# Patient Record
Sex: FEMALE | Race: WHITE | NOT HISPANIC OR LATINO
[De-identification: names, ages, dates, MRNs, and addresses within clinical notes are randomized per-mention and may not be internally consistent; named-entity substitution may affect disease eponyms.]

---

## 2017-01-26 ENCOUNTER — APPOINTMENT (OUTPATIENT)
Dept: RADIOLOGY | Facility: CLINIC | Age: 72
End: 2017-01-26

## 2017-01-26 ENCOUNTER — OUTPATIENT (OUTPATIENT)
Dept: OUTPATIENT SERVICES | Facility: HOSPITAL | Age: 72
LOS: 1 days | End: 2017-01-26
Payer: MEDICARE

## 2017-01-26 DIAGNOSIS — Z00.8 ENCOUNTER FOR OTHER GENERAL EXAMINATION: ICD-10-CM

## 2017-01-26 PROCEDURE — 77085 DXA BONE DENSITY AXL VRT FX: CPT

## 2017-04-18 ENCOUNTER — APPOINTMENT (OUTPATIENT)
Dept: ELECTROPHYSIOLOGY | Facility: CLINIC | Age: 72
End: 2017-04-18

## 2017-04-20 ENCOUNTER — APPOINTMENT (OUTPATIENT)
Dept: BREAST CENTER | Facility: CLINIC | Age: 72
End: 2017-04-20

## 2017-04-20 VITALS
HEIGHT: 64 IN | HEART RATE: 67 BPM | BODY MASS INDEX: 24.24 KG/M2 | WEIGHT: 142 LBS | DIASTOLIC BLOOD PRESSURE: 78 MMHG | SYSTOLIC BLOOD PRESSURE: 157 MMHG

## 2017-04-20 RX ORDER — APIXABAN 2.5 MG/1
2.5 TABLET, FILM COATED ORAL
Refills: 0 | Status: DISCONTINUED | COMMUNITY
End: 2017-04-20

## 2017-06-15 ENCOUNTER — APPOINTMENT (OUTPATIENT)
Dept: MAMMOGRAPHY | Facility: CLINIC | Age: 72
End: 2017-06-15

## 2017-06-15 ENCOUNTER — OUTPATIENT (OUTPATIENT)
Dept: OUTPATIENT SERVICES | Facility: HOSPITAL | Age: 72
LOS: 1 days | End: 2017-06-15
Payer: MEDICARE

## 2017-06-15 ENCOUNTER — APPOINTMENT (OUTPATIENT)
Dept: ULTRASOUND IMAGING | Facility: CLINIC | Age: 72
End: 2017-06-15

## 2017-06-15 DIAGNOSIS — Z00.8 ENCOUNTER FOR OTHER GENERAL EXAMINATION: ICD-10-CM

## 2017-06-15 PROCEDURE — 77063 BREAST TOMOSYNTHESIS BI: CPT

## 2017-06-15 PROCEDURE — 77067 SCR MAMMO BI INCL CAD: CPT

## 2017-06-15 PROCEDURE — 76641 ULTRASOUND BREAST COMPLETE: CPT

## 2017-07-17 ENCOUNTER — APPOINTMENT (OUTPATIENT)
Dept: ELECTROPHYSIOLOGY | Facility: CLINIC | Age: 72
End: 2017-07-17

## 2017-10-19 ENCOUNTER — APPOINTMENT (OUTPATIENT)
Dept: ELECTROPHYSIOLOGY | Facility: CLINIC | Age: 72
End: 2017-10-19
Payer: MEDICARE

## 2017-10-19 VITALS
WEIGHT: 145 LBS | BODY MASS INDEX: 24.75 KG/M2 | SYSTOLIC BLOOD PRESSURE: 137 MMHG | DIASTOLIC BLOOD PRESSURE: 73 MMHG | HEIGHT: 64 IN | HEART RATE: 60 BPM

## 2017-10-19 PROCEDURE — 93280 PM DEVICE PROGR EVAL DUAL: CPT

## 2018-01-22 ENCOUNTER — APPOINTMENT (OUTPATIENT)
Dept: ELECTROPHYSIOLOGY | Facility: CLINIC | Age: 73
End: 2018-01-22
Payer: MEDICARE

## 2018-01-22 PROCEDURE — 93296 REM INTERROG EVL PM/IDS: CPT

## 2018-01-22 PROCEDURE — 93294 REM INTERROG EVL PM/LDLS PM: CPT

## 2018-04-23 ENCOUNTER — APPOINTMENT (OUTPATIENT)
Dept: ELECTROPHYSIOLOGY | Facility: CLINIC | Age: 73
End: 2018-04-23
Payer: MEDICARE

## 2018-04-23 PROCEDURE — 93294 REM INTERROG EVL PM/LDLS PM: CPT

## 2018-04-23 PROCEDURE — 93296 REM INTERROG EVL PM/IDS: CPT

## 2018-04-24 ENCOUNTER — APPOINTMENT (OUTPATIENT)
Dept: BREAST CENTER | Facility: CLINIC | Age: 73
End: 2018-04-24
Payer: MEDICARE

## 2018-04-24 VITALS
SYSTOLIC BLOOD PRESSURE: 122 MMHG | DIASTOLIC BLOOD PRESSURE: 70 MMHG | WEIGHT: 148 LBS | HEART RATE: 64 BPM | BODY MASS INDEX: 25.27 KG/M2 | HEIGHT: 64 IN

## 2018-04-24 PROCEDURE — 99214 OFFICE O/P EST MOD 30 MIN: CPT

## 2018-05-13 ENCOUNTER — INPATIENT (INPATIENT)
Facility: HOSPITAL | Age: 73
LOS: 3 days | Discharge: HOME | End: 2018-05-17
Attending: SURGERY | Admitting: SURGERY
Payer: MEDICARE

## 2018-05-13 VITALS — HEIGHT: 63 IN | WEIGHT: 147.05 LBS

## 2018-05-13 DIAGNOSIS — Q45.3 OTHER CONGENITAL MALFORMATIONS OF PANCREAS AND PANCREATIC DUCT: ICD-10-CM

## 2018-05-13 DIAGNOSIS — I10 ESSENTIAL (PRIMARY) HYPERTENSION: ICD-10-CM

## 2018-05-13 DIAGNOSIS — Z95.0 PRESENCE OF CARDIAC PACEMAKER: Chronic | ICD-10-CM

## 2018-05-13 DIAGNOSIS — E78.5 HYPERLIPIDEMIA, UNSPECIFIED: ICD-10-CM

## 2018-05-13 DIAGNOSIS — I48.0 PAROXYSMAL ATRIAL FIBRILLATION: ICD-10-CM

## 2018-05-13 DIAGNOSIS — K35.80 UNSPECIFIED ACUTE APPENDICITIS: ICD-10-CM

## 2018-05-13 DIAGNOSIS — Z90.12 ACQUIRED ABSENCE OF LEFT BREAST AND NIPPLE: Chronic | ICD-10-CM

## 2018-05-13 DIAGNOSIS — Z98.890 OTHER SPECIFIED POSTPROCEDURAL STATES: Chronic | ICD-10-CM

## 2018-05-13 DIAGNOSIS — J44.9 CHRONIC OBSTRUCTIVE PULMONARY DISEASE, UNSPECIFIED: ICD-10-CM

## 2018-05-13 DIAGNOSIS — N18.2 CHRONIC KIDNEY DISEASE, STAGE 2 (MILD): ICD-10-CM

## 2018-05-13 DIAGNOSIS — K80.20 CALCULUS OF GALLBLADDER WITHOUT CHOLECYSTITIS WITHOUT OBSTRUCTION: ICD-10-CM

## 2018-05-13 DIAGNOSIS — R16.0 HEPATOMEGALY, NOT ELSEWHERE CLASSIFIED: ICD-10-CM

## 2018-05-13 LAB
ALBUMIN SERPL ELPH-MCNC: 4 G/DL — SIGNIFICANT CHANGE UP (ref 3.3–5)
ALP SERPL-CCNC: 60 U/L — SIGNIFICANT CHANGE UP (ref 40–120)
ALT FLD-CCNC: 24 U/L — SIGNIFICANT CHANGE UP (ref 12–78)
ANION GAP SERPL CALC-SCNC: 8 MMOL/L — SIGNIFICANT CHANGE UP (ref 5–17)
APPEARANCE UR: CLEAR — SIGNIFICANT CHANGE UP
APTT BLD: 36.8 SEC — SIGNIFICANT CHANGE UP (ref 27.5–37.4)
AST SERPL-CCNC: 26 U/L — SIGNIFICANT CHANGE UP (ref 15–37)
BACTERIA # UR AUTO: (no result)
BASOPHILS # BLD AUTO: 0.1 K/UL — SIGNIFICANT CHANGE UP (ref 0–0.2)
BASOPHILS NFR BLD AUTO: 0.9 % — SIGNIFICANT CHANGE UP (ref 0–2)
BILIRUB SERPL-MCNC: 0.8 MG/DL — SIGNIFICANT CHANGE UP (ref 0.2–1.2)
BILIRUB UR-MCNC: NEGATIVE — SIGNIFICANT CHANGE UP
BUN SERPL-MCNC: 15 MG/DL — SIGNIFICANT CHANGE UP (ref 7–23)
CALCIUM SERPL-MCNC: 9.3 MG/DL — SIGNIFICANT CHANGE UP (ref 8.5–10.1)
CHLORIDE SERPL-SCNC: 107 MMOL/L — SIGNIFICANT CHANGE UP (ref 96–108)
CO2 SERPL-SCNC: 23 MMOL/L — SIGNIFICANT CHANGE UP (ref 22–31)
COLOR SPEC: YELLOW — SIGNIFICANT CHANGE UP
CREAT SERPL-MCNC: 1.21 MG/DL — SIGNIFICANT CHANGE UP (ref 0.5–1.3)
DIFF PNL FLD: NEGATIVE — SIGNIFICANT CHANGE UP
EOSINOPHIL # BLD AUTO: 0.07 K/UL — SIGNIFICANT CHANGE UP (ref 0–0.5)
EOSINOPHIL NFR BLD AUTO: 0.6 % — SIGNIFICANT CHANGE UP (ref 0–6)
EPI CELLS # UR: SIGNIFICANT CHANGE UP
GLUCOSE SERPL-MCNC: 99 MG/DL — SIGNIFICANT CHANGE UP (ref 70–99)
GLUCOSE UR QL: NEGATIVE MG/DL — SIGNIFICANT CHANGE UP
HCT VFR BLD CALC: 45.9 % — HIGH (ref 34.5–45)
HGB BLD-MCNC: 15.5 G/DL — SIGNIFICANT CHANGE UP (ref 11.5–15.5)
IMM GRANULOCYTES NFR BLD AUTO: 0.3 % — SIGNIFICANT CHANGE UP (ref 0–1.5)
INR BLD: 1.32 RATIO — HIGH (ref 0.88–1.16)
KETONES UR-MCNC: NEGATIVE — SIGNIFICANT CHANGE UP
LEUKOCYTE ESTERASE UR-ACNC: (no result)
LIDOCAIN IGE QN: 104 U/L — SIGNIFICANT CHANGE UP (ref 73–393)
LYMPHOCYTES # BLD AUTO: 1.36 K/UL — SIGNIFICANT CHANGE UP (ref 1–3.3)
LYMPHOCYTES # BLD AUTO: 12.3 % — LOW (ref 13–44)
MANUAL SMEAR VERIFICATION: SIGNIFICANT CHANGE UP
MCHC RBC-ENTMCNC: 31.3 PG — SIGNIFICANT CHANGE UP (ref 27–34)
MCHC RBC-ENTMCNC: 33.8 GM/DL — SIGNIFICANT CHANGE UP (ref 32–36)
MCV RBC AUTO: 92.7 FL — SIGNIFICANT CHANGE UP (ref 80–100)
MONOCYTES # BLD AUTO: 1.52 K/UL — HIGH (ref 0–0.9)
MONOCYTES NFR BLD AUTO: 13.8 % — SIGNIFICANT CHANGE UP (ref 2–14)
NEUTROPHILS # BLD AUTO: 7.94 K/UL — HIGH (ref 1.8–7.4)
NEUTROPHILS NFR BLD AUTO: 72.1 % — SIGNIFICANT CHANGE UP (ref 43–77)
NITRITE UR-MCNC: NEGATIVE — SIGNIFICANT CHANGE UP
NRBC # BLD: 0 /100 WBCS — SIGNIFICANT CHANGE UP (ref 0–0)
PH UR: 7 — SIGNIFICANT CHANGE UP (ref 5–8)
PLAT MORPH BLD: NORMAL — SIGNIFICANT CHANGE UP
PLATELET # BLD AUTO: 281 K/UL — SIGNIFICANT CHANGE UP (ref 150–400)
POTASSIUM SERPL-MCNC: 4.5 MMOL/L — SIGNIFICANT CHANGE UP (ref 3.5–5.3)
POTASSIUM SERPL-SCNC: 4.5 MMOL/L — SIGNIFICANT CHANGE UP (ref 3.5–5.3)
PROT SERPL-MCNC: 7.7 GM/DL — SIGNIFICANT CHANGE UP (ref 6–8.3)
PROT UR-MCNC: NEGATIVE MG/DL — SIGNIFICANT CHANGE UP
PROTHROM AB SERPL-ACNC: 14.3 SEC — HIGH (ref 9.8–12.7)
RBC # BLD: 4.95 M/UL — SIGNIFICANT CHANGE UP (ref 3.8–5.2)
RBC # FLD: 13.3 % — SIGNIFICANT CHANGE UP (ref 10.3–14.5)
RBC BLD AUTO: NORMAL — SIGNIFICANT CHANGE UP
RBC CASTS # UR COMP ASSIST: SIGNIFICANT CHANGE UP /HPF (ref 0–4)
SODIUM SERPL-SCNC: 138 MMOL/L — SIGNIFICANT CHANGE UP (ref 135–145)
SP GR SPEC: 1 — LOW (ref 1.01–1.02)
UROBILINOGEN FLD QL: NEGATIVE MG/DL — SIGNIFICANT CHANGE UP
WBC # BLD: 11.02 K/UL — HIGH (ref 3.8–10.5)
WBC # FLD AUTO: 11.02 K/UL — HIGH (ref 3.8–10.5)
WBC UR QL: SIGNIFICANT CHANGE UP

## 2018-05-13 PROCEDURE — 93010 ELECTROCARDIOGRAM REPORT: CPT

## 2018-05-13 PROCEDURE — 71045 X-RAY EXAM CHEST 1 VIEW: CPT | Mod: 26

## 2018-05-13 PROCEDURE — 74177 CT ABD & PELVIS W/CONTRAST: CPT | Mod: 26

## 2018-05-13 PROCEDURE — 99223 1ST HOSP IP/OBS HIGH 75: CPT | Mod: 57

## 2018-05-13 PROCEDURE — 99285 EMERGENCY DEPT VISIT HI MDM: CPT

## 2018-05-13 RX ORDER — TIOTROPIUM BROMIDE 18 UG/1
1 CAPSULE ORAL; RESPIRATORY (INHALATION) DAILY
Qty: 0 | Refills: 0 | Status: DISCONTINUED | OUTPATIENT
Start: 2018-05-13 | End: 2018-05-14

## 2018-05-13 RX ORDER — SODIUM CHLORIDE 9 MG/ML
1000 INJECTION INTRAMUSCULAR; INTRAVENOUS; SUBCUTANEOUS ONCE
Qty: 0 | Refills: 0 | Status: COMPLETED | OUTPATIENT
Start: 2018-05-13 | End: 2018-05-13

## 2018-05-13 RX ORDER — METOPROLOL TARTRATE 50 MG
0 TABLET ORAL
Qty: 0 | Refills: 0 | COMMUNITY

## 2018-05-13 RX ORDER — PIPERACILLIN AND TAZOBACTAM 4; .5 G/20ML; G/20ML
3.38 INJECTION, POWDER, LYOPHILIZED, FOR SOLUTION INTRAVENOUS ONCE
Qty: 0 | Refills: 0 | Status: COMPLETED | OUTPATIENT
Start: 2018-05-13 | End: 2018-05-13

## 2018-05-13 RX ORDER — HYDROMORPHONE HYDROCHLORIDE 2 MG/ML
0.5 INJECTION INTRAMUSCULAR; INTRAVENOUS; SUBCUTANEOUS EVERY 4 HOURS
Qty: 0 | Refills: 0 | Status: DISCONTINUED | OUTPATIENT
Start: 2018-05-13 | End: 2018-05-14

## 2018-05-13 RX ORDER — SODIUM CHLORIDE 9 MG/ML
1000 INJECTION INTRAMUSCULAR; INTRAVENOUS; SUBCUTANEOUS
Qty: 0 | Refills: 0 | Status: DISCONTINUED | OUTPATIENT
Start: 2018-05-13 | End: 2018-05-14

## 2018-05-13 RX ORDER — PIPERACILLIN AND TAZOBACTAM 4; .5 G/20ML; G/20ML
3.38 INJECTION, POWDER, LYOPHILIZED, FOR SOLUTION INTRAVENOUS EVERY 8 HOURS
Qty: 0 | Refills: 0 | Status: DISCONTINUED | OUTPATIENT
Start: 2018-05-13 | End: 2018-05-14

## 2018-05-13 RX ORDER — HYDROMORPHONE HYDROCHLORIDE 2 MG/ML
1 INJECTION INTRAMUSCULAR; INTRAVENOUS; SUBCUTANEOUS EVERY 4 HOURS
Qty: 0 | Refills: 0 | Status: DISCONTINUED | OUTPATIENT
Start: 2018-05-13 | End: 2018-05-14

## 2018-05-13 RX ORDER — TIOTROPIUM BROMIDE 18 UG/1
2.5 CAPSULE ORAL; RESPIRATORY (INHALATION) DAILY
Qty: 0 | Refills: 0 | Status: DISCONTINUED | OUTPATIENT
Start: 2018-05-13 | End: 2018-05-13

## 2018-05-13 RX ORDER — SIMVASTATIN 20 MG/1
1 TABLET, FILM COATED ORAL
Qty: 0 | Refills: 0 | COMMUNITY

## 2018-05-13 RX ORDER — LISINOPRIL 2.5 MG/1
20 TABLET ORAL DAILY
Qty: 0 | Refills: 0 | Status: DISCONTINUED | OUTPATIENT
Start: 2018-05-13 | End: 2018-05-13

## 2018-05-13 RX ORDER — FAMOTIDINE 10 MG/ML
20 INJECTION INTRAVENOUS ONCE
Qty: 0 | Refills: 0 | Status: COMPLETED | OUTPATIENT
Start: 2018-05-13 | End: 2018-05-13

## 2018-05-13 RX ORDER — DILTIAZEM HCL 120 MG
300 CAPSULE, EXT RELEASE 24 HR ORAL DAILY
Qty: 0 | Refills: 0 | Status: DISCONTINUED | OUTPATIENT
Start: 2018-05-13 | End: 2018-05-14

## 2018-05-13 RX ORDER — TIOTROPIUM BROMIDE 18 UG/1
0 CAPSULE ORAL; RESPIRATORY (INHALATION)
Qty: 0 | Refills: 0 | COMMUNITY

## 2018-05-13 RX ORDER — METOPROLOL TARTRATE 50 MG
25 TABLET ORAL
Qty: 0 | Refills: 0 | Status: DISCONTINUED | OUTPATIENT
Start: 2018-05-13 | End: 2018-05-14

## 2018-05-13 RX ORDER — SIMVASTATIN 20 MG/1
10 TABLET, FILM COATED ORAL AT BEDTIME
Qty: 0 | Refills: 0 | Status: DISCONTINUED | OUTPATIENT
Start: 2018-05-13 | End: 2018-05-14

## 2018-05-13 RX ORDER — ACETAMINOPHEN 500 MG
650 TABLET ORAL EVERY 6 HOURS
Qty: 0 | Refills: 0 | Status: DISCONTINUED | OUTPATIENT
Start: 2018-05-13 | End: 2018-05-14

## 2018-05-13 RX ORDER — SODIUM CHLORIDE 9 MG/ML
3 INJECTION INTRAMUSCULAR; INTRAVENOUS; SUBCUTANEOUS ONCE
Qty: 0 | Refills: 0 | Status: COMPLETED | OUTPATIENT
Start: 2018-05-13 | End: 2018-05-13

## 2018-05-13 RX ORDER — LISINOPRIL 2.5 MG/1
20 TABLET ORAL
Qty: 0 | Refills: 0 | Status: DISCONTINUED | OUTPATIENT
Start: 2018-05-13 | End: 2018-05-14

## 2018-05-13 RX ORDER — ALBUTEROL 90 UG/1
2 AEROSOL, METERED ORAL EVERY 6 HOURS
Qty: 0 | Refills: 0 | Status: DISCONTINUED | OUTPATIENT
Start: 2018-05-13 | End: 2018-05-14

## 2018-05-13 RX ORDER — HEPARIN SODIUM 5000 [USP'U]/ML
5000 INJECTION INTRAVENOUS; SUBCUTANEOUS EVERY 12 HOURS
Qty: 0 | Refills: 0 | Status: DISCONTINUED | OUTPATIENT
Start: 2018-05-13 | End: 2018-05-14

## 2018-05-13 RX ORDER — PANTOPRAZOLE SODIUM 20 MG/1
40 TABLET, DELAYED RELEASE ORAL DAILY
Qty: 0 | Refills: 0 | Status: DISCONTINUED | OUTPATIENT
Start: 2018-05-13 | End: 2018-05-14

## 2018-05-13 RX ORDER — ONDANSETRON 8 MG/1
4 TABLET, FILM COATED ORAL EVERY 6 HOURS
Qty: 0 | Refills: 0 | Status: DISCONTINUED | OUTPATIENT
Start: 2018-05-13 | End: 2018-05-14

## 2018-05-13 RX ORDER — ALBUTEROL 90 UG/1
2 AEROSOL, METERED ORAL ONCE
Qty: 0 | Refills: 0 | Status: DISCONTINUED | OUTPATIENT
Start: 2018-05-13 | End: 2018-05-14

## 2018-05-13 RX ADMIN — SIMVASTATIN 10 MILLIGRAM(S): 20 TABLET, FILM COATED ORAL at 22:55

## 2018-05-13 RX ADMIN — SODIUM CHLORIDE 100 MILLILITER(S): 9 INJECTION INTRAMUSCULAR; INTRAVENOUS; SUBCUTANEOUS at 20:44

## 2018-05-13 RX ADMIN — LISINOPRIL 20 MILLIGRAM(S): 2.5 TABLET ORAL at 22:55

## 2018-05-13 RX ADMIN — SODIUM CHLORIDE 1000 MILLILITER(S): 9 INJECTION INTRAMUSCULAR; INTRAVENOUS; SUBCUTANEOUS at 13:00

## 2018-05-13 RX ADMIN — Medication 650 MILLIGRAM(S): at 23:00

## 2018-05-13 RX ADMIN — PIPERACILLIN AND TAZOBACTAM 200 GRAM(S): 4; .5 INJECTION, POWDER, LYOPHILIZED, FOR SOLUTION INTRAVENOUS at 17:09

## 2018-05-13 RX ADMIN — PIPERACILLIN AND TAZOBACTAM 25 GRAM(S): 4; .5 INJECTION, POWDER, LYOPHILIZED, FOR SOLUTION INTRAVENOUS at 22:56

## 2018-05-13 RX ADMIN — FAMOTIDINE 20 MILLIGRAM(S): 10 INJECTION INTRAVENOUS at 13:10

## 2018-05-13 NOTE — ED PROVIDER NOTE - CONSTITUTIONAL, MLM
normal... white female adult, non toxic, alert, oriented to person, place, time/situation and in no apparent distress.

## 2018-05-13 NOTE — H&P ADULT - PMH
Afib    Chronic obstructive pulmonary disease, unspecified COPD type    Hyperlipidemia, unspecified hyperlipidemia type    Hypertension, unspecified type    Malignant neoplasm of left female breast, unspecified estrogen receptor status, unspecified site of breast    Pacemaker

## 2018-05-13 NOTE — CONSULT NOTE ADULT - PROBLEM SELECTOR RECOMMENDATION 2
Hold Eliquis. History of PAF with less than 6-7% burden on pacemaker interrogation. Anticoagulation service to advise. As per discussion with patient and surgeon will hold bridging with Lovenox as took last dose after 7:30 PM. No previous history of TIA/CVA. CHADS2 1. Patient will be on subcu heparin for DVT prophylaxis. Hold Eliquis. History of PAF with less than 6-7% burden on pacemaker interrogation. Anticoagulation service to advise. As per discussion with patient and surgeon will hold bridging with Lovenox as took last dose after 7:30 PM. No previous history of TIA/CVA. CHADS2 1-low risk. Patient will be on subcu heparin for DVT prophylaxis.

## 2018-05-13 NOTE — ED ADULT TRIAGE NOTE - CHIEF COMPLAINT QUOTE
pt states RUQ pain w/ radiating pain to the right mid axillary region for the past 3 days not associated with n/v/d, hematuria or dysuria. pt states pain is exacerbated by movement. denies fever or chills.

## 2018-05-13 NOTE — ED PROVIDER NOTE - OBJECTIVE STATEMENT
72 y/o F with a PMHx of torturous colon, pacemaker, Afib on Eliquis, BA Cancer in remission, mastectomy, Empyema presents to the ED c/o +belching, RUQ and RLQ quadrant abd pain since Friday evening, +abd distended/ abd pain in the RUQ then gradually diffuse abd, by Saturday localized into RUQ pain described as sharp moderate intermittent, intermittently radiating to waist line. Aggravating factors such as movement, breathing, turning side to side, coughing. Pt notes appetite is normal. Denies N/V. Pt notes soft stools Saturday. Pt states belching does not make the pain go away. Denies trauma or known injury. PMD: Dr.Grace Abrams [facility] . Allergic to Mobic.

## 2018-05-13 NOTE — CONSULT NOTE ADULT - PROBLEM SELECTOR RECOMMENDATION 8
To follow with GI outpatient- Dr. Gómez. A copy of CT scan given to patient. Patient known about fatty infiltration of liver. To follow with GI outpatient- Dr. Gómez. A copy of CT scan given to patient.

## 2018-05-13 NOTE — ED PROVIDER NOTE - PROGRESS NOTE DETAILS
Pt declines pain medication at current time. Dr. Pascal:  OK for CT A/P to be done w/ IV contrast. Dr. Pascal:  Case signed out to Dr. Rodriguez:  [] CT A/P,   [] po trial if neg. & reassess,  [] dispo. Rec s/o pending imaging - CT A/P showed GB distention, rec US to evaluate further  RUQ US showed GB thickening and sludge, no pericholecystic fluid or stones. +sonographic [place] sign. Gen surg sonsult called with ROCK Tian scan ordered, will f/u recs Rec s/o pending CT abd - shows appendicitis. Surg cs obtaioned wioth Dr Baptiste - rec anthony and admit

## 2018-05-13 NOTE — CONSULT NOTE ADULT - PROBLEM SELECTOR RECOMMENDATION 9
NPO. IVF and IV Zosyn. Patient will be taken to OR tomorrow due to being on Eliquis and patient also agreed.  RCRI=0. No gross contraindication medically once beyond 48 hours from last dose of Eliquis. To follow with GI outpatient- Dr. Gómez. A copy of CT scan given to patient. Avoid fatty foods. NPO. IVF and IV Zosyn. Patient will be taken to OR tomorrow due to being on Eliquis and patient also agreed.  RCRI=0. No gross contraindication medically once beyond recommended wait after last dose of Eliquis.

## 2018-05-13 NOTE — CONSULT NOTE ADULT - ASSESSMENT
74 y/o F with a PMHx of torturous colon, s/p pacemaker (interrogated April 2018), PAF on Eliquis, HTN- Echo normal LVEF and negative nuclear stress test 2017 (as per patient), Left Breast Cancer s/p mastectomy and adjuvant chemo in remission,, Hyperlipidemia, Arthritis especially knees and Empyema presents to the ED c/o +belching, RUQ and RLQ quadrant abd pain since Friday evening, +abd distended/ abd pain in the RUQ then gradually diffuse abd, by Saturday localized into RUQ pain described as sharp moderate intermittent, intermittently radiating to waist line. Being admitted for acute appendicitis for possible OR tomorrow due to being Eliquis and last dose less than 24 hours ago.

## 2018-05-13 NOTE — CONSULT NOTE ADULT - ATTENDING COMMENTS
Diet: NPO except medications.     RADIOLOGY & ADDITIONAL TESTS:    Imaging Personally Reviewed:  [ x] YES  [ ] NO    Consultant(s) Notes Reviewed:  [x ] YES  [ ] NO      DVT Prophylaxis:  Subcu Heparin [x  ]     LMWH [ ]     Coumadin [ ]    Xaeralto [ ]    Eliquis [ ]   Venodyne pumps [ ]    Discussed with Patient [ x]     Family [ ]          [ ]   RN[x ]      [ ]    Advance Directives: Full code.     Care Discussed with Consultants/Other Providers [x ] YES  [ ] NO   Dr. Bedoya, RN and pharmacist.    Care plan was discussed with patient in detail and questions answered. In agreement.

## 2018-05-13 NOTE — H&P ADULT - ASSESSMENT
A/P:  Acute appendicitis  Coagulopathy on eliquis anticoagulation   Medical comorbidities of HTN, HLD, copd, atrial fibrillation, h/o breast cancer  NPO, IV hydration  IV antibiotics  GI/DVT prophylaxis  Pain control  Hospitalist consult for medical management  Cardiology consult  Pulmonary consult  Incentive spirometry  Serial abd exams  F/U labs    Pt offered surgical intervention of laparascopic possible open appendectomy. Pt explained the surgical procedures in both medical terminology and in lay terms that the patient understood. Pt explained in both medical terminology and in lay terms that the patient understood, the benefits and alternatives of surgery including non operative management. Pt explained at length in both medical terminology and in lay terms that the patient understood, the associated risks of surgery, including HIGH risk of dayan and post operative bleeding associated with eliquis anticoagulation use. Pt understood all of the above. All questions were answered. Pt DOES NOT want to have surgery at this time, pt wants to await till tomorrow 5/14/18 to have surgery , to minimize the risk of bleeding during surgical intervention. Pt explained in both medical terminology and in lay terms that the patient understood, the associated risks of the decision to not have surgery including worsening pathology, risk of perforation, risk of sepsis, risk of septic shock. Pt understood all of the above, all questions were answered.    Plan for surgical intervention of appendectomy 5/14/18  Pt aware of and agrees with all of the above

## 2018-05-13 NOTE — ED ADULT NURSE NOTE - OBJECTIVE STATEMENT
C/O abdominal pain. Patient reports she was "bloated on Friday" when pain started, then pain moved to RUQ. Denies N/V/D, fever chills, abdominal surgeries. Reports hx of Afib, pacemaker.

## 2018-05-13 NOTE — ED PROVIDER NOTE - ENMT, MLM
oropharynx clear, mucus membranes mildly dry. Airway patent, Nasal mucosa clear. Mouth with normal mucosa. Throat has no vesicles, no oropharyngeal exudates and uvula is midline.

## 2018-05-13 NOTE — H&P ADULT - HISTORY OF PRESENT ILLNESS
Pt seen and examined at bedside with chaperone. Pt is AAOx3, pt in no acute distress. Pt has c/o progressively worsening right lower quadrant pain since 5/11/18. Pt denied c/o fever, chills, chest pain, SOB, N/V/D, extremity pain or dysfunction, hemoptysis, hematemesis, hematuria, hematochexia, headache, diplopia, vertigo, dizzyness. Pt tolerating diet, (+) void, (+) ambulation, (+) bowel function    Pt on eliquis anticoagulation, last dose was evening 5/12/18

## 2018-05-13 NOTE — ED PROVIDER NOTE - MEDICAL DECISION MAKING DETAILS
Elderly female ambulatory with 2 days light abd pain, +tenderness to RU, RLQ on exam, denies N/V/D, +abd distended plan CXR, labs, urine, IV fluids, CT ABD/Pelvis.

## 2018-05-13 NOTE — H&P ADULT - NSHPLABSRESULTS_GEN_ALL_CORE
Vital Signs Last 24 Hrs  T(C): 37.2 (13 May 2018 15:49), Max: 37.2 (13 May 2018 15:49)  T(F): 99 (13 May 2018 15:49), Max: 99 (13 May 2018 15:49)  HR: 62 (13 May 2018 15:49) (62 - 66)  BP: 128/51 (13 May 2018 15:49) (112/66 - 128/51)  BP(mean): --  RR: 16 (13 May 2018 15:49) (16 - 18)  SpO2: 95% (13 May 2018 15:49) (95% - 97%)    Labs:                        15.5   11.02 )-----------( 281      ( 13 May 2018 12:22 )             45.9     CBC Full  -  ( 13 May 2018 12:22 )  WBC Count : 11.02 K/uL  Hemoglobin : 15.5 g/dL  Hematocrit : 45.9 %  Platelet Count - Automated : 281 K/uL  Mean Cell Volume : 92.7 fl  Mean Cell Hemoglobin : 31.3 pg  Mean Cell Hemoglobin Concentration : 33.8 gm/dL  Auto Neutrophil # : 7.94 K/uL  Auto Lymphocyte # : 1.36 K/uL  Auto Monocyte # : 1.52 K/uL  Auto Eosinophil # : 0.07 K/uL  Auto Basophil # : 0.10 K/uL  Auto Neutrophil % : 72.1 %  Auto Lymphocyte % : 12.3 %  Auto Monocyte % : 13.8 %  Auto Eosinophil % : 0.6 %  Auto Basophil % : 0.9 %    05-13    138  |  107  |  15  ----------------------------<  99  4.5   |  23  |  1.21    Ca    9.3      13 May 2018 12:22    TPro  7.7  /  Alb  4.0  /  TBili  0.8  /  DBili  x   /  AST  26  /  ALT  24  /  AlkPhos  60  05-13    LIVER FUNCTIONS - ( 13 May 2018 12:22 )  Alb: 4.0 g/dL / Pro: 7.7 gm/dL / ALK PHOS: 60 U/L / ALT: 24 U/L / AST: 26 U/L / GGT: x           PT/INR - ( 13 May 2018 13:10 )   PT: 14.3 sec;   INR: 1.32 ratio         PTT - ( 13 May 2018 13:10 )  PTT:36.8 sec    Radiology:    EXAM:  CT ABDOMEN AND PELVIS OC IC                          PROCEDURE DATE:  05/13/2018      INTERPRETATION:  Clinical history: 73 year old female, right abdominal   pain, tender, distention rule out appendicitis, cholecystitis.    Impression    Prominence of the appendix with surrounding soft tissue infiltration most   consistent with appendicitis in the correct clinical context. No   appendicolith, collection or evidence of perforation.    Solitary subcentimeter gallbladder calculus with no other acute findings    Slight prominence of the pancreatic head with areas of decreased   attenuation most consistent with fatty infiltration, no gross mass.    Hepatomegaly with diffuse fatty infiltration, no focal mass or   intrahepatic biliary ductal dilatation.    EXAM:  XR CHEST AP OR PA 1V                          PROCEDURE DATE:  05/13/2018      INTERPRETATION:  CLINICAL STATEMENT: Chest Pain.    TECHNIQUE: AP view of the chest.    COMPARISON: None available.    FINDINGS/  IMPRESSION:  Left cardiac device and surgical clips.    Atelectasis lung bases. No consolidation or pleural effusion.    Heart size cannot be accurately assessed in this projection.

## 2018-05-13 NOTE — CONSULT NOTE ADULT - SUBJECTIVE AND OBJECTIVE BOX
PCP:  Dr. Sandra Cummins                                       Consultant: Dr. Gutierrez-cardiologist, Dr. Justin-pulmonary, Dr. Stoddard-EP    CC: Patient is a 73y old  Female who presents with a chief complaint of abd pain since 18 (13 May 2018 18:06)    HPI: 72 y/o F with a PMHx of torturous colon, s/p pacemaker (interrogated 2018), PAF on Eliquis, HTN- Echo normal LVEF and negative nuclear stress test  (as per patient), Left Breast Cancer s/p mastectomy and adjuvant chemo in remission,, Hyperlipidemia, Arthritis especially knees and Empyema presents to the ED c/o +belching, RUQ and RLQ quadrant abd pain since Friday evening, +abd distended/ abd pain in the RUQ then gradually diffuse abd, by Saturday localized into RUQ pain described as sharp moderate intermittent, intermittently radiating to waist line. Aggravating factors such as movement, breathing, turning side to side, coughing. Pt notes appetite is normal. Denies N/V. Pt notes soft stools Saturday. Pt states belching does not make the pain go away. Denies trauma or known injury. Pt has c/o progressively worsening right lower quadrant pain since 18. Pt denied c/o fever, chills, chest pain, SOB, N/V/D, extremity pain or dysfunction, hemoptysis, hematemesis, hematuria, hematochezia headache, diplopia, vertigo, dizziness Pt tolerating diet, (+) void, (+) ambulation, (+) bowel function. Pt on Eliquis anticoagulation, last dose was evening 18 at 7:30 PM.       REVIEW OF SYSTEMS: Rest of the review of systems is negative except what mentioned in HPI. Climb a flight of chair and just finished pulmonary rehab.      Allergies and Intolerances:        Allergies:  	No Known Allergies:        Intolerances:  	Mobic: Drug, Other, tachycardia    Home Medications:   * Incomplete Medication History as of 13-May-2018 10:10 documented in Structured Notes  · 	Toprol-XL 25 mg oral tablet, extended release: orally 2 times a day, Last Dose Taken:    · 	Cardizem 60 mg oral tablet: orally once a day, Last Dose Taken:    · 	Cardizem SR: 300 milligram(s) orally once a day, Last Dose Taken:    · 	Eliquis 5 mg oral tablet: 1 tab(s) orally 2 times a day, Last Dose Taken:    · 	simvastatin 10 mg oral tablet: 1 tab(s) orally once a day (at bedtime), Last Dose Taken:    · 	benazepril 20 mg oral tablet: 1 tab(s) orally once a day, Last Dose Taken:    · 	Spiriva: Last Dose Taken:    · 	fenofibrate: Last Dose Taken:      .    Patient History:    Past Medical History:  Afib    Chronic obstructive pulmonary disease, unspecified COPD type    Hyperlipidemia, unspecified hyperlipidemia type    Hypertension, unspecified type    Malignant neoplasm of left female breast, unspecified estrogen receptor status, unspecified site of breast    Pacemaker.     Past Surgical History:  Artificial pacemaker    H/O mastectomy, left    S/P right knee arthroscopy.     Family History:  Father  of pancreatic cancer.     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 Ex-smoker. Quit 20+ years ago. Smoked for 1 PPD for 10-15 years.  No alcohol or drug abuse.    PHYSICAL EXAM:  T(C): 37.2 (18 @ 15:49), Max: 37.2 (18 @ 15:49)  HR: 62 (-18 @ 15:49) (62 - 66)  BP: 128/51 (-18 @ 15:49) (112/66 - 128/51)  RR: 16 (18 @ 15:49) (16 - 18)  SpO2: 95% (18 @ 15:49) (95% - 97%)  Wt(kg): --  I&O's Summary    GENERAL: NAD, well-groomed, well-developed  HEAD:  Atraumatic, Normocephalic  EYES: EOMI, PERRLA, conjunctiva and sclera clear  ENMT: No tonsillar erythema, exudates, or enlargement; Moist mucous membranes.  No lesions.  NECK: Supple, No JVD, Normal thyroid  NERVOUS SYSTEM:  Alert & Oriented X3, Good concentration; Motor Strength 5/5 B/L upper and lower extremities.  CHEST/LUNG: Clear to percussion bilaterally; No rales, rhonchi, wheezing, or rubs  HEART: Regular rate and rhythm; No murmurs, rubs, or gallops  ABDOMEN: Soft but distended with right lower quadrant tenderness without rigidity or rebound tenderness.  EXTREMITIES:  1+ Peripheral Pulses, No clubbing, cyanosis, or edema  LYMPH: No lymphadenopathy noted  SKIN: No rashes or lesions    LABS:                        15.5   11.02 )-----------( 281      ( 13 May 2018 12:22 )             45.9         138  |  107  |  15  ----------------------------<  99  4.5   |  23  |  1.21    Ca    9.3      13 May 2018 12:22    TPro  7.7  /  Alb  4.0  /  TBili  0.8  /  DBili  x   /  AST  26  /  ALT  24  /  AlkPhos  60  -    PT/INR - ( 13 May 2018 13:10 )   PT: 14.3 sec;   INR: 1.32 ratio         PTT - ( 13 May 2018 13:10 )  PTT:36.8 sec  Urinalysis Basic - ( 13 May 2018 13:05 )    Color: Yellow / Appearance: Clear / S.005 / pH: x  Gluc: x / Ketone: Negative  / Bili: Negative / Urobili: Negative mg/dL   Blood: x / Protein: Negative mg/dL / Nitrite: Negative   Leuk Esterase: Trace / RBC: 0-2 /HPF / WBC 3-5   Sq Epi: x / Non Sq Epi: Occasional / Bacteria: Few      CAPILLARY BLOOD GLUCOSE    EKG Paced @ 61.    Chest x-ray: Left cardiac device and surgical clips.    Atelectasis lung bases. No consolidation or pleural effusion.    Heart size cannot be accurately assessed in this projection.    CT abdomen and pelvis:   Prominence of the appendix with surrounding soft tissue infiltration most   consistent with appendicitis in the correct clinical context. No   appendicolith, collection or evidence of perforation.    Solitary subcentimeter gallbladder calculus with no other acute findings    Slight prominence of the pancreatic head with areas of decreased   attenuation most consistent with fatty infiltration, no gross mass.    Hepatomegaly with diffuse fatty infiltration, no focal mass or   intrahepatic biliary ductal dilatation.

## 2018-05-14 ENCOUNTER — RESULT REVIEW (OUTPATIENT)
Age: 73
End: 2018-05-14

## 2018-05-14 LAB
ANION GAP SERPL CALC-SCNC: 9 MMOL/L — SIGNIFICANT CHANGE UP (ref 5–17)
BUN SERPL-MCNC: 10 MG/DL — SIGNIFICANT CHANGE UP (ref 7–23)
CALCIUM SERPL-MCNC: 8.7 MG/DL — SIGNIFICANT CHANGE UP (ref 8.5–10.1)
CHLORIDE SERPL-SCNC: 112 MMOL/L — HIGH (ref 96–108)
CO2 SERPL-SCNC: 22 MMOL/L — SIGNIFICANT CHANGE UP (ref 22–31)
CREAT SERPL-MCNC: 1 MG/DL — SIGNIFICANT CHANGE UP (ref 0.5–1.3)
GLUCOSE SERPL-MCNC: 109 MG/DL — HIGH (ref 70–99)
HCT VFR BLD CALC: 43.9 % — SIGNIFICANT CHANGE UP (ref 34.5–45)
HGB BLD-MCNC: 14.6 G/DL — SIGNIFICANT CHANGE UP (ref 11.5–15.5)
MCHC RBC-ENTMCNC: 30.9 PG — SIGNIFICANT CHANGE UP (ref 27–34)
MCHC RBC-ENTMCNC: 33.3 GM/DL — SIGNIFICANT CHANGE UP (ref 32–36)
MCV RBC AUTO: 92.8 FL — SIGNIFICANT CHANGE UP (ref 80–100)
NRBC # BLD: 0 /100 WBCS — SIGNIFICANT CHANGE UP (ref 0–0)
PLATELET # BLD AUTO: 290 K/UL — SIGNIFICANT CHANGE UP (ref 150–400)
POTASSIUM SERPL-MCNC: 3.8 MMOL/L — SIGNIFICANT CHANGE UP (ref 3.5–5.3)
POTASSIUM SERPL-SCNC: 3.8 MMOL/L — SIGNIFICANT CHANGE UP (ref 3.5–5.3)
RBC # BLD: 4.73 M/UL — SIGNIFICANT CHANGE UP (ref 3.8–5.2)
RBC # FLD: 13.4 % — SIGNIFICANT CHANGE UP (ref 10.3–14.5)
SODIUM SERPL-SCNC: 143 MMOL/L — SIGNIFICANT CHANGE UP (ref 135–145)
WBC # BLD: 8.74 K/UL — SIGNIFICANT CHANGE UP (ref 3.8–10.5)
WBC # FLD AUTO: 8.74 K/UL — SIGNIFICANT CHANGE UP (ref 3.8–10.5)

## 2018-05-14 PROCEDURE — 88304 TISSUE EXAM BY PATHOLOGIST: CPT | Mod: 26

## 2018-05-14 PROCEDURE — 93280 PM DEVICE PROGR EVAL DUAL: CPT | Mod: 26

## 2018-05-14 PROCEDURE — 44970 LAPAROSCOPY APPENDECTOMY: CPT

## 2018-05-14 PROCEDURE — 99223 1ST HOSP IP/OBS HIGH 75: CPT

## 2018-05-14 RX ORDER — OXYCODONE HYDROCHLORIDE 5 MG/1
5 TABLET ORAL EVERY 4 HOURS
Qty: 0 | Refills: 0 | Status: DISCONTINUED | OUTPATIENT
Start: 2018-05-14 | End: 2018-05-14

## 2018-05-14 RX ORDER — ALBUTEROL 90 UG/1
2 AEROSOL, METERED ORAL ONCE
Qty: 0 | Refills: 0 | Status: COMPLETED | OUTPATIENT
Start: 2018-05-14 | End: 2018-05-14

## 2018-05-14 RX ORDER — LISINOPRIL 2.5 MG/1
20 TABLET ORAL
Qty: 0 | Refills: 0 | Status: DISCONTINUED | OUTPATIENT
Start: 2018-05-14 | End: 2018-05-17

## 2018-05-14 RX ORDER — PIPERACILLIN AND TAZOBACTAM 4; .5 G/20ML; G/20ML
3.38 INJECTION, POWDER, LYOPHILIZED, FOR SOLUTION INTRAVENOUS EVERY 8 HOURS
Qty: 0 | Refills: 0 | Status: DISCONTINUED | OUTPATIENT
Start: 2018-05-14 | End: 2018-05-17

## 2018-05-14 RX ORDER — ONDANSETRON 8 MG/1
4 TABLET, FILM COATED ORAL ONCE
Qty: 0 | Refills: 0 | Status: DISCONTINUED | OUTPATIENT
Start: 2018-05-14 | End: 2018-05-14

## 2018-05-14 RX ORDER — SODIUM CHLORIDE 9 MG/ML
1000 INJECTION INTRAMUSCULAR; INTRAVENOUS; SUBCUTANEOUS
Qty: 0 | Refills: 0 | Status: DISCONTINUED | OUTPATIENT
Start: 2018-05-14 | End: 2018-05-14

## 2018-05-14 RX ORDER — METOPROLOL TARTRATE 50 MG
25 TABLET ORAL
Qty: 0 | Refills: 0 | Status: DISCONTINUED | OUTPATIENT
Start: 2018-05-14 | End: 2018-05-17

## 2018-05-14 RX ORDER — HEPARIN SODIUM 5000 [USP'U]/ML
5000 INJECTION INTRAVENOUS; SUBCUTANEOUS EVERY 12 HOURS
Qty: 0 | Refills: 0 | Status: DISCONTINUED | OUTPATIENT
Start: 2018-05-15 | End: 2018-05-15

## 2018-05-14 RX ORDER — HYDROMORPHONE HYDROCHLORIDE 2 MG/ML
1 INJECTION INTRAMUSCULAR; INTRAVENOUS; SUBCUTANEOUS EVERY 4 HOURS
Qty: 0 | Refills: 0 | Status: DISCONTINUED | OUTPATIENT
Start: 2018-05-14 | End: 2018-05-17

## 2018-05-14 RX ORDER — PANTOPRAZOLE SODIUM 20 MG/1
40 TABLET, DELAYED RELEASE ORAL DAILY
Qty: 0 | Refills: 0 | Status: DISCONTINUED | OUTPATIENT
Start: 2018-05-14 | End: 2018-05-17

## 2018-05-14 RX ORDER — DILTIAZEM HCL 120 MG
300 CAPSULE, EXT RELEASE 24 HR ORAL DAILY
Qty: 0 | Refills: 0 | Status: DISCONTINUED | OUTPATIENT
Start: 2018-05-14 | End: 2018-05-14

## 2018-05-14 RX ORDER — HYDROMORPHONE HYDROCHLORIDE 2 MG/ML
0.5 INJECTION INTRAMUSCULAR; INTRAVENOUS; SUBCUTANEOUS
Qty: 0 | Refills: 0 | Status: DISCONTINUED | OUTPATIENT
Start: 2018-05-14 | End: 2018-05-14

## 2018-05-14 RX ORDER — TIOTROPIUM BROMIDE 18 UG/1
1 CAPSULE ORAL; RESPIRATORY (INHALATION) DAILY
Qty: 0 | Refills: 0 | Status: DISCONTINUED | OUTPATIENT
Start: 2018-05-14 | End: 2018-05-17

## 2018-05-14 RX ORDER — ACETAMINOPHEN 500 MG
1000 TABLET ORAL ONCE
Qty: 0 | Refills: 0 | Status: COMPLETED | OUTPATIENT
Start: 2018-05-14 | End: 2018-05-14

## 2018-05-14 RX ORDER — ACETAMINOPHEN 500 MG
650 TABLET ORAL EVERY 6 HOURS
Qty: 0 | Refills: 0 | Status: DISCONTINUED | OUTPATIENT
Start: 2018-05-14 | End: 2018-05-17

## 2018-05-14 RX ORDER — ONDANSETRON 8 MG/1
4 TABLET, FILM COATED ORAL EVERY 6 HOURS
Qty: 0 | Refills: 0 | Status: DISCONTINUED | OUTPATIENT
Start: 2018-05-14 | End: 2018-05-17

## 2018-05-14 RX ORDER — SIMVASTATIN 20 MG/1
10 TABLET, FILM COATED ORAL AT BEDTIME
Qty: 0 | Refills: 0 | Status: DISCONTINUED | OUTPATIENT
Start: 2018-05-14 | End: 2018-05-17

## 2018-05-14 RX ORDER — SODIUM CHLORIDE 9 MG/ML
1000 INJECTION INTRAMUSCULAR; INTRAVENOUS; SUBCUTANEOUS
Qty: 0 | Refills: 0 | Status: DISCONTINUED | OUTPATIENT
Start: 2018-05-14 | End: 2018-05-16

## 2018-05-14 RX ADMIN — HYDROMORPHONE HYDROCHLORIDE 0.5 MILLIGRAM(S): 2 INJECTION INTRAMUSCULAR; INTRAVENOUS; SUBCUTANEOUS at 16:15

## 2018-05-14 RX ADMIN — Medication 400 MILLIGRAM(S): at 15:00

## 2018-05-14 RX ADMIN — Medication 300 MILLIGRAM(S): at 06:03

## 2018-05-14 RX ADMIN — PANTOPRAZOLE SODIUM 40 MILLIGRAM(S): 20 TABLET, DELAYED RELEASE ORAL at 11:24

## 2018-05-14 RX ADMIN — SIMVASTATIN 10 MILLIGRAM(S): 20 TABLET, FILM COATED ORAL at 21:37

## 2018-05-14 RX ADMIN — ONDANSETRON 4 MILLIGRAM(S): 8 TABLET, FILM COATED ORAL at 17:51

## 2018-05-14 RX ADMIN — HYDROMORPHONE HYDROCHLORIDE 0.5 MILLIGRAM(S): 2 INJECTION INTRAMUSCULAR; INTRAVENOUS; SUBCUTANEOUS at 15:25

## 2018-05-14 RX ADMIN — TIOTROPIUM BROMIDE 1 CAPSULE(S): 18 CAPSULE ORAL; RESPIRATORY (INHALATION) at 09:21

## 2018-05-14 RX ADMIN — PIPERACILLIN AND TAZOBACTAM 25 GRAM(S): 4; .5 INJECTION, POWDER, LYOPHILIZED, FOR SOLUTION INTRAVENOUS at 21:37

## 2018-05-14 RX ADMIN — HYDROMORPHONE HYDROCHLORIDE 1 MILLIGRAM(S): 2 INJECTION INTRAMUSCULAR; INTRAVENOUS; SUBCUTANEOUS at 21:37

## 2018-05-14 RX ADMIN — PIPERACILLIN AND TAZOBACTAM 25 GRAM(S): 4; .5 INJECTION, POWDER, LYOPHILIZED, FOR SOLUTION INTRAVENOUS at 06:03

## 2018-05-14 RX ADMIN — HYDROMORPHONE HYDROCHLORIDE 0.5 MILLIGRAM(S): 2 INJECTION INTRAMUSCULAR; INTRAVENOUS; SUBCUTANEOUS at 15:41

## 2018-05-14 RX ADMIN — LISINOPRIL 20 MILLIGRAM(S): 2.5 TABLET ORAL at 06:03

## 2018-05-14 RX ADMIN — Medication 25 MILLIGRAM(S): at 17:51

## 2018-05-14 RX ADMIN — Medication 1000 MILLIGRAM(S): at 16:15

## 2018-05-14 RX ADMIN — Medication 25 MILLIGRAM(S): at 06:03

## 2018-05-14 RX ADMIN — SODIUM CHLORIDE 75 MILLILITER(S): 9 INJECTION INTRAMUSCULAR; INTRAVENOUS; SUBCUTANEOUS at 14:57

## 2018-05-14 RX ADMIN — LISINOPRIL 20 MILLIGRAM(S): 2.5 TABLET ORAL at 17:51

## 2018-05-14 NOTE — BRIEF OPERATIVE NOTE - PROCEDURE
<<-----Click on this checkbox to enter Procedure Laparoscopic appendectomy  05/14/2018    Active  SPERVEEN

## 2018-05-14 NOTE — CONSULT NOTE ADULT - SUBJECTIVE AND OBJECTIVE BOX
HPI:  Pt seen and examined at bedside with chaperone. Pt is AAOx3, pt in no acute distress. Pt has c/o progressively worsening right lower quadrant pain since 5/11/18. Pt denied c/o fever, chills, chest pain, SOB, N/V/D, extremity pain or dysfunction, hemoptysis, hematemesis, hematuria, hematochexia, headache, diplopia, vertigo, dizzyness. Pt tolerating diet, (+) void, (+) ambulation, (+) bowel function    Pt on eliquis anticoagulation, last dose was evening 5/12/18 (13 May 2018 18:06)      Patient is a 73y old  Female who presents with a chief complaint of abd pain since 5/11/18 (13 May 2018 18:06)      Consulted by Dr. Dr. Linda Toscano  for VTE prophylaxis, risk stratification, and anticoagulation management.    PAST MEDICAL & SURGICAL HISTORY:  Hyperlipidemia, unspecified hyperlipidemia type  Malignant neoplasm of left female breast, unspecified estrogen receptor status, unspecified site of breast  Chronic obstructive pulmonary disease, unspecified COPD type  Hypertension, unspecified type  Afib  Pacemaker  S/P right knee arthroscopy  Artificial pacemaker  H/O mastectomy, left    Caprini VTE Risk Score: 8    [id number] Score: 3    IMPROVE Bleeding Risk Score: 2.5    Falls Risk:   High (  )  Mod (  )  Low (x  )    EBL:   ml  crcrl: 51.9    5-14-18 Pt seen at bedside this a.m. before planned appendectomy.  discussed her anticoagulation with Eliquis and she has taken her last dose on 5-12 p.m. dose. Has been on it for about 2 years.  Informed her we will resume her Eliquis after surgery as long as it is safe.  questions answered will reinforce as needed.   Vital Signs Last 24 Hrs  T(C): 36.7 (14 May 2018 11:00), Max: 37.2 (13 May 2018 15:49)  T(F): 98 (14 May 2018 11:00), Max: 99 (13 May 2018 15:49)  HR: 65 (14 May 2018 11:00) (62 - 78)  BP: 114/49 (14 May 2018 11:00) (114/49 - 129/60)  BP(mean): --  RR: 18 (14 May 2018 11:00) (16 - 18)  SpO2: 98% (14 May 2018 11:00) (95% - 98%)  FAMILY HISTORY:  No pertinent family history in first degree relatives    Denies any personal or familial history of clotting or bleeding disorders.    Allergies    No Known Allergies    Intolerances    Mobic (Other)      REVIEW OF SYSTEMS    (  )Fever	     (  )Constipation	(  )SOB				(  )Headache	(  )Dysuria  (  )Chills	     (  )Melena	(  )Dyspnea present on exertion	                    (  )Dizziness                    (  )Polyuria  (  )Nausea	     (  )Hematochezia	(  )Cough			                    (  )Syncope   	(  )Hematuria  (  )Vomiting    (  )Chest Pain	(  )Wheezing			(  )Weakness  (  )Diarrhea     (  )Palpitations	(  )Anorexia			(  )Myalgia    All other review of systems negative: Yes      PHYSICAL EXAM:    Constitutional: Appears Well    Neurological: A& O x 3    Skin: Warm    Respiratory and Thorax: normal effort; Breath sounds: normal; No rales/wheezing/rhonchi  	  Cardiovascular: S1, S2, regular, NMBR	    Gastrointestinal: BS + x 4Q, nontender	    Genitourinary:  Bladder nondistended, nontender    Musculoskeletal:   General Right:   no muscle/joint tenderness,   normal tone, no joint swelling,   ROM: full	    General Left:   no muscle/joint tenderness,   normal tone, no joint swelling,   ROM: full    Lower extrems:   Right: no calf tenderness              negative julián's sign               + pedal pulses    Left:   no calf tenderness              negative julián's sign               + pedal pulses                          14.6   8.74  )-----------( 290      ( 14 May 2018 07:45 )             43.9       05-14    143  |  112<H>  |  10  ----------------------------<  109<H>  3.8   |  22  |  1.00    Ca    8.7      14 May 2018 07:45    TPro  7.7  /  Alb  4.0  /  TBili  0.8  /  DBili  x   /  AST  26  /  ALT  24  /  AlkPhos  60  05-13      PT/INR - ( 13 May 2018 13:10 )   PT: 14.3 sec;   INR: 1.32 ratio         PTT - ( 13 May 2018 13:10 )  PTT:36.8 sec				    MEDICATIONS  (STANDING):          DVT Prophylaxis:  LMWH                   (  )  Heparin SQ           (  )  Coumadin             (  )  Xarelto                  (  )  Eliquis                   ( hold )  Venodynes           ( x )  Ambulation          (x  )  UFH                       (  )  Contraindicated  (  )

## 2018-05-14 NOTE — PHYSICAL THERAPY INITIAL EVALUATION ADULT - MODALITIES TREATMENT COMMENTS
The pt was returned supine and adjusted for comfort in bed, the pt was in NAD at end of tx, CB, tray and phone in place. The pt responded well to therex review and transfer/ ambulation tx. The pt was only limited by increased/ slight labored breathing with prolonged ambulation.

## 2018-05-14 NOTE — CONSULT NOTE ADULT - ASSESSMENT
Problem: Acute appendicitis, unspecified acute appendicitis type. Recommendation: NPO. IVF and IV Zosyn. Patient will be taken to OR tomorrow due to being on Eliquis and patient also agreed.  No  contraindication to surgery and cleared from a cardiac standpoint.     Problem/Recommendation - 2:  ·  Problem: PAF (paroxysmal atrial fibrillation).  Recommendation: Hold Eliquis. History of PAF with less than 6-7% burden on pacemaker interrogation. Hold Eliquis, and restart postop. No previous history of TIA/CVA. [id number] score 3. Patient will be on SQ heparin for DVT prophylaxis.     Problem/Recommendation - 3:  ·  Problem: Hypertension, unspecified type.  Recommendation: Continue medications with holding parameters. Monitor.     Problem/Recommendation - 4:  ·  Problem: Hyperlipidemia, unspecified hyperlipidemia type.  Recommendation: Statin with diet.     Problem/Recommendation - 5:  ·  Problem: Chronic obstructive pulmonary disease, unspecified COPD type.  Recommendation: Spriva. Will add Albuterol nebulizer as needed and prior to OR.     Problem/Recommendation - 6:  Problem: CKD (chronic kidney disease), stage II. Recommendation: Suspected old. Monitor.    Problem/Recommendation - 7:  Problem: Pancreatic abnormality. Recommendation: To follow with GI outpatient- Dr. Gómez. A copy of CT scan given to patient.    Problem/Recommendation - 8:  Problem: Hepatomegaly. Recommendation: Patient known about fatty infiltration of liver. To follow with GI outpatient- Dr. Gómez. A copy of CT scan given to patient.    Problem/Recommendation - 9:  Problem: Cholelithiasis. Recommendation: To follow with GI outpatient- Dr. Gómez.

## 2018-05-14 NOTE — PHYSICAL THERAPY INITIAL EVALUATION ADULT - PLANNED THERAPY INTERVENTIONS, PT EVAL
No need for continued skilled acute care PT, please re-order PT PRN. The pt should ambulate under nursing supervision.

## 2018-05-14 NOTE — BRIEF OPERATIVE NOTE - OPERATION/FINDINGS
very inflamed, retrocecal appendix reaching up to liver, with dense phlegmon around the tip, moderate periappendiceal , pericolonic inflammation, no perforation.

## 2018-05-14 NOTE — CONSULT NOTE ADULT - ASSESSMENT
1) Moderately Severe COPD  2) Atelectasis  3) History of Anticoagulation  4) Preoperative Risk Stratification   5) Emphysema    72 yo F, history of emphysema, quantified on PFT's as moderately severe  COPD, presents with appendicitis.   Has a history of 1.5ppd X 40 years, quit smoking 10 years ago, Has a history of AF/SSS, has a pacemaker and currently anticoagulated on Eliquis.  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  Has not been on O2, enrolled in pulmonary rehabilitation and completed the full course and currently goes once a week for maintenance .  Functionally, MET is >10 and patient can complete all ADL's without any difficulty.  Based on this information, there are no pulmonary contraindications for the patient to proceed with the planned procedure.  Agree with Cardiology consultation/assess bleeding risk, patient states last dose of Eliquis was 5/12  From a medication perspective, would continue Spiriva and Advair while inpatient  Incentive spirometry post procedure  PT/OT; mobilize out of bed as tolerated  Thank you for the consult; will continue to follow. 1) Moderately Severe COPD  2) Atelectasis  3) History of Anticoagulation  4) Preoperative Risk Stratification   5) Emphysema    72 yo F, history of emphysema, quantified on PFT's as moderately severe  COPD, presents with appendicitis.   Has a history of 1.5ppd X 40 years, quit smoking 10 years ago, Has a history of AF/SSS, has a pacemaker and currently anticoagulated on Eliquis.  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  Has not been on O2, enrolled in pulmonary rehabilitation and completed the full course and currently goes once a week for maintenance .  Functionally, MET is >10 and patient can complete all ADL's without any difficulty.  Not in any acute distress on examination and patient states she does not have a cough/dyspnea.  Based on this information, there are no pulmonary contraindications for the patient to proceed with the planned procedure.  Agree with Cardiology consultation/assess bleeding risk, patient states last dose of Eliquis was 5/12  From a medication perspective, would continue Spiriva and Advair while inpatient  Incentive spirometry post procedure  IV Antibiotics for appendicitis  PT/OT; mobilize out of bed as tolerated  Thank you for the consult; will continue to follow.

## 2018-05-14 NOTE — CONSULT NOTE ADULT - SUBJECTIVE AND OBJECTIVE BOX
CHIEF COMPLAINT:    HPI:  Pt seen and examined at bedside with chaperone. Pt is AAOx3, pt in no acute distress. Pt has c/o progressively worsening right lower quadrant pain since 5/11/18. Pt denied c/o fever, chills, chest pain, SOB, N/V/D, extremity pain or dysfunction, hemoptysis, hematemesis, hematuria, hematochexia, headache, diplopia, vertigo, dizzyness. Pt tolerating diet, (+) void, (+) ambulation, (+) bowel function    Pt on eliquis anticoagulation, last dose was evening 5/12/18     EKG on admission showed atrial pacing rate 61. No AF.      PAST MEDICAL & SURGICAL HISTORY:  Hyperlipidemia, unspecified hyperlipidemia type  Malignant neoplasm of left female breast, unspecified estrogen receptor status, unspecified site of breast  Chronic obstructive pulmonary disease, unspecified COPD type  Hypertension, unspecified type  Afib  Pacemaker  S/P right knee arthroscopy  Artificial pacemaker  H/O mastectomy, left      Allergies    No Known Allergies    Intolerances    Mobic (Other)      Occupation:  Servoyantol: Denied  Smoking: Denied  Drug Use: Denied  Marital Status:         FAMILY HISTORY:  No pertinent family history in first degree relatives      REVIEW OF SYSTEMS:    CONSTITUTIONAL: No weakness, fevers or chills  EYES/ENT: No visual changes;  No vertigo or throat pain   NECK: No pain or stiffness  RESPIRATORY: No cough, wheezing, hemoptysis; No shortness of breath  CARDIOVASCULAR: No chest pain or palpitations  GASTROINTESTINAL: No abdominal or epigastric pain. No nausea, vomiting, or hematemesis; No diarrhea or constipation. No melena or hematochezia.  GENITOURINARY: No dysuria, frequency or hematuria  NEUROLOGICAL: No numbness or weakness  SKIN: No itching, burning, rashes, or lesions   All other review of systems is negative unless indicated above    Vital Signs Last 24 Hrs  T(C): 36.9 (13 May 2018 20:31), Max: 37.2 (13 May 2018 15:49)  T(F): 98.5 (13 May 2018 20:31), Max: 99 (13 May 2018 15:49)  HR: 78 (14 May 2018 06:02) (62 - 78)  BP: 119/62 (14 May 2018 06:02) (112/66 - 129/60)  BP(mean): --  RR: 18 (13 May 2018 20:31) (16 - 18)  SpO2: 96% (13 May 2018 20:31) (95% - 97%)    I&O's Summary    13 May 2018 07:01  -  14 May 2018 07:00  --------------------------------------------------------  IN: 1090 mL / OUT: 0 mL / NET: 1090 mL        PHYSICAL EXAM:    Constitutional: NAD, awake and alert, well-developed  HEENT: PERR, EOMI,  No oral cyananosis.  Neck:  supple,  No JVD  Respiratory: Breath sounds are clear bilaterally, No wheezing, rales or rhonchi  Cardiovascular: S1 and S2, regular rate and rhythm, no Murmurs, gallops or rubs  Gastrointestinal: Bowel Sounds present, soft, nontender.   Extremities: No peripheral edema. No clubbing or cyanosis.  Vascular: 2+ peripheral pulses  Neurological: A/O x 3, no focal deficits  Musculoskeletal: no calf tenderness.  Skin: No rashes.    MEDICATIONS:  MEDICATIONS  (STANDING):  ALBUTerol    90 MICROgram(s) HFA Inhaler 2 Puff(s) Inhalation once  diltiazem    Tablet 60 milliGRAM(s) Oral at bedtime  diltiazem    milliGRAM(s) Oral daily  heparin  Injectable 5000 Unit(s) SubCutaneous every 12 hours  lisinopril 20 milliGRAM(s) Oral two times a day  metoprolol tartrate 25 milliGRAM(s) Oral two times a day  pantoprazole  Injectable 40 milliGRAM(s) IV Push daily  piperacillin/tazobactam IVPB. 3.375 Gram(s) IV Intermittent every 8 hours  simvastatin 10 milliGRAM(s) Oral at bedtime  sodium chloride 0.9%. 1000 milliLiter(s) (100 mL/Hr) IV Continuous <Continuous>  tiotropium 18 MICROgram(s) Capsule 1 Capsule(s) Inhalation daily      LABS: All Labs Reviewed:                        14.6   8.74  )-----------( 290      ( 14 May 2018 07:45 )             43.9                         15.5   11.02 )-----------( 281      ( 13 May 2018 12:22 )             45.9     14 May 2018 07:45    143    |  112    |  10     ----------------------------<  109    3.8     |  22     |  1.00   13 May 2018 12:22    138    |  107    |  15     ----------------------------<  99     4.5     |  23     |  1.21     Ca    8.7        14 May 2018 07:45  Ca    9.3        13 May 2018 12:22    TPro  7.7    /  Alb  4.0    /  TBili  0.8    /  DBili  x      /  AST  26     /  ALT  24     /  AlkPhos  60     13 May 2018 12:22    PT/INR - ( 13 May 2018 13:10 )   PT: 14.3 sec;   INR: 1.32 ratio         PTT - ( 13 May 2018 13:10 )  PTT:36.8 sec      Blood Culture:         RADIOLOGY/EKG:

## 2018-05-14 NOTE — CONSULT NOTE ADULT - SUBJECTIVE AND OBJECTIVE BOX
Patient is a 73y old  Female who presents with a chief complaint of abd pain since 18 (13 May 2018 18:06)      HPI:  Pt seen and examined at bedside with chaperone. Pt is AAOx3, pt in no acute distress. Pt has c/o progressively worsening right lower quadrant pain since 18. Pt denied c/o fever, chills, chest pain, SOB, N/V/D, extremity pain or dysfunction, hemoptysis, hematemesis, hematuria, hematochexia, headache, diplopia, vertigo, dizzyness. Pt tolerating diet, (+) void, (+) ambulation, (+) bowel function    Pt on eliquis anticoagulation, last dose was evening 18 (13 May 2018 18:06)      PAST MEDICAL & SURGICAL HISTORY:  Hyperlipidemia, unspecified hyperlipidemia type  Malignant neoplasm of left female breast, unspecified estrogen receptor status, unspecified site of breast  Chronic obstructive pulmonary disease, unspecified COPD type  Hypertension, unspecified type  Afib  Pacemaker  S/P right knee arthroscopy  Artificial pacemaker  H/O mastectomy, left      PREVIOUS DIAGNOSTIC TESTING:      MEDICATIONS  (STANDING):  ALBUTerol    90 MICROgram(s) HFA Inhaler 2 Puff(s) Inhalation once  diltiazem    Tablet 60 milliGRAM(s) Oral at bedtime  diltiazem    milliGRAM(s) Oral daily  heparin  Injectable 5000 Unit(s) SubCutaneous every 12 hours  lisinopril 20 milliGRAM(s) Oral two times a day  metoprolol tartrate 25 milliGRAM(s) Oral two times a day  pantoprazole  Injectable 40 milliGRAM(s) IV Push daily  piperacillin/tazobactam IVPB. 3.375 Gram(s) IV Intermittent every 8 hours  simvastatin 10 milliGRAM(s) Oral at bedtime  sodium chloride 0.9%. 1000 milliLiter(s) (100 mL/Hr) IV Continuous <Continuous>  tiotropium 18 MICROgram(s) Capsule 1 Capsule(s) Inhalation daily    MEDICATIONS  (PRN):  acetaminophen   Tablet 650 milliGRAM(s) Oral every 6 hours PRN For Temp greater than 38 C (100.4 F)  ALBUTerol    90 MICROgram(s) HFA Inhaler 2 Puff(s) Inhalation every 6 hours PRN Shortness of Breath and/or Wheezing  HYDROmorphone  Injectable 0.5 milliGRAM(s) IV Push every 4 hours PRN Moderate Pain  HYDROmorphone  Injectable 1 milliGRAM(s) IV Push every 4 hours PRN Severe Pain  ondansetron Injectable 4 milliGRAM(s) IV Push every 6 hours PRN Nausea      FAMILY HISTORY:  No pertinent family history in first degree relatives      SOCIAL HISTORY:  ***    REVIEW OF SYSTEM:  Pertinent items are noted in HPI.  Constitutional negative for chills, fevers, sweats and weight loss  throat, and face:  negative for epistaxis, nasal congestion, sore throat and   tinnitus  Respiratory: negative for cough, dyspnea on exertion, pleuritic chest pain  and wheezing  Cardiovascular:  negative for chest pain, dyspnea and palpitations  Gastrointestinal: negative for abdominal pain, diarrhea, nausea and vomiting  Genitourinary: negative for dysuria, frequency and urinary incontinence  Skin:  negative for redness, rash, pruritus, swelling, dryness and   fissures  Hematologic/lymphatic: negative for bleeding and easy bruising  Musculoskeletal: negative for arthralgias, back pain and muscle weakness  Neurological: negative for dizziness, headaches, seizures and tremors  Behavioral/Psych:  negative for mood change, depression, suicidal attempts    Allergic/Immunologic: negative for anaphylaxis, angioedema and urticaria    Vital Signs Last 24 Hrs  T(C): 36.9 (13 May 2018 20:31), Max: 37.2 (13 May 2018 15:49)  T(F): 98.5 (13 May 2018 20:31), Max: 99 (13 May 2018 15:49)  HR: 78 (14 May 2018 06:02) (62 - 78)  BP: 119/62 (14 May 2018 06:02) (112/66 - 129/60)  BP(mean): --  RR: 18 (13 May 2018 20:31) (16 - 18)  SpO2: 96% (13 May 2018 20:31) (95% - 97%)    I&O's Summary    13 May 2018 07:01  -  14 May 2018 07:00  --------------------------------------------------------  IN: 1090 mL / OUT: 0 mL / NET: 1090 mL      PHYSICAL EXAM  General Appearance: cooperative, no acute distress,   HEENT: PERRL, conjunctiva clear, EOM's intact, non injected pharynx, no exudate, TM   normal  Neck: Supple, , no adenopathy, thyroid: not enlarged, no carotid bruit or JVD  Back: Symmetric, no  tenderness,no soft tissue tenderness  Lungs: Clear to auscultation bilaterally  Heart: +S1,S2  Abdomen: Soft, non-tender, bowel sounds active , no hepatosplenomegaly  Extremities: no cyanosis or edema, no joint swelling  Skin: Skin color, texture normal, no rashes   Neurologic: Alert and oriented X3 , cranial nerves intact, sensory and motor normal,    ECG:    LABS:                          14.6   8.74  )-----------( 290      ( 14 May 2018 07:45 )             43.9     05-    138  |  107  |  15  ----------------------------<  99  4.5   |  23  |  1.21    Ca    9.3      13 May 2018 12:22    TPro  7.7  /  Alb  4.0  /  TBili  0.8  /  DBili  x   /  AST  26  /  ALT  24  /  AlkPhos  60  05-13            PT/INR - ( 13 May 2018 13:10 )   PT: 14.3 sec;   INR: 1.32 ratio         PTT - ( 13 May 2018 13:10 )  PTT:36.8 sec  Urinalysis Basic - ( 13 May 2018 13:05 )    Color: Yellow / Appearance: Clear / S.005 / pH: x  Gluc: x / Ketone: Negative  / Bili: Negative / Urobili: Negative mg/dL   Blood: x / Protein: Negative mg/dL / Nitrite: Negative   Leuk Esterase: Trace / RBC: 0-2 /HPF / WBC 3-5   Sq Epi: x / Non Sq Epi: Occasional / Bacteria: Few            RADIOLOGY & ADDITIONAL STUDIES:    Impression    Prominence of the appendix with surrounding soft tissue infiltration most   consistent with appendicitis in the correct clinical context. No   appendicolith, collection or evidence of perforation.    Solitary subcentimeter gallbladder calculus with no other acute findings    Slight prominence of the pancreatic head with areas of decreased   attenuation most consistent with fatty infiltration, no gross mass.    Hepatomegaly with diffuse fatty infiltration, no focal mass or   intrahepatic biliary ductal dilatation.    CXR  Left cardiac device and surgical clips.    Atelectasis lung bases. No consolidation or pleural effusion.    Heart size cannot be accurately assessed in this projection. Patient is a 73y old  Female who presents with a chief complaint of abd pain since 18 (13 May 2018 18:06)      HPI:  Pt seen and examined at bedside with chaperone. Pt is AAOx3, pt in no acute distress. Pt has c/o progressively worsening right lower quadrant pain since 18. Pt denied c/o fever, chills, chest pain, SOB, N/V/D, extremity pain or dysfunction, hemoptysis, hematemesis, hematuria, hematochexia, headache, diplopia, vertigo, dizzyness. Pt tolerating diet, (+) void, (+) ambulation, (+) bowel function    Pt on eliquis anticoagulation, last dose was evening 18 (13 May 2018 18:06)      PAST MEDICAL & SURGICAL HISTORY:  Hyperlipidemia, unspecified hyperlipidemia type  Malignant neoplasm of left female breast, unspecified estrogen receptor status, unspecified site of breast  Chronic obstructive pulmonary disease, unspecified COPD type  Hypertension, unspecified type  Afib  Pacemaker  S/P right knee arthroscopy  Artificial pacemaker  H/O mastectomy, left      PREVIOUS DIAGNOSTIC TESTING:      MEDICATIONS  (STANDING):  ALBUTerol    90 MICROgram(s) HFA Inhaler 2 Puff(s) Inhalation once  diltiazem    Tablet 60 milliGRAM(s) Oral at bedtime  diltiazem    milliGRAM(s) Oral daily  heparin  Injectable 5000 Unit(s) SubCutaneous every 12 hours  lisinopril 20 milliGRAM(s) Oral two times a day  metoprolol tartrate 25 milliGRAM(s) Oral two times a day  pantoprazole  Injectable 40 milliGRAM(s) IV Push daily  piperacillin/tazobactam IVPB. 3.375 Gram(s) IV Intermittent every 8 hours  simvastatin 10 milliGRAM(s) Oral at bedtime  sodium chloride 0.9%. 1000 milliLiter(s) (100 mL/Hr) IV Continuous <Continuous>  tiotropium 18 MICROgram(s) Capsule 1 Capsule(s) Inhalation daily    MEDICATIONS  (PRN):  acetaminophen   Tablet 650 milliGRAM(s) Oral every 6 hours PRN For Temp greater than 38 C (100.4 F)  ALBUTerol    90 MICROgram(s) HFA Inhaler 2 Puff(s) Inhalation every 6 hours PRN Shortness of Breath and/or Wheezing  HYDROmorphone  Injectable 0.5 milliGRAM(s) IV Push every 4 hours PRN Moderate Pain  HYDROmorphone  Injectable 1 milliGRAM(s) IV Push every 4 hours PRN Severe Pain  ondansetron Injectable 4 milliGRAM(s) IV Push every 6 hours PRN Nausea      FAMILY HISTORY:  No pertinent family history in first degree relatives      SOCIAL HISTORY: Works as a nurse    REVIEW OF SYSTEM:  +Abdominal pain, denies dyspnea, otherwise 12 point ROS negativ e    Vital Signs Last 24 Hrs  T(C): 36.9 (13 May 2018 20:31), Max: 37.2 (13 May 2018 15:49)  T(F): 98.5 (13 May 2018 20:31), Max: 99 (13 May 2018 15:49)  HR: 78 (14 May 2018 06:02) (62 - 78)  BP: 119/62 (14 May 2018 06:02) (112/66 - 129/60)  BP(mean): --  RR: 18 (13 May 2018 20:31) (16 - 18)  SpO2: 96% (13 May 2018 20:31) (95% - 97%)    I&O's Summary    13 May 2018 07:01  -  14 May 2018 07:00  --------------------------------------------------------  IN: 1090 mL / OUT: 0 mL / NET: 1090 mL      PHYSICAL EXAM  General Appearance: cooperative, no acute distress,   HEENT: PERRL, conjunctiva clear, EOM's intact, non injected pharynx, no exudate, TM   normal  Neck: Supple, , no adenopathy, thyroid: not enlarged, no carotid bruit or JVD  Back: Symmetric, no  tenderness,no soft tissue tenderness  Lungs: Clear to auscultation bilaterally  Heart: +S1,S2  Abdomen: Soft, non-tender, bowel sounds active , no hepatosplenomegaly  Extremities: no cyanosis or edema, no joint swelling  Skin: Skin color, texture normal, no rashes   Neurologic: Alert and oriented X3 , cranial nerves intact, sensory and motor normal,    ECG:    LABS:                          14.6   8.74  )-----------( 290      ( 14 May 2018 07:45 )             43.9     05-13    138  |  107  |  15  ----------------------------<  99  4.5   |  23  |  1.21    Ca    9.3      13 May 2018 12:22    TPro  7.7  /  Alb  4.0  /  TBili  0.8  /  DBili  x   /  AST  26  /  ALT  24  /  AlkPhos  60  05-13            PT/INR - ( 13 May 2018 13:10 )   PT: 14.3 sec;   INR: 1.32 ratio         PTT - ( 13 May 2018 13:10 )  PTT:36.8 sec  Urinalysis Basic - ( 13 May 2018 13:05 )    Color: Yellow / Appearance: Clear / S.005 / pH: x  Gluc: x / Ketone: Negative  / Bili: Negative / Urobili: Negative mg/dL   Blood: x / Protein: Negative mg/dL / Nitrite: Negative   Leuk Esterase: Trace / RBC: 0-2 /HPF / WBC 3-5   Sq Epi: x / Non Sq Epi: Occasional / Bacteria: Few            RADIOLOGY & ADDITIONAL STUDIES:    Impression    Prominence of the appendix with surrounding soft tissue infiltration most   consistent with appendicitis in the correct clinical context. No   appendicolith, collection or evidence of perforation.    Solitary subcentimeter gallbladder calculus with no other acute findings    Slight prominence of the pancreatic head with areas of decreased   attenuation most consistent with fatty infiltration, no gross mass.    Hepatomegaly with diffuse fatty infiltration, no focal mass or   intrahepatic biliary ductal dilatation.    CXR  Left cardiac device and surgical clips.    Atelectasis lung bases. No consolidation or pleural effusion.    Heart size cannot be accurately assessed in this projection.

## 2018-05-14 NOTE — CONSULT NOTE ADULT - ASSESSMENT
This is a 73 year old female who came into  ed on 5-13-18 for worsening right lower quadrant pain.  Pt is scheduled for surg today for appendectomy  His of A. Fib on eliquis 5mg po twice a day for about 2years for A. Fib.  Last dose on the evening of 5-12-18.   [id number] Score: 3    Plan:  Hold eliquis until post procedure.  Resume after procedure  if pt cleared by surgeon.  : daily cbc/bmp  :oob as per surg.  :jessika RICK  Thanks for consult will f/u

## 2018-05-15 LAB
ANION GAP SERPL CALC-SCNC: 7 MMOL/L — SIGNIFICANT CHANGE UP (ref 5–17)
BUN SERPL-MCNC: 9 MG/DL — SIGNIFICANT CHANGE UP (ref 7–23)
CALCIUM SERPL-MCNC: 8.8 MG/DL — SIGNIFICANT CHANGE UP (ref 8.5–10.1)
CHLORIDE SERPL-SCNC: 109 MMOL/L — HIGH (ref 96–108)
CO2 SERPL-SCNC: 24 MMOL/L — SIGNIFICANT CHANGE UP (ref 22–31)
CREAT SERPL-MCNC: 1.02 MG/DL — SIGNIFICANT CHANGE UP (ref 0.5–1.3)
GLUCOSE SERPL-MCNC: 104 MG/DL — HIGH (ref 70–99)
HCT VFR BLD CALC: 38.4 % — SIGNIFICANT CHANGE UP (ref 34.5–45)
HGB BLD-MCNC: 12.7 G/DL — SIGNIFICANT CHANGE UP (ref 11.5–15.5)
MCHC RBC-ENTMCNC: 31 PG — SIGNIFICANT CHANGE UP (ref 27–34)
MCHC RBC-ENTMCNC: 33.1 GM/DL — SIGNIFICANT CHANGE UP (ref 32–36)
MCV RBC AUTO: 93.7 FL — SIGNIFICANT CHANGE UP (ref 80–100)
NRBC # BLD: 0 /100 WBCS — SIGNIFICANT CHANGE UP (ref 0–0)
PLATELET # BLD AUTO: 251 K/UL — SIGNIFICANT CHANGE UP (ref 150–400)
POTASSIUM SERPL-MCNC: 4.4 MMOL/L — SIGNIFICANT CHANGE UP (ref 3.5–5.3)
POTASSIUM SERPL-SCNC: 4.4 MMOL/L — SIGNIFICANT CHANGE UP (ref 3.5–5.3)
RBC # BLD: 4.1 M/UL — SIGNIFICANT CHANGE UP (ref 3.8–5.2)
RBC # FLD: 13.6 % — SIGNIFICANT CHANGE UP (ref 10.3–14.5)
SODIUM SERPL-SCNC: 140 MMOL/L — SIGNIFICANT CHANGE UP (ref 135–145)
WBC # BLD: 9.58 K/UL — SIGNIFICANT CHANGE UP (ref 3.8–10.5)
WBC # FLD AUTO: 9.58 K/UL — SIGNIFICANT CHANGE UP (ref 3.8–10.5)

## 2018-05-15 PROCEDURE — 99233 SBSQ HOSP IP/OBS HIGH 50: CPT

## 2018-05-15 PROCEDURE — 71045 X-RAY EXAM CHEST 1 VIEW: CPT | Mod: 26

## 2018-05-15 RX ORDER — ALBUTEROL 90 UG/1
2.5 AEROSOL, METERED ORAL EVERY 6 HOURS
Qty: 0 | Refills: 0 | Status: DISCONTINUED | OUTPATIENT
Start: 2018-05-15 | End: 2018-05-17

## 2018-05-15 RX ORDER — BUDESONIDE, MICRONIZED 100 %
0.5 POWDER (GRAM) MISCELLANEOUS
Qty: 0 | Refills: 0 | Status: DISCONTINUED | OUTPATIENT
Start: 2018-05-15 | End: 2018-05-17

## 2018-05-15 RX ORDER — SODIUM CHLORIDE 9 MG/ML
250 INJECTION INTRAMUSCULAR; INTRAVENOUS; SUBCUTANEOUS ONCE
Qty: 0 | Refills: 0 | Status: COMPLETED | OUTPATIENT
Start: 2018-05-15 | End: 2018-05-15

## 2018-05-15 RX ORDER — IPRATROPIUM/ALBUTEROL SULFATE 18-103MCG
3 AEROSOL WITH ADAPTER (GRAM) INHALATION ONCE
Qty: 0 | Refills: 0 | Status: COMPLETED | OUTPATIENT
Start: 2018-05-15 | End: 2018-05-15

## 2018-05-15 RX ORDER — APIXABAN 2.5 MG/1
5 TABLET, FILM COATED ORAL EVERY 12 HOURS
Qty: 0 | Refills: 0 | Status: DISCONTINUED | OUTPATIENT
Start: 2018-05-16 | End: 2018-05-17

## 2018-05-15 RX ORDER — HEPARIN SODIUM 5000 [USP'U]/ML
5000 INJECTION INTRAVENOUS; SUBCUTANEOUS ONCE
Qty: 0 | Refills: 0 | Status: COMPLETED | OUTPATIENT
Start: 2018-05-15 | End: 2018-05-15

## 2018-05-15 RX ORDER — ALBUTEROL 90 UG/1
1.25 AEROSOL, METERED ORAL EVERY 6 HOURS
Qty: 0 | Refills: 0 | Status: DISCONTINUED | OUTPATIENT
Start: 2018-05-15 | End: 2018-05-15

## 2018-05-15 RX ADMIN — PANTOPRAZOLE SODIUM 40 MILLIGRAM(S): 20 TABLET, DELAYED RELEASE ORAL at 13:23

## 2018-05-15 RX ADMIN — SIMVASTATIN 10 MILLIGRAM(S): 20 TABLET, FILM COATED ORAL at 21:36

## 2018-05-15 RX ADMIN — Medication 25 MILLIGRAM(S): at 05:16

## 2018-05-15 RX ADMIN — HEPARIN SODIUM 5000 UNIT(S): 5000 INJECTION INTRAVENOUS; SUBCUTANEOUS at 17:25

## 2018-05-15 RX ADMIN — Medication 0.5 MILLIGRAM(S): at 20:34

## 2018-05-15 RX ADMIN — SODIUM CHLORIDE 250 MILLILITER(S): 9 INJECTION INTRAMUSCULAR; INTRAVENOUS; SUBCUTANEOUS at 00:09

## 2018-05-15 RX ADMIN — LISINOPRIL 20 MILLIGRAM(S): 2.5 TABLET ORAL at 17:25

## 2018-05-15 RX ADMIN — ALBUTEROL 2.5 MILLIGRAM(S): 90 AEROSOL, METERED ORAL at 02:44

## 2018-05-15 RX ADMIN — PIPERACILLIN AND TAZOBACTAM 25 GRAM(S): 4; .5 INJECTION, POWDER, LYOPHILIZED, FOR SOLUTION INTRAVENOUS at 21:37

## 2018-05-15 RX ADMIN — TIOTROPIUM BROMIDE 1 CAPSULE(S): 18 CAPSULE ORAL; RESPIRATORY (INHALATION) at 08:13

## 2018-05-15 RX ADMIN — HYDROMORPHONE HYDROCHLORIDE 1 MILLIGRAM(S): 2 INJECTION INTRAMUSCULAR; INTRAVENOUS; SUBCUTANEOUS at 05:16

## 2018-05-15 RX ADMIN — Medication 200 MILLIGRAM(S): at 02:11

## 2018-05-15 RX ADMIN — PIPERACILLIN AND TAZOBACTAM 25 GRAM(S): 4; .5 INJECTION, POWDER, LYOPHILIZED, FOR SOLUTION INTRAVENOUS at 13:23

## 2018-05-15 RX ADMIN — PIPERACILLIN AND TAZOBACTAM 25 GRAM(S): 4; .5 INJECTION, POWDER, LYOPHILIZED, FOR SOLUTION INTRAVENOUS at 05:16

## 2018-05-15 RX ADMIN — HEPARIN SODIUM 5000 UNIT(S): 5000 INJECTION INTRAVENOUS; SUBCUTANEOUS at 05:16

## 2018-05-15 RX ADMIN — LISINOPRIL 20 MILLIGRAM(S): 2.5 TABLET ORAL at 05:16

## 2018-05-15 RX ADMIN — Medication 650 MILLIGRAM(S): at 21:36

## 2018-05-15 RX ADMIN — Medication 25 MILLIGRAM(S): at 17:25

## 2018-05-15 RX ADMIN — ALBUTEROL 2.5 MILLIGRAM(S): 90 AEROSOL, METERED ORAL at 20:35

## 2018-05-15 RX ADMIN — ALBUTEROL 2.5 MILLIGRAM(S): 90 AEROSOL, METERED ORAL at 08:13

## 2018-05-15 NOTE — CDI QUERY NOTE - [id number]_GEN_ALL_CORE_HH
Documentation on chart of Hemoglobin of 15.5 on admission and dropped to 12.7 post LAP appendectomy. HCT was 45.9 on admission and dropped to 38.4 post surgery. EBL intra-op was 50 cc. Please document a diagnosis based on the clinical indicators listed above.  A) Anemia due to postop blood loss  B) Precipitous drop in Hematocrit  C) Other ( Please specify condition)

## 2018-05-16 LAB
ANION GAP SERPL CALC-SCNC: 8 MMOL/L — SIGNIFICANT CHANGE UP (ref 5–17)
BUN SERPL-MCNC: 8 MG/DL — SIGNIFICANT CHANGE UP (ref 7–23)
CALCIUM SERPL-MCNC: 8.6 MG/DL — SIGNIFICANT CHANGE UP (ref 8.5–10.1)
CHLORIDE SERPL-SCNC: 112 MMOL/L — HIGH (ref 96–108)
CO2 SERPL-SCNC: 24 MMOL/L — SIGNIFICANT CHANGE UP (ref 22–31)
CREAT SERPL-MCNC: 0.82 MG/DL — SIGNIFICANT CHANGE UP (ref 0.5–1.3)
GLUCOSE SERPL-MCNC: 105 MG/DL — HIGH (ref 70–99)
HCT VFR BLD CALC: 37.4 % — SIGNIFICANT CHANGE UP (ref 34.5–45)
HGB BLD-MCNC: 12.5 G/DL — SIGNIFICANT CHANGE UP (ref 11.5–15.5)
MCHC RBC-ENTMCNC: 30.7 PG — SIGNIFICANT CHANGE UP (ref 27–34)
MCHC RBC-ENTMCNC: 33.4 GM/DL — SIGNIFICANT CHANGE UP (ref 32–36)
MCV RBC AUTO: 91.9 FL — SIGNIFICANT CHANGE UP (ref 80–100)
NRBC # BLD: 0 /100 WBCS — SIGNIFICANT CHANGE UP (ref 0–0)
PLATELET # BLD AUTO: 255 K/UL — SIGNIFICANT CHANGE UP (ref 150–400)
POTASSIUM SERPL-MCNC: 3.6 MMOL/L — SIGNIFICANT CHANGE UP (ref 3.5–5.3)
POTASSIUM SERPL-SCNC: 3.6 MMOL/L — SIGNIFICANT CHANGE UP (ref 3.5–5.3)
RBC # BLD: 4.07 M/UL — SIGNIFICANT CHANGE UP (ref 3.8–5.2)
RBC # FLD: 13.6 % — SIGNIFICANT CHANGE UP (ref 10.3–14.5)
SODIUM SERPL-SCNC: 144 MMOL/L — SIGNIFICANT CHANGE UP (ref 135–145)
WBC # BLD: 7.73 K/UL — SIGNIFICANT CHANGE UP (ref 3.8–10.5)
WBC # FLD AUTO: 7.73 K/UL — SIGNIFICANT CHANGE UP (ref 3.8–10.5)

## 2018-05-16 PROCEDURE — 99233 SBSQ HOSP IP/OBS HIGH 50: CPT

## 2018-05-16 RX ORDER — IPRATROPIUM/ALBUTEROL SULFATE 18-103MCG
3 AEROSOL WITH ADAPTER (GRAM) INHALATION ONCE
Qty: 0 | Refills: 0 | Status: COMPLETED | OUTPATIENT
Start: 2018-05-16 | End: 2018-05-16

## 2018-05-16 RX ADMIN — ALBUTEROL 2.5 MILLIGRAM(S): 90 AEROSOL, METERED ORAL at 08:47

## 2018-05-16 RX ADMIN — LISINOPRIL 20 MILLIGRAM(S): 2.5 TABLET ORAL at 17:41

## 2018-05-16 RX ADMIN — Medication 0.5 MILLIGRAM(S): at 19:28

## 2018-05-16 RX ADMIN — PIPERACILLIN AND TAZOBACTAM 25 GRAM(S): 4; .5 INJECTION, POWDER, LYOPHILIZED, FOR SOLUTION INTRAVENOUS at 22:39

## 2018-05-16 RX ADMIN — SIMVASTATIN 10 MILLIGRAM(S): 20 TABLET, FILM COATED ORAL at 22:39

## 2018-05-16 RX ADMIN — APIXABAN 5 MILLIGRAM(S): 2.5 TABLET, FILM COATED ORAL at 17:40

## 2018-05-16 RX ADMIN — PANTOPRAZOLE SODIUM 40 MILLIGRAM(S): 20 TABLET, DELAYED RELEASE ORAL at 12:11

## 2018-05-16 RX ADMIN — TIOTROPIUM BROMIDE 1 CAPSULE(S): 18 CAPSULE ORAL; RESPIRATORY (INHALATION) at 08:47

## 2018-05-16 RX ADMIN — Medication 25 MILLIGRAM(S): at 17:41

## 2018-05-16 RX ADMIN — ALBUTEROL 2.5 MILLIGRAM(S): 90 AEROSOL, METERED ORAL at 03:44

## 2018-05-16 RX ADMIN — Medication 25 MILLIGRAM(S): at 05:32

## 2018-05-16 RX ADMIN — APIXABAN 5 MILLIGRAM(S): 2.5 TABLET, FILM COATED ORAL at 05:32

## 2018-05-16 RX ADMIN — PIPERACILLIN AND TAZOBACTAM 25 GRAM(S): 4; .5 INJECTION, POWDER, LYOPHILIZED, FOR SOLUTION INTRAVENOUS at 15:30

## 2018-05-16 RX ADMIN — Medication 0.5 MILLIGRAM(S): at 08:47

## 2018-05-16 RX ADMIN — PIPERACILLIN AND TAZOBACTAM 25 GRAM(S): 4; .5 INJECTION, POWDER, LYOPHILIZED, FOR SOLUTION INTRAVENOUS at 05:32

## 2018-05-16 NOTE — PHYSICAL THERAPY INITIAL EVALUATION ADULT - CRITERIA FOR SKILLED THERAPEUTIC INTERVENTIONS
Home/anticipated discharge recommendation
Pt demonstrates baseline level of independence across all functional mobility and does not require further skilled PT intervention while at .

## 2018-05-16 NOTE — PHYSICAL THERAPY INITIAL EVALUATION ADULT - PERTINENT HX OF CURRENT PROBLEM, REHAB EVAL
72 y/o F with a PMHx of torturous colon, s/p pacemaker (interrogated April 2018), PAF on Eliquis, HTN- Echo normal LVEF and negative nuclear stress test 2017 (as per patient), Left Breast Cancer s/p mastectomy and adjuvant chemo in remission,, Hyperlipidemia, Arthritis especially knees and Empyema presents to the ED c/o +belching, RUQ and RLQ quadrant abd pain since Friday evening, +abd distended
72 yo F, history of emphysema, quantified on PFT's as moderately severe  COPD, presents with appendicitis.

## 2018-05-16 NOTE — PHYSICAL THERAPY INITIAL EVALUATION ADULT - GENERAL OBSERVATIONS, REHAB EVAL
The pt was pleasant and cooperative and eager to ambulate with PT. The pt was received on 2N, supine with 1 IV in place, pt is NPO, pre-operative.
Pt rec'd supine in bed, pleasant and cooperative, no acute complaints, reports amb independently in room, on 3L O2 via nc

## 2018-05-16 NOTE — PHYSICAL THERAPY INITIAL EVALUATION ADULT - DIAGNOSIS, PT EVAL
Acute gangrenous appendicitis s/p lap appendectomy
73 y.o. female with Acute appendicitis/ Eliquis Management, Long Standing COPD

## 2018-05-17 ENCOUNTER — TRANSCRIPTION ENCOUNTER (OUTPATIENT)
Age: 73
End: 2018-05-17

## 2018-05-17 VITALS
SYSTOLIC BLOOD PRESSURE: 149 MMHG | DIASTOLIC BLOOD PRESSURE: 71 MMHG | RESPIRATION RATE: 16 BRPM | OXYGEN SATURATION: 94 % | HEART RATE: 94 BPM | TEMPERATURE: 98 F

## 2018-05-17 LAB
ANION GAP SERPL CALC-SCNC: 8 MMOL/L — SIGNIFICANT CHANGE UP (ref 5–17)
BUN SERPL-MCNC: 11 MG/DL — SIGNIFICANT CHANGE UP (ref 7–23)
CALCIUM SERPL-MCNC: 9.3 MG/DL — SIGNIFICANT CHANGE UP (ref 8.5–10.1)
CHLORIDE SERPL-SCNC: 108 MMOL/L — SIGNIFICANT CHANGE UP (ref 96–108)
CO2 SERPL-SCNC: 28 MMOL/L — SIGNIFICANT CHANGE UP (ref 22–31)
CREAT SERPL-MCNC: 0.96 MG/DL — SIGNIFICANT CHANGE UP (ref 0.5–1.3)
GLUCOSE SERPL-MCNC: 102 MG/DL — HIGH (ref 70–99)
HCT VFR BLD CALC: 41.6 % — SIGNIFICANT CHANGE UP (ref 34.5–45)
HGB BLD-MCNC: 14.3 G/DL — SIGNIFICANT CHANGE UP (ref 11.5–15.5)
MCHC RBC-ENTMCNC: 31.2 PG — SIGNIFICANT CHANGE UP (ref 27–34)
MCHC RBC-ENTMCNC: 34.4 GM/DL — SIGNIFICANT CHANGE UP (ref 32–36)
MCV RBC AUTO: 90.8 FL — SIGNIFICANT CHANGE UP (ref 80–100)
NRBC # BLD: 0 /100 WBCS — SIGNIFICANT CHANGE UP (ref 0–0)
PHOSPHATE SERPL-MCNC: 2.3 MG/DL — LOW (ref 2.5–4.5)
PLATELET # BLD AUTO: 317 K/UL — SIGNIFICANT CHANGE UP (ref 150–400)
POTASSIUM SERPL-MCNC: 4 MMOL/L — SIGNIFICANT CHANGE UP (ref 3.5–5.3)
POTASSIUM SERPL-SCNC: 4 MMOL/L — SIGNIFICANT CHANGE UP (ref 3.5–5.3)
RBC # BLD: 4.58 M/UL — SIGNIFICANT CHANGE UP (ref 3.8–5.2)
RBC # FLD: 13.3 % — SIGNIFICANT CHANGE UP (ref 10.3–14.5)
SODIUM SERPL-SCNC: 144 MMOL/L — SIGNIFICANT CHANGE UP (ref 135–145)
WBC # BLD: 8.2 K/UL — SIGNIFICANT CHANGE UP (ref 3.8–10.5)
WBC # FLD AUTO: 8.2 K/UL — SIGNIFICANT CHANGE UP (ref 3.8–10.5)

## 2018-05-17 PROCEDURE — 99233 SBSQ HOSP IP/OBS HIGH 50: CPT

## 2018-05-17 PROCEDURE — 93010 ELECTROCARDIOGRAM REPORT: CPT

## 2018-05-17 RX ORDER — MOXIFLOXACIN HYDROCHLORIDE TABLETS, 400 MG 400 MG/1
1 TABLET, FILM COATED ORAL
Qty: 20 | Refills: 0
Start: 2018-05-17 | End: 2018-05-26

## 2018-05-17 RX ORDER — METRONIDAZOLE 500 MG
1 TABLET ORAL
Qty: 30 | Refills: 0
Start: 2018-05-17 | End: 2018-05-26

## 2018-05-17 RX ORDER — DILTIAZEM HCL 120 MG
300 CAPSULE, EXT RELEASE 24 HR ORAL DAILY
Qty: 0 | Refills: 0 | Status: DISCONTINUED | OUTPATIENT
Start: 2018-05-17 | End: 2018-05-17

## 2018-05-17 RX ORDER — MOXIFLOXACIN HYDROCHLORIDE TABLETS, 400 MG 400 MG/1
1 TABLET, FILM COATED ORAL
Qty: 20 | Refills: 0 | OUTPATIENT
Start: 2018-05-17 | End: 2018-05-26

## 2018-05-17 RX ORDER — METRONIDAZOLE 500 MG
1 TABLET ORAL
Qty: 30 | Refills: 0 | OUTPATIENT
Start: 2018-05-17 | End: 2018-05-26

## 2018-05-17 RX ORDER — ZOLPIDEM TARTRATE 10 MG/1
1 TABLET ORAL
Qty: 0 | Refills: 0 | COMMUNITY

## 2018-05-17 RX ADMIN — Medication 25 MILLIGRAM(S): at 07:04

## 2018-05-17 RX ADMIN — PANTOPRAZOLE SODIUM 40 MILLIGRAM(S): 20 TABLET, DELAYED RELEASE ORAL at 12:04

## 2018-05-17 RX ADMIN — TIOTROPIUM BROMIDE 1 CAPSULE(S): 18 CAPSULE ORAL; RESPIRATORY (INHALATION) at 08:59

## 2018-05-17 RX ADMIN — PIPERACILLIN AND TAZOBACTAM 25 GRAM(S): 4; .5 INJECTION, POWDER, LYOPHILIZED, FOR SOLUTION INTRAVENOUS at 06:57

## 2018-05-17 RX ADMIN — APIXABAN 5 MILLIGRAM(S): 2.5 TABLET, FILM COATED ORAL at 07:00

## 2018-05-17 RX ADMIN — Medication 300 MILLIGRAM(S): at 09:46

## 2018-05-17 RX ADMIN — Medication 0.5 MILLIGRAM(S): at 08:59

## 2018-05-17 NOTE — PROGRESS NOTE ADULT - ASSESSMENT
1) Moderately Severe COPD  2) Atelectasis  3) History of Anticoagulation  4) POD 1 tolerated her surgery well  5) Emphysema    74 yo F, history of emphysema, quantified on PFT's as moderately severe  COPD, presents with appendicitis.   Has a history of 1.5ppd X 40 years, quit smoking 10 years ago, Has a history of AF/SSS, has a pacemaker and currently anticoagulated on Eliquis.  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  Has not been on O2, enrolled in pulmonary rehabilitation and completed the full course and currently goes once a week for maintenance .  Functionally, MET is >10 and patient can complete all ADL's without any difficulty.  Not in any acute distress on examination and patient states she does not have a cough/dyspnea.  Based on this information, there are no pulmonary contraindications for the patient to proceed with the planned procedure.  Agree with Cardiology consultation/assess bleeding risk, patient states last dose of Eliquis was 5/12  From a medication perspective, would continue Spiriva and Advair while inpatient  Incentive spirometry post procedure  IV Antibiotics for appendicitis  PT/OT; mobilize out of bed as tolerated  cxr repeat noted  needs PT  bronchodilator rx  need to optimize pulm status before discharge  cont inhaled steroids and bronchodilator rx  encourage incentive spirometry  needs bronchodilator rx  pulmicort respules as well  Thank you for the consult; will continue to follow.
A/P:  S/P laparascopic appendectomy for appendicitis  GI/DVT prophylaxis  Pain control  Incentive spirometry  Serial abd exams  F/U labs  Pt stable and cleared for d/c from surgical standpoint  Pt to resume eliquis anticoagulation, follow up with her cardiologist for medical management  Pt cleared for d/c by pulmonary, cardiology, hospitalist services  Pt to follow up in office in 5-7 days for drain removal  Pt aware of and agrees with all of the above
1) Moderately Severe COPD  2) Atelectasis  3) History of Anticoagulation  4) POD 1 tolerated her surgery well  5) Emphysema    74 yo F, history of emphysema, quantified on PFT's as moderately severe  COPD, presents with appendicitis.   Has a history of 1.5ppd X 40 years, quit smoking 10 years ago, Has a history of AF/SSS, has a pacemaker and currently anticoagulated on Eliquis.  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  Has not been on O2, enrolled in pulmonary rehabilitation and completed the full course and currently goes once a week for maintenance .  Functionally, MET is >10 and patient can complete all ADL's without any difficulty.  Not in any acute distress on examination and patient states she does not have a cough/dyspnea.  Based on this information, there are no pulmonary contraindications for the patient to proceed with the planned procedure.  Agree with Cardiology consultation/assess bleeding risk, patient states last dose of Eliquis was 5/12  From a medication perspective, would continue Spiriva and Advair while inpatient  Incentive spirometry post procedure  IV Antibiotics for appendicitis  PT/OT; mobilize out of bed as tolerated  will get repeat cxr today  encourage incentive spirometry  needs bronchodilator rx  pulmicort respules as well  Thank you for the consult; will continue to follow.
1) Moderately Severe COPD  2) Atelectasis  3) History of Anticoagulation  4) POD 1 tolerated her surgery well  5) Emphysema  6) A Fib with mod vent response  7) eval for hypoxemia on exertion  74 yo F, history of emphysema, quantified on PFT's as moderately severe  COPD, presents with appendicitis.   Has a history of 1.5ppd X 40 years, quit smoking 10 years ago, Has a history of AF/SSS, has a pacemaker and currently anticoagulated on Eliquis.  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  Has not been on O2, enrolled in pulmonary rehabilitation and completed the full course and currently goes once a week for maintenance .  Functionally, MET is >10 and patient can complete all ADL's without any difficulty.  Not in any acute distress on examination and patient states she does not have a cough/dyspnea.  Based on this information, there are no pulmonary contraindications for the patient to proceed with the planned procedure.  Agree with Cardiology consultation/assess bleeding risk, patient states last dose of Eliquis was 5/12  From a medication perspective, would continue Spiriva and Advair while inpatient  Incentive spirometry post procedure  IV Antibiotics for appendicitis  PT/OT; mobilize out of bed as tolerated  cxr repeat noted  needs PT  bronchodilator rx  need to optimize pulm status before discharge  cont inhaled steroids and bronchodilator rx  encourage incentive spirometry  needs bronchodilator rx  pulmicort respules as well  do walk test on RA and set up HOME O2 if qualifies / all rev with RN as well  Thank you for the consult; will continue to follow.
Acute appendicitis  Coagulopathy on eliquis anticoagulation off since evening of 5/12  Medical comorbidities of HTN, HLD, copd, atrial fibrillation, h/o breast cancer  NPO, IV hydration  IV antibiotics  GI/DVT prophylaxis  Pain control  Hospitalist consult for medical management  Cardiology consult: Optimized, Pace maker interrogated  Pulmonary consult  Incentive spirometry  Serial abd exams  F/U labs  Plan Lap appendectomy . Pt has acute appendicitis. Pt explained the surgical procedures in both medical terminology and in lay terms that the patient understood, laparascopic possible open appendectomy, possible exploratory laparotomy. Pt explained in both medical terminology and in lay terms that the patient understood, the benefits and alternatives of surgery including non-operative management. Pt explained at length in both medical terminology and in lay terms that the patient understood, the associated risks of surgery including but not limited to infection, bleeding, nerve/organ injury, postoperative pain, poor wound healing, risk of anastamotic leak or breakdown, scar formation both internally and externally, risk of seroma/hematoma/abcess formation, risk of hernia development, risk of need for further GI/IR/Surgery intervention as required. Pt understood all of the above. All questions were answered. Pt gave informed consent for surgery.
Acute appendicitis S/P laparoscopic appendectomy POD 1, wheezing, worsening asthma, on supplemental o2  Coagulopathy on eliquis anticoagulation off since evening of 5/12  Medical comorbidities of HTN, HLD, copd, atrial fibrillation, h/o breast cancer  Diet as tolerated  IV antibiotics  GI/DVT prophylaxis  Pain control  Hospitalist consult for medical management  Cardiology consult: Optimized, Pace maker interrogated  Pulmonary consult, respiratory treatments  Incentive spirometry  OOB, ambulate  Local drain care
Problem: Acute appendicitis, unspecified acute appendicitis type. Recommendation: NPO. IVF and IV Zosyn. Patient will be taken to OR tomorrow due to being on Eliquis and patient also agreed.  No  contraindication to surgery and cleared from a cardiac standpoint.     Problem/Recommendation - 2:  ·  Problem: PAF (paroxysmal atrial fibrillation).  Recommendation: Hold Eliquis. History of PAF with less than 6-7% burden on pacemaker interrogation. Hold Eliquis, and restart postop. No previous history of TIA/CVA. [id number] score 3. Patient will be on SQ heparin for DVT prophylaxis.     Problem/Recommendation - 3:  ·  Problem: Hypertension, unspecified type.  Recommendation: Continue medications with holding parameters. Monitor.     Problem/Recommendation - 4:  ·  Problem: Hyperlipidemia, unspecified hyperlipidemia type.  Recommendation: Statin with diet.     Problem/Recommendation - 5:  ·  Problem: Chronic obstructive pulmonary disease, unspecified COPD type.  Recommendation: Spriva. Will add Albuterol nebulizer as needed and prior to OR.     Problem/Recommendation - 6:  Problem: CKD (chronic kidney disease), stage II. Recommendation: Suspected old. Monitor.    Problem/Recommendation - 7:  Problem: Pancreatic abnormality. Recommendation: To follow with GI outpatient- Dr. Gómez. A copy of CT scan given to patient.    Problem/Recommendation - 8:  Problem: Hepatomegaly. Recommendation: Patient known about fatty infiltration of liver. To follow with GI outpatient- Dr. Gómez. A copy of CT scan given to patient.    Problem/Recommendation - 9:  Problem: Cholelithiasis. Recommendation: To follow with GI outpatient- Dr. Gómez.
Problem: Acute appendicitis, unspecified acute appendicitis type. Recommendation: NPO. IVF and IV Zosyn. Patient will be taken to OR tomorrow due to being on Eliquis and patient also agreed.  No  contraindication to surgery and cleared from a cardiac standpoint.     Problem/Recommendation - 2:  ·  Problem: PAF (paroxysmal atrial fibrillation).  Recommendation: Hold Eliquis. History of PAF with less than 6-7% burden on pacemaker interrogation. Hold Eliquis, and restart postop. No previous history of TIA/CVA. [id number] score 3. Patient will be on SQ heparin for DVT prophylaxis.     Problem/Recommendation - 3:  ·  Problem: Hypertension, unspecified type.  Recommendation: Continue medications with holding parameters. Monitor.     Problem/Recommendation - 4:  ·  Problem: Hyperlipidemia, unspecified hyperlipidemia type.  Recommendation: Statin with diet.     Problem/Recommendation - 5:  ·  Problem: Chronic obstructive pulmonary disease, unspecified COPD type.  Recommendation: Spriva. Will add Albuterol nebulizer as needed and prior to OR.     Problem/Recommendation - 6:  Problem: CKD (chronic kidney disease), stage II. Recommendation: Suspected old. Monitor.    Problem/Recommendation - 7:  Problem: Pancreatic abnormality. Recommendation: To follow with GI outpatient- Dr. Gómez. A copy of CT scan given to patient.    Problem/Recommendation - 8:  Problem: Hepatomegaly. Recommendation: Patient known about fatty infiltration of liver. To follow with GI outpatient- Dr. Gómez. A copy of CT scan given to patient.    Problem/Recommendation - 9:  Problem: Cholelithiasis. Recommendation: To follow with GI outpatient- Dr. Gómez.
Problem: Acute appendicitis, unspecified acute appendicitis type. Recommendation: NPO. IVF and IV Zosyn. Patient will be taken to OR tomorrow due to being on Eliquis and patient also agreed.  No  contraindication to surgery and cleared from a cardiac standpoint.     Problem/Recommendation - 2:  ·  Problem: PAF (paroxysmal atrial fibrillation).  Recommendation: Hold Eliquis. History of PAF with less than 6-7% burden on pacemaker interrogation. Hold Eliquis, and restart postop. No previous history of TIA/CVA. [id number] score 3. Patient will be on SQ heparin for DVT prophylaxis.     Problem/Recommendation - 3:  ·  Problem: Hypertension, unspecified type.  Recommendation: Continue medications with holding parameters. Monitor.     Problem/Recommendation - 4:  ·  Problem: Hyperlipidemia, unspecified hyperlipidemia type.  Recommendation: Statin with diet.     Problem/Recommendation - 5:  ·  Problem: Chronic obstructive pulmonary disease, unspecified COPD type.  Recommendation: Spriva. Will add Albuterol nebulizer as needed and prior to OR.     Problem/Recommendation - 6:  Problem: CKD (chronic kidney disease), stage II. Recommendation: Suspected old. Monitor.    Problem/Recommendation - 7:  Problem: Pancreatic abnormality. Recommendation: To follow with GI outpatient- Dr. Gómez. A copy of CT scan given to patient.    Problem/Recommendation - 8:  Problem: Hepatomegaly. Recommendation: Patient known about fatty infiltration of liver. To follow with GI outpatient- Dr. Gómez. A copy of CT scan given to patient.    Problem/Recommendation - 9:  Problem: Cholelithiasis. Recommendation: To follow with GI outpatient- Dr. Gómez.
This is a 73 year old female who came into  ed on 5-13-18 for worsening right lower quadrant pain.  Pt is scheduled for surg today for appendectomy  His of A. Fib on eliquis 5mg po twice a day for about 2years for A. Fib.  Last dose on the evening of 5-12-18.   [id number] Score: 3  5-15-18 Discussed with Dr Puri and she is fine with resuming Eliquis tomorrow.   Plan:  resume  eliquis 5mg po twice a day  dc heparin   : daily cbc/bmp  :oob as per surg.  :le VENODYNES  : will f/u
This is a 73 year old female who came into  ed on 5-13-18 for worsening right lower quadrant pain.  Pt is scheduled for surg today for appendectomy  His of A. Fib on eliquis 5mg po twice a day for about 2years for A. Fib.  Last dose on the evening of 5-12-18.   [id number] Score: 3  5-15-18 Discussed with Dr Puri and she is fine with resuming Eliquis tomorrow.   Plan:  Hold eliquis until tomorrow.  heparin 5,000 units sq q12h for today.  : daily cbc/bmp  :oob as per surg.  :le VENODYNES  : will f/u
This is a 73 year old female who came into  ed on 5-13-18 for worsening right lower quadrant pain.  Pt is scheduled for surg today for appendectomy  His of A. Fib on eliquis 5mg po twice a day for about 2years for A. Fib. resumed yesterday    [id number] Score: 3          Plan:  Cont  eliquis 5mg po twice a day    : daily cbc/bmp  :oob as per surg.  :le PRABHJOT  : will f/u poss d/c home today

## 2018-05-17 NOTE — DISCHARGE NOTE ADULT - ADDITIONAL INSTRUCTIONS
Please seek immediate medical attention for worsening abdominal pain, chest pain, shortness of breath, any adverse changes to health

## 2018-05-17 NOTE — DISCHARGE NOTE ADULT - NS AS ACTIVITY OBS
Walking-Outdoors allowed/Showering allowed/Walking-Indoors allowed/No Heavy lifting/straining/Stairs allowed/Do not make important decisions/Do not drive or operate machinery

## 2018-05-17 NOTE — DISCHARGE NOTE ADULT - CARE PROVIDERS DIRECT ADDRESSES
,DirectAddress_Unknown,[email or web address],DirectAddress_Unknown

## 2018-05-17 NOTE — PROGRESS NOTE ADULT - PROVIDER SPECIALTY LIST ADULT
Anticoag Management
Cardiology
Hospitalist
Pulmonology
Pulmonology
Surgery
Pulmonology
Surgery
Hospitalist

## 2018-05-17 NOTE — DISCHARGE NOTE ADULT - PATIENT PORTAL LINK FT
You can access the [facility], offered by [facility], by registering with the following website: [email or web address]

## 2018-05-17 NOTE — DISCHARGE NOTE ADULT - PLAN OF CARE
s/p appendectomy Please seek immediate medical attention for worsening abdominal pain, chest pain, shortness of breath, any adverse changes to health

## 2018-05-17 NOTE — DISCHARGE NOTE ADULT - MEDICATION SUMMARY - MEDICATIONS TO TAKE
I will START or STAY ON the medications listed below when I get home from the hospital:    benazepril 20 mg oral tablet  -- 1 tab(s) by mouth 2 times a day  -- Indication: For Htn    DilTIAZem Hydrochloride  mg/24 hours oral capsule, extended release  -- 1 cap(s) by mouth once a day (in the morning)  -- Indication: For Htn    dilTIAZem 60 mg oral tablet  -- 1 tab(s) by mouth once a day (at bedtime)  -- Indication: For Htn    Eliquis 5 mg oral tablet  -- 1 tab(s) by mouth 2 times a day        -- Indication: For Afib    fenofibrate 134 mg oral capsule  -- 1 cap(s) by mouth once a day  -- Indication: For Hyperlipidemia, unspecified hyperlipidemia type    simvastatin 10 mg oral tablet  -- 1 tab(s) by mouth once a day  -- Indication: For Hyperlipidemia, unspecified hyperlipidemia type    ALPRAZolam 0.5 mg oral tablet  -- 1 tab(s) by mouth once a day  -- Indication: For Anxiety    metoprolol succinate 25 mg oral tablet, extended release  -- 1 tab(s) by mouth 2 times a day  -- Indication: For Htn    ProAir HFA 90 mcg/inh inhalation aerosol  -- 2 puff(s) inhaled once a day  -- Indication: For Copd    Spiriva 18 mcg inhalation capsule  -- 1 cap(s) inhaled once a day  -- Indication: For Copd    Advair Diskus 250 mcg-50 mcg inhalation powder  -- 1 puff(s) inhaled once a day (at bedtime)  -- Indication: For Copd    CoQ10  -- 100 milligram(s) by mouth once a day  -- Indication: For nutrition    cholecalciferol 1000 intl units oral tablet  -- 1 tab(s) by mouth once a day  -- Indication: For nutrition    Vitamin B-12 500 mcg oral tablet  -- 1 tab(s) by mouth once a day  -- Indication: For nutrition

## 2018-05-17 NOTE — PROGRESS NOTE ADULT - SUBJECTIVE AND OBJECTIVE BOX
CC- abd pain    HPI:  Pt seen and examined at bedside with chaperone. Pt is AAOx3, pt in no acute distress. Pt has c/o progressively worsening right lower quadrant pain since 18. Pt denied c/o fever, chills, chest pain, SOB, N/V/D, extremity pain or dysfunction, hemoptysis, hematemesis, hematuria, hematochexia, headache, diplopia, vertigo, dizzyness. Pt tolerating diet, (+) void, (+) ambulation, (+) bowel function. Pt on eliquis anticoagulation, last dose was evening 18 (13 May 2018 18:06)    18- seen postop, doing good  Review of system- All 10 systems reviewed and is as per HPI otherwise negative.     T(C): 36.7 (18 @ 17:11), Max: 37.2 (18 @ 20:01)  HR: 71 (18 @ 17:11) (62 - 78)  BP: 121/59 (18 @ 17:11) (100/47 - 129/60)  RR: 18 (18 @ 17:11) (12 - 19)  SpO2: 95% (18 @ 17:11) (90% - 98%)  Wt(kg): --    LABS:                        14.6   8.74  )-----------( 290      ( 14 May 2018 07:45 )             43.9     143  |  112<H>  |  10  ----------------------------<  109<H>  3.8   |  22  |  1.00    Ca    8.7      14 May 2018 07:45  TPro  7.7  /  Alb  4.0  /  TBili  0.8  /  DBili  x   /  AST  26  /  ALT  24  /  AlkPhos  60  05-13  PT/INR - ( 13 May 2018 13:10 )   PT: 14.3 sec;   INR: 1.32 ratio     PTT - ( 13 May 2018 13:10 )  PTT:36.8 sec    Urinalysis Basic - ( 13 May 2018 13:05 )  Color: Yellow / Appearance: Clear / S.005 / pH: x  Gluc: x / Ketone: Negative  / Bili: Negative / Urobili: Negative mg/dL   Blood: x / Protein: Negative mg/dL / Nitrite: Negative   Leuk Esterase: Trace / RBC: 0-2 /HPF / WBC 3-5   Sq Epi: x / Non Sq Epi: Occasional / Bacteria: Few    RADIOLOGY & ADDITIONAL TESTS:    PHYSICAL EXAM:  GENERAL: NAD, well-groomed, well-developed  HEAD:  Atraumatic, Normocephalic  EYES: EOMI, PERRLA, conjunctiva and sclera clear  HEENT: Moist mucous membranes  NECK: Supple, No JVD  NERVOUS SYSTEM:  Alert & Oriented X3, Motor Strength 5/5 B/L upper and lower extremities; DTRs 2+ intact and symmetric  CHEST/LUNG: Clear to auscultation bilaterally; No rales, rhonchi, wheezing, or rubs  HEART: Regular rate and rhythm; No murmurs, rubs, or gallops  ABDOMEN: +postop, +SHE drain  GENITOURINARY- Voiding, no palpable bladder  EXTREMITIES:  2+ Peripheral Pulses, No clubbing, cyanosis, or edema  MUSCULOSKELTAL- No muscle tenderness, Muscle tone normal, No joint tenderness, no Joint swelling, Joint range of motion-normal  SKIN-no rash, no lesion  CNS- alert, oriented X3, non focal     MEDICATIONS  (STANDING):  ALBUTerol    90 MICROgram(s) HFA Inhaler 2 Puff(s) Inhalation once  diltiazem    Tablet 60 milliGRAM(s) Oral at bedtime  heparin  Injectable 5000 Unit(s) SubCutaneous every 12 hours  lisinopril 20 milliGRAM(s) Oral two times a day  metoprolol tartrate 25 milliGRAM(s) Oral two times a day  pantoprazole  Injectable 40 milliGRAM(s) IV Push daily  piperacillin/tazobactam IVPB. 3.375 Gram(s) IV Intermittent every 8 hours  simvastatin 10 milliGRAM(s) Oral at bedtime  sodium chloride 0.9%. 1000 milliLiter(s) (75 mL/Hr) IV Continuous <Continuous>  tiotropium 18 MICROgram(s) Capsule 1 Capsule(s) Inhalation daily    MEDICATIONS  (PRN):  acetaminophen   Tablet 650 milliGRAM(s) Oral every 6 hours PRN For Temp greater than 38 C (100.4 F)  HYDROmorphone  Injectable 1 milliGRAM(s) IV Push every 4 hours PRN Severe Pain  ondansetron Injectable 4 milliGRAM(s) IV Push every 6 hours PRN Nausea    Assessment/Plan  #Acute gangrenous appendicitis s/p lap appendectomy  Surg f/u DR Puri appreciated  Cont Iv Zosyn  SHE drain in place  Pain meds prn  OOB  Incentive spirometry    #Afib on Eliquis  Hold Eliquis for 48h due to extensive surgery  Heparin SQ proph for now, venodynes    #COPD- stable    #Dispo- likely home when clinically stable in 2-3 days
CC:Patient is a 73y old  Female who presents with a chief complaint of abd pain since 5/11/18 (13 May 2018 18:06)      Subjective:  Pt seen and examined at bedside with chaperone. Pt is AAOx3, pt in no acute distress. Pt states well tolerated incisional pain. Pt denied c/o fever, chills, chest pain, SOB, abd pain, N/V/D, extremity pain or dysfunction, hemoptysis, hematemesis, hematuria, hematochexia, headache, diplopia, vertigo, dizzyness. Pt tolerating diet, (+) void, (+) ambulation, (+) bowel function    ROS:  incisional pain otherwise negative ROS    Vital Signs Last 24 Hrs  T(C): 36.6 (17 May 2018 11:07), Max: 37.2 (16 May 2018 23:30)  T(F): 97.9 (17 May 2018 11:07), Max: 98.9 (16 May 2018 23:30)  HR: 94 (17 May 2018 11:07) (77 - 103)  BP: 149/71 (17 May 2018 11:07) (133/78 - 159/85)  BP(mean): --  RR: 16 (17 May 2018 11:07) (15 - 16)  SpO2: 94% (17 May 2018 11:07) (94% - 96%)    Labs:                      14.3   8.20  )-----------( 317      ( 17 May 2018 06:25 )             41.6     CBC Full  -  ( 17 May 2018 06:25 )  WBC Count : 8.20 K/uL  Hemoglobin : 14.3 g/dL  Hematocrit : 41.6 %  Platelet Count - Automated : 317 K/uL  Mean Cell Volume : 90.8 fl  Mean Cell Hemoglobin : 31.2 pg  Mean Cell Hemoglobin Concentration : 34.4 gm/dL  Auto Neutrophil # : x  Auto Lymphocyte # : x  Auto Monocyte # : x  Auto Eosinophil # : x  Auto Basophil # : x  Auto Neutrophil % : x  Auto Lymphocyte % : x  Auto Monocyte % : x  Auto Eosinophil % : x  Auto Basophil % : x    05-17    144  |  108  |  11  ----------------------------<  102<H>  4.0   |  28  |  0.96    Ca    9.3      17 May 2018 06:25  Phos  2.3     05-17              Meds:  acetaminophen   Tablet 650 milliGRAM(s) Oral every 6 hours PRN  ALBUTerol    0.083% 2.5 milliGRAM(s) Nebulizer every 6 hours PRN  apixaban 5 milliGRAM(s) Oral every 12 hours  buDESOnide   0.5 milliGRAM(s) Respule 0.5 milliGRAM(s) Inhalation two times a day  diltiazem    Tablet 60 milliGRAM(s) Oral at bedtime  diltiazem    milliGRAM(s) Oral daily  guaiFENesin    Syrup 200 milliGRAM(s) Oral every 6 hours PRN  HYDROmorphone  Injectable 1 milliGRAM(s) IV Push every 4 hours PRN  lisinopril 20 milliGRAM(s) Oral two times a day  metoprolol tartrate 25 milliGRAM(s) Oral two times a day  ondansetron Injectable 4 milliGRAM(s) IV Push every 6 hours PRN  pantoprazole  Injectable 40 milliGRAM(s) IV Push daily  piperacillin/tazobactam IVPB. 3.375 Gram(s) IV Intermittent every 8 hours  simvastatin 10 milliGRAM(s) Oral at bedtime  tiotropium 18 MICROgram(s) Capsule 1 Capsule(s) Inhalation daily      Radiology:      Physical exam:  Pt is aaox3  Pt in no acute distress  Resp: CTAB  CVS: S1S2(+)  ABD: bowel sounds (+), soft, non distended, no rebound, no guarding, no rigidity, no skin changes to exam. Incision sites clean, dry, intact, no cellulitis, no d/c, no purulence, no fluctuance. (+) appropriate incisional tenderness to exam. Drain demonstrates serous output  EXT: no calf tenderness or edema to exam b/l, on VTE prophylaxis  Skin: no adverse skin changes to exam
Patient is a 73y old  Female who presents with a chief complaint of abd pain since 18 (13 May 2018 18:06)      HPI:  Pt seen and examined at bedside with chaperone. Pt is AAOx3, pt in no acute distress. Pt has c/o progressively worsening right lower quadrant pain since 18. Pt denied c/o fever, chills, chest pain, SOB, N/V/D, extremity pain or dysfunction, hemoptysis, hematemesis, hematuria, hematochexia, headache, diplopia, vertigo, dizzyness. Pt tolerating diet, (+) void, (+) ambulation, (+) bowel function    Pt on eliquis anticoagulation, last dose was evening 18 (13 May 2018 18:06)    5/15  feels better post op  no distress  no cp  GARCIA    increased wheezing and GARCIA after returned from bathroom this am  no cp  PAST MEDICAL & SURGICAL HISTORY:  Hyperlipidemia, unspecified hyperlipidemia type  Malignant neoplasm of left female breast, unspecified estrogen receptor status, unspecified site of breast  Chronic obstructive pulmonary disease, unspecified COPD type  Hypertension, unspecified type  Afib  Pacemaker  S/P right knee arthroscopy  Artificial pacemaker  H/O mastectomy, left      PREVIOUS DIAGNOSTIC TESTING:      MEDICATIONS  (STANDING):  ALBUTerol/ipratropium for Nebulization. 3 milliLiter(s) Nebulizer once  apixaban 5 milliGRAM(s) Oral every 12 hours  buDESOnide   0.5 milliGRAM(s) Respule 0.5 milliGRAM(s) Inhalation two times a day  diltiazem    Tablet 60 milliGRAM(s) Oral at bedtime  lisinopril 20 milliGRAM(s) Oral two times a day  metoprolol tartrate 25 milliGRAM(s) Oral two times a day  pantoprazole  Injectable 40 milliGRAM(s) IV Push daily  piperacillin/tazobactam IVPB. 3.375 Gram(s) IV Intermittent every 8 hours  simvastatin 10 milliGRAM(s) Oral at bedtime  sodium chloride 0.9%. 1000 milliLiter(s) (75 mL/Hr) IV Continuous <Continuous>  tiotropium 18 MICROgram(s) Capsule 1 Capsule(s) Inhalation daily      MEDICATIONS  (PRN):  acetaminophen   Tablet 650 milliGRAM(s) Oral every 6 hours PRN For Temp greater than 38 C (100.4 F)  ALBUTerol    90 MICROgram(s) HFA Inhaler 2 Puff(s) Inhalation every 6 hours PRN Shortness of Breath and/or Wheezing  HYDROmorphone  Injectable 0.5 milliGRAM(s) IV Push every 4 hours PRN Moderate Pain  HYDROmorphone  Injectable 1 milliGRAM(s) IV Push every 4 hours PRN Severe Pain  ondansetron Injectable 4 milliGRAM(s) IV Push every 6 hours PRN Nausea      FAMILY HISTORY:  No pertinent family history in first degree relatives      SOCIAL HISTORY: Works as a nurse    REVIEW OF SYSTEM:  +Abdominal pain, denies dyspnea, otherwise 12 point ROS negativ e    Vital Signs Last 24 Hrs  T(C): 37.1 (16 May 2018 04:55), Max: 37.4 (15 May 2018 11:12)  T(F): 98.7 (16 May 2018 04:55), Max: 99.4 (15 May 2018 11:12)  HR: 84 (16 May 2018 04:55) (74 - 96)  BP: 106/52 (16 May 2018 04:55) (101/58 - 134/59)  BP(mean): --  RR: 16 (16 May 2018 04:55) (16 - 17)  SpO2: 93% (16 May 2018 04:55) (92% - 95%)          PHYSICAL EXAM  General Appearance: cooperative, no acute distress,   HEENT: PERRL, conjunctiva clear, EOM's intact, non injected pharynx, no exudate, TM   normal  Neck: Supple, , no adenopathy, thyroid: not enlarged, no carotid bruit or JVD  Back: Symmetric, no  tenderness,no soft tissue tenderness  Lungs: Clear to auscultation bilaterally  Heart: +S1,S2  Abdomen: Soft, non-tender, bowel sounds active , no hepatosplenomegaly  Extremities: no cyanosis or edema, no joint swelling  Skin: Skin color, texture normal, no rashes   Neurologic: Alert and oriented X3 , cranial nerves intact, sensory and motor normal,    ECG:    cxr    < from: Xray Chest 1 View- PORTABLE-Routine (05.15.18 @ 11:31) >  PROCEDURE DATE:  05/15/2018          INTERPRETATION:  History: Postop    Portable AP radiograph of the chest is performed. comparison is made to   2018.    The cardiomediastinal silhouette is normal. dual-lead pacemaker is again   seen entering from the left. Left axillary clips are again noted. Mild   elevation of the right diaphragm is again seen. The lungs are clear. The   osseous structures are unremarkable.    Impression: Pacemaker. No active pulmonary disease. No change.    < end of copied text >      LABS:                          14.6   8.74  )-----------( 290      ( 14 May 2018 07:45 )             43.9     05-    138  |  107  |  15  ----------------------------<  99  4.5   |  23  |  1.21    Ca    9.3      13 May 2018 12:22    TPro  7.7  /  Alb  4.0  /  TBili  0.8  /  DBili  x   /  AST  26  /  ALT  24  /  AlkPhos  60  05-13            PT/INR - ( 13 May 2018 13:10 )   PT: 14.3 sec;   INR: 1.32 ratio         PTT - ( 13 May 2018 13:10 )  PTT:36.8 sec  Urinalysis Basic - ( 13 May 2018 13:05 )    Color: Yellow / Appearance: Clear / S.005 / pH: x  Gluc: x / Ketone: Negative  / Bili: Negative / Urobili: Negative mg/dL   Blood: x / Protein: Negative mg/dL / Nitrite: Negative   Leuk Esterase: Trace / RBC: 0-2 /HPF / WBC 3-5   Sq Epi: x / Non Sq Epi: Occasional / Bacteria: Few            RADIOLOGY & ADDITIONAL STUDIES:    Impression    Prominence of the appendix with surrounding soft tissue infiltration most   consistent with appendicitis in the correct clinical context. No   appendicolith, collection or evidence of perforation.    Solitary subcentimeter gallbladder calculus with no other acute findings    Slight prominence of the pancreatic head with areas of decreased   attenuation most consistent with fatty infiltration, no gross mass.    Hepatomegaly with diffuse fatty infiltration, no focal mass or   intrahepatic biliary ductal dilatation.    CXR  Left cardiac device and surgical clips.    Atelectasis lung bases. No consolidation or pleural effusion.    Heart size cannot be accurately assessed in this projection.
CC- abd pain    HPI:  72yo/F with PMH PAF s/p PPM on Eliquis, COPD not on home O2, presented with acute abd pain, diagnosed with acute appendicitis. Medical consult called for management of medical comorbidities perioperatively    5/14/18- seen postop, doing good  5/15/18 mild SOB and wheezing, +some abd distention    Review of system- All 10 systems reviewed and is as per HPI otherwise negative.     Vital Signs Last 24 Hrs  T(C): 37.4 (15 May 2018 11:12), Max: 37.4 (15 May 2018 11:12)  T(F): 99.4 (15 May 2018 11:12), Max: 99.4 (15 May 2018 11:12)  HR: 84 (15 May 2018 11:12) (65 - 84)  BP: 134/59 (15 May 2018 11:12) (100/47 - 153/62)  BP(mean): --  RR: 17 (15 May 2018 11:12) (12 - 19)  SpO2: 92% (15 May 2018 11:12) (90% - 97%)    LABS:                        12.7   9.58  )-----------( 251      ( 15 May 2018 06:00 )             38.4     140    |  109    |  9      ----------------------------<  104    4.4     |  24     |  1.02     Ca    8.8        15 May 2018 06:00    RADIOLOGY & ADDITIONAL TESTS:    PHYSICAL EXAM:  GENERAL: NAD, well-groomed, well-developed  HEAD:  Atraumatic, Normocephalic  EYES: EOMI, PERRLA, conjunctiva and sclera clear  HEENT: Moist mucous membranes  NECK: Supple, No JVD  NERVOUS SYSTEM:  Alert & Oriented X3, Motor Strength 5/5 B/L upper and lower extremities; DTRs 2+ intact and symmetric  CHEST/LUNG: Clear to auscultation bilaterally; No rales, rhonchi, wheezing, or rubs  HEART: Regular rate and rhythm; No murmurs, rubs, or gallops  ABDOMEN: +postop, distended, +SHE drain, +hypoactive BS  GENITOURINARY- Voiding, no palpable bladder  EXTREMITIES:  2+ Peripheral Pulses, No clubbing, cyanosis, or edema  MUSCULOSKELTAL- No muscle tenderness, Muscle tone normal, No joint tenderness, no Joint swelling, Joint range of motion-normal  SKIN-no rash, no lesion  CNS- alert, oriented X3, non focal     MEDICATIONS  (STANDING):  ALBUTerol    90 MICROgram(s) HFA Inhaler 2 Puff(s) Inhalation once  buDESOnide   0.5 milliGRAM(s) Respule 0.5 milliGRAM(s) Inhalation two times a day  diltiazem    Tablet 60 milliGRAM(s) Oral at bedtime  heparin  Injectable 5000 Unit(s) SubCutaneous once  lisinopril 20 milliGRAM(s) Oral two times a day  metoprolol tartrate 25 milliGRAM(s) Oral two times a day  pantoprazole  Injectable 40 milliGRAM(s) IV Push daily  piperacillin/tazobactam IVPB. 3.375 Gram(s) IV Intermittent every 8 hours  simvastatin 10 milliGRAM(s) Oral at bedtime  sodium chloride 0.9%. 1000 milliLiter(s) (75 mL/Hr) IV Continuous <Continuous>  tiotropium 18 MICROgram(s) Capsule 1 Capsule(s) Inhalation daily    MEDICATIONS  (PRN):  acetaminophen   Tablet 650 milliGRAM(s) Oral every 6 hours PRN For Temp greater than 38 C (100.4 F)  ALBUTerol    0.083% 2.5 milliGRAM(s) Nebulizer every 6 hours PRN Shortness of Breath and/or Wheezing  guaiFENesin    Syrup 200 milliGRAM(s) Oral every 6 hours PRN Cough  HYDROmorphone  Injectable 1 milliGRAM(s) IV Push every 4 hours PRN Severe Pain  ondansetron Injectable 4 milliGRAM(s) IV Push every 6 hours PRN Nausea    Assessment/Plan  #Acute gangrenous appendicitis s/p lap appendectomy  Surg f/u DR Puri appreciated  Cont Iv Zosyn  SHE drain in place  Pain meds prn  OOB with PT  Incentive spirometry  Abd is distended- will watch for postop ileus    #Afib on Eliquis/ s/p PPM placement  Hold Eliquis for 48h due to extensive surgery  Heparin SQ proph for now, venodynes  Hopefully restart Eliquis tomorrow if OK with surgeon    #COPD mild exacerbation  Pulm f/u DR Justin appreciated  Cont Spiriva, Advair  No need for steroids as yet  O2 prn    #Dispo- observe for postop ileus
CC- abd pain    HPI:  72yo/F with PMH PAF s/p PPM on Eliquis, COPD not on home O2, presented with acute abd pain, diagnosed with acute appendicitis. Medical consult called for management of medical comorbidities perioperatively    5/14/18- seen postop, doing good  5/15/18 mild SOB and wheezing, +some abd distention  5/16/18 +flatus, +BM, feels less distended today    Review of system- All 10 systems reviewed and is as per HPI otherwise negative.     Vital Signs Last 24 Hrs  T(C): 36.9 (16 May 2018 10:52), Max: 37.1 (15 May 2018 17:21)  T(F): 98.5 (16 May 2018 10:52), Max: 98.8 (16 May 2018 00:00)  HR: 92 (16 May 2018 10:52) (74 - 96)  BP: 131/65 (16 May 2018 10:52) (101/58 - 131/65)  BP(mean): --  RR: 16 (16 May 2018 10:52) (16 - 16)  SpO2: 90% (16 May 2018 13:48) (90% - 95%)    LABS:                                 12.5   7.73  )-----------( 255      ( 16 May 2018 05:55 )             37.4     144    |  112    |  8      ----------------------------<  105    3.6     |  24     |  0.82   Ca    8.6        16 May 2018 05:55    RADIOLOGY & ADDITIONAL TESTS:    PHYSICAL EXAM:  GENERAL: NAD, well-groomed, well-developed  HEAD:  Atraumatic, Normocephalic  EYES: EOMI, PERRLA, conjunctiva and sclera clear  HEENT: Moist mucous membranes  NECK: Supple, No JVD  NERVOUS SYSTEM:  Alert & Oriented X3, Motor Strength 5/5 B/L upper and lower extremities; DTRs 2+ intact and symmetric  CHEST/LUNG: Occasional rhonchi and wheezes  HEART: Regular rate and rhythm; No murmurs, rubs, or gallops  ABDOMEN: +postop, soft, less distended, +SHE drain, +BS normal  GENITOURINARY- Voiding, no palpable bladder  EXTREMITIES:  2+ Peripheral Pulses, No clubbing, cyanosis, or edema  MUSCULOSKELTAL- No muscle tenderness, Muscle tone normal, No joint tenderness, no Joint swelling, Joint range of motion-normal  SKIN-no rash, no lesion  CNS- alert, oriented X3, non focal     MEDICATIONS  (STANDING):  apixaban 5 milliGRAM(s) Oral every 12 hours  buDESOnide   0.5 milliGRAM(s) Respule 0.5 milliGRAM(s) Inhalation two times a day  diltiazem    Tablet 60 milliGRAM(s) Oral at bedtime  lisinopril 20 milliGRAM(s) Oral two times a day  metoprolol tartrate 25 milliGRAM(s) Oral two times a day  pantoprazole  Injectable 40 milliGRAM(s) IV Push daily  piperacillin/tazobactam IVPB. 3.375 Gram(s) IV Intermittent every 8 hours  simvastatin 10 milliGRAM(s) Oral at bedtime  sodium chloride 0.9%. 1000 milliLiter(s) (75 mL/Hr) IV Continuous <Continuous>  tiotropium 18 MICROgram(s) Capsule 1 Capsule(s) Inhalation daily    MEDICATIONS  (PRN):  acetaminophen   Tablet 650 milliGRAM(s) Oral every 6 hours PRN For Temp greater than 38 C (100.4 F)  ALBUTerol    0.083% 2.5 milliGRAM(s) Nebulizer every 6 hours PRN Shortness of Breath and/or Wheezing  guaiFENesin    Syrup 200 milliGRAM(s) Oral every 6 hours PRN Cough  HYDROmorphone  Injectable 1 milliGRAM(s) IV Push every 4 hours PRN Severe Pain  ondansetron Injectable 4 milliGRAM(s) IV Push every 6 hours PRN Nausea    Assessment/Plan  #Acute gangrenous appendicitis s/p lap appendectomy  Surg f/u DR Puri appreciated  Cont Iv Zosyn  SHE drain in place  HH stable  Pain meds prn  OOB with PT  Incentive spirometry  Clinically abdomen looks better but still draining a lot in SHE drain    #Afib on Eliquis/ s/p PPM placement  Back on Eliquis    #COPD mild exacerbation  Pulm f/u DR Justin appreciated  Cont Spiriva, Advair  No need for steroids as yet  O2 prn    #Dispo- OOB to ambulate.
CC- abd pain    HPI:  74yo/F with PMH PAF s/p PPM on Eliquis, COPD not on home O2, presented with acute abd pain, diagnosed with acute appendicitis. Medical consult called for management of medical comorbidities perioperatively    5/14/18- seen postop, doing good  5/15/18 mild SOB and wheezing, +some abd distention  5/16/18 +flatus, +BM, feels less distended today  5/17/18 doing good, less SOB. Ambulating. Drain still with significant amount    Review of system- All 10 systems reviewed and is as per HPI otherwise negative.     Vital Signs Last 24 Hrs  T(C): 36.6 (17 May 2018 11:07), Max: 37.2 (16 May 2018 23:30)  T(F): 97.9 (17 May 2018 11:07), Max: 98.9 (16 May 2018 23:30)  HR: 94 (17 May 2018 11:07) (77 - 103)  BP: 149/71 (17 May 2018 11:07) (133/78 - 159/85)  BP(mean): --  RR: 16 (17 May 2018 11:07) (15 - 16)  SpO2: 94% (17 May 2018 11:07) (90% - 96%)    LABS:                                         14.3   8.20  )-----------( 317      ( 17 May 2018 06:25 )             41.6     144    |  108    |  11     ----------------------------<  102    4.0     |  28     |  0.96     Ca    9.3        17 May 2018 06:25  Phos  2.3       17 May 2018 06:25    RADIOLOGY & ADDITIONAL TESTS:    PHYSICAL EXAM:  GENERAL: NAD, well-groomed, well-developed  HEAD:  Atraumatic, Normocephalic  EYES: EOMI, PERRLA, conjunctiva and sclera clear  HEENT: Moist mucous membranes  NECK: Supple, No JVD  NERVOUS SYSTEM:  Alert & Oriented X3, Motor Strength 5/5 B/L upper and lower extremities; DTRs 2+ intact and symmetric  CHEST/LUNG: Occasional rhonchi and wheezes  HEART: Regular rate and rhythm; No murmurs, rubs, or gallops  ABDOMEN: +postop, soft, less distended, +SHE drain, +BS normal  GENITOURINARY- Voiding, no palpable bladder  EXTREMITIES:  2+ Peripheral Pulses, No clubbing, cyanosis, or edema  MUSCULOSKELTAL- No muscle tenderness, Muscle tone normal, No joint tenderness, no Joint swelling, Joint range of motion-normal  SKIN-no rash, no lesion  CNS- alert, oriented X3, non focal     MEDICATIONS  (STANDING):  apixaban 5 milliGRAM(s) Oral every 12 hours  buDESOnide   0.5 milliGRAM(s) Respule 0.5 milliGRAM(s) Inhalation two times a day  diltiazem    Tablet 60 milliGRAM(s) Oral at bedtime  diltiazem    milliGRAM(s) Oral daily  lisinopril 20 milliGRAM(s) Oral two times a day  metoprolol tartrate 25 milliGRAM(s) Oral two times a day  pantoprazole  Injectable 40 milliGRAM(s) IV Push daily  piperacillin/tazobactam IVPB. 3.375 Gram(s) IV Intermittent every 8 hours  simvastatin 10 milliGRAM(s) Oral at bedtime  tiotropium 18 MICROgram(s) Capsule 1 Capsule(s) Inhalation daily    MEDICATIONS  (PRN):  acetaminophen   Tablet 650 milliGRAM(s) Oral every 6 hours PRN For Temp greater than 38 C (100.4 F)  ALBUTerol    0.083% 2.5 milliGRAM(s) Nebulizer every 6 hours PRN Shortness of Breath and/or Wheezing  guaiFENesin    Syrup 200 milliGRAM(s) Oral every 6 hours PRN Cough  HYDROmorphone  Injectable 1 milliGRAM(s) IV Push every 4 hours PRN Severe Pain  ondansetron Injectable 4 milliGRAM(s) IV Push every 6 hours PRN Nausea    Assessment/Plan  #Acute gangrenous appendicitis s/p lap appendectomy  Surg f/u DR Puri appreciated  Cont Iv Zosyn  SHE drain in place- drain care as per surgery  HH stable  Pain meds prn  OOB with PT  Incentive spirometry  Clinically abdomen looks better but still draining a lot in SHE drain    #Afib on Eliquis/ s/p PPM placement  Back on Eliquis  Was off Cardizem now back on it    #COPD mild exacerbation  Pulm f/u DR Justin appreciated  Cont Spiriva, Advair  No need for steroids as yet  O2 prn    #Dispo- feels better and wants to go home. Drain as per surgeon. Medically stable for discharge if ?can go with drain
CHIEF COMPLAINT:    HPI:  Pt seen and examined at bedside with chaperone. Pt is AAOx3, pt in no acute distress. Pt has c/o progressively worsening right lower quadrant pain since 5/11/18. Pt denied c/o fever, chills, chest pain, SOB, N/V/D, extremity pain or dysfunction, hemoptysis, hematemesis, hematuria, hematochexia, headache, diplopia, vertigo, dizzyness. Pt tolerating diet, (+) void, (+) ambulation, (+) bowel function    Pt on eliquis anticoagulation, last dose was evening 5/12/18     EKG on admission showed atrial pacing rate 61. No AF.    5/15: S/P lap appendectomy. PM interrogation showed about 17% AF. Pt in sinus rhythm on exam. Would restart Eliquis tomorrow if ok with Surgery. Cardiac status stable postop.      PAST MEDICAL & SURGICAL HISTORY:  Hyperlipidemia, unspecified hyperlipidemia type  Malignant neoplasm of left female breast, unspecified estrogen receptor status, unspecified site of breast  Chronic obstructive pulmonary disease, unspecified COPD type  Hypertension, unspecified type  Afib  Pacemaker  S/P right knee arthroscopy  Artificial pacemaker  H/O mastectomy, left      Allergies    No Known Allergies    Intolerances    Mobic (Other)      Occupation:  AlXceediumol: Denied  Smoking: Denied  Drug Use: Denied  Marital Status:         FAMILY HISTORY:  No pertinent family history in first degree relatives      REVIEW OF SYSTEMS:    CONSTITUTIONAL: No weakness, fevers or chills  EYES/ENT: No visual changes;  No vertigo or throat pain   NECK: No pain or stiffness  RESPIRATORY: No cough, wheezing, hemoptysis; No shortness of breath  CARDIOVASCULAR: No chest pain or palpitations  GASTROINTESTINAL: No abdominal or epigastric pain. No nausea, vomiting, or hematemesis; No diarrhea or constipation. No melena or hematochezia.  GENITOURINARY: No dysuria, frequency or hematuria  NEUROLOGICAL: No numbness or weakness  SKIN: No itching, burning, rashes, or lesions   All other review of systems is negative unless indicated above    Vital Signs Last 24 Hrs  T(C): 36.9 (13 May 2018 20:31), Max: 37.2 (13 May 2018 15:49)  T(F): 98.5 (13 May 2018 20:31), Max: 99 (13 May 2018 15:49)  HR: 78 (14 May 2018 06:02) (62 - 78)  BP: 119/62 (14 May 2018 06:02) (112/66 - 129/60)  BP(mean): --  RR: 18 (13 May 2018 20:31) (16 - 18)  SpO2: 96% (13 May 2018 20:31) (95% - 97%)    I&O's Summary    13 May 2018 07:01  -  14 May 2018 07:00  --------------------------------------------------------  IN: 1090 mL / OUT: 0 mL / NET: 1090 mL        PHYSICAL EXAM:    Constitutional: NAD, awake and alert, well-developed  HEENT: PERR, EOMI,  No oral cyananosis.  Neck:  supple,  No JVD  Respiratory: Breath sounds are clear bilaterally, No wheezing, rales or rhonchi  Cardiovascular: S1 and S2, regular rate and rhythm, no Murmurs, gallops or rubs  Gastrointestinal: Bowel Sounds present, soft, nontender.   Extremities: No peripheral edema. No clubbing or cyanosis.  Vascular: 2+ peripheral pulses  Neurological: A/O x 3, no focal deficits  Musculoskeletal: no calf tenderness.  Skin: No rashes.    MEDICATIONS:  MEDICATIONS  (STANDING):  ALBUTerol    90 MICROgram(s) HFA Inhaler 2 Puff(s) Inhalation once  diltiazem    Tablet 60 milliGRAM(s) Oral at bedtime  diltiazem    milliGRAM(s) Oral daily  heparin  Injectable 5000 Unit(s) SubCutaneous every 12 hours  lisinopril 20 milliGRAM(s) Oral two times a day  metoprolol tartrate 25 milliGRAM(s) Oral two times a day  pantoprazole  Injectable 40 milliGRAM(s) IV Push daily  piperacillin/tazobactam IVPB. 3.375 Gram(s) IV Intermittent every 8 hours  simvastatin 10 milliGRAM(s) Oral at bedtime  sodium chloride 0.9%. 1000 milliLiter(s) (100 mL/Hr) IV Continuous <Continuous>  tiotropium 18 MICROgram(s) Capsule 1 Capsule(s) Inhalation daily      LABS: All Labs Reviewed:                        14.6   8.74  )-----------( 290      ( 14 May 2018 07:45 )             43.9                         15.5   11.02 )-----------( 281      ( 13 May 2018 12:22 )             45.9     14 May 2018 07:45    143    |  112    |  10     ----------------------------<  109    3.8     |  22     |  1.00   13 May 2018 12:22    138    |  107    |  15     ----------------------------<  99     4.5     |  23     |  1.21     Ca    8.7        14 May 2018 07:45  Ca    9.3        13 May 2018 12:22    TPro  7.7    /  Alb  4.0    /  TBili  0.8    /  DBili  x      /  AST  26     /  ALT  24     /  AlkPhos  60     13 May 2018 12:22    PT/INR - ( 13 May 2018 13:10 )   PT: 14.3 sec;   INR: 1.32 ratio         PTT - ( 13 May 2018 13:10 )  PTT:36.8 sec      Blood Culture:         RADIOLOGY/EKG:
CHIEF COMPLAINT:    HPI:  Pt seen and examined at bedside with chaperone. Pt is AAOx3, pt in no acute distress. Pt has c/o progressively worsening right lower quadrant pain since 5/11/18. Pt denied c/o fever, chills, chest pain, SOB, N/V/D, extremity pain or dysfunction, hemoptysis, hematemesis, hematuria, hematochexia, headache, diplopia, vertigo, dizzyness. Pt tolerating diet, (+) void, (+) ambulation, (+) bowel function    Pt on eliquis anticoagulation, last dose was evening 5/12/18     EKG on admission showed atrial pacing rate 61. No AF.    5/15: S/P lap appendectomy. PM interrogation showed about 17% AF. Pt in sinus rhythm on exam. Would restart Eliquis tomorrow if ok with Surgery. Cardiac status stable postop.    5/16: Eliquis restarted this am. On exam pt in sinus rhythm, rate 80 . Cardizem CD am dose D/Willie. Would restart when BP stable.      PAST MEDICAL & SURGICAL HISTORY:  Hyperlipidemia, unspecified hyperlipidemia type  Malignant neoplasm of left female breast, unspecified estrogen receptor status, unspecified site of breast  Chronic obstructive pulmonary disease, unspecified COPD type  Hypertension, unspecified type  Afib  Pacemaker  S/P right knee arthroscopy  Artificial pacemaker  H/O mastectomy, left      Allergies    No Known Allergies    Intolerances    Mobic (Other)      Occupation:  Miraculinsol: Denied  Smoking: Denied  Drug Use: Denied  Marital Status:         FAMILY HISTORY:  No pertinent family history in first degree relatives      REVIEW OF SYSTEMS:    CONSTITUTIONAL: No weakness, fevers or chills  EYES/ENT: No visual changes;  No vertigo or throat pain   NECK: No pain or stiffness  RESPIRATORY: No cough, wheezing, hemoptysis; No shortness of breath  CARDIOVASCULAR: No chest pain or palpitations  GASTROINTESTINAL: No abdominal or epigastric pain. No nausea, vomiting, or hematemesis; No diarrhea or constipation. No melena or hematochezia.  GENITOURINARY: No dysuria, frequency or hematuria  NEUROLOGICAL: No numbness or weakness  SKIN: No itching, burning, rashes, or lesions   All other review of systems is negative unless indicated above    Vital Signs Last 24 Hrs  T(C): 36.9 (13 May 2018 20:31), Max: 37.2 (13 May 2018 15:49)  T(F): 98.5 (13 May 2018 20:31), Max: 99 (13 May 2018 15:49)  HR: 78 (14 May 2018 06:02) (62 - 78)  BP: 119/62 (14 May 2018 06:02) (112/66 - 129/60)  BP(mean): --  RR: 18 (13 May 2018 20:31) (16 - 18)  SpO2: 96% (13 May 2018 20:31) (95% - 97%)    I&O's Summary    13 May 2018 07:01  -  14 May 2018 07:00  --------------------------------------------------------  IN: 1090 mL / OUT: 0 mL / NET: 1090 mL        PHYSICAL EXAM:    Constitutional: NAD, awake and alert, well-developed  HEENT: PERR, EOMI,  No oral cyananosis.  Neck:  supple,  No JVD  Respiratory: Breath sounds are clear bilaterally, No wheezing, rales or rhonchi  Cardiovascular: S1 and S2, regular rate and rhythm, no Murmurs, gallops or rubs  Gastrointestinal: Bowel Sounds present, soft, nontender.   Extremities: No peripheral edema. No clubbing or cyanosis.  Vascular: 2+ peripheral pulses  Neurological: A/O x 3, no focal deficits  Musculoskeletal: no calf tenderness.  Skin: No rashes.    MEDICATIONS:  MEDICATIONS  (STANDING):  ALBUTerol    90 MICROgram(s) HFA Inhaler 2 Puff(s) Inhalation once  diltiazem    Tablet 60 milliGRAM(s) Oral at bedtime  diltiazem    milliGRAM(s) Oral daily  heparin  Injectable 5000 Unit(s) SubCutaneous every 12 hours  lisinopril 20 milliGRAM(s) Oral two times a day  metoprolol tartrate 25 milliGRAM(s) Oral two times a day  pantoprazole  Injectable 40 milliGRAM(s) IV Push daily  piperacillin/tazobactam IVPB. 3.375 Gram(s) IV Intermittent every 8 hours  simvastatin 10 milliGRAM(s) Oral at bedtime  sodium chloride 0.9%. 1000 milliLiter(s) (100 mL/Hr) IV Continuous <Continuous>  tiotropium 18 MICROgram(s) Capsule 1 Capsule(s) Inhalation daily      LABS: All Labs Reviewed:                        14.6   8.74  )-----------( 290      ( 14 May 2018 07:45 )             43.9                         15.5   11.02 )-----------( 281      ( 13 May 2018 12:22 )             45.9     14 May 2018 07:45    143    |  112    |  10     ----------------------------<  109    3.8     |  22     |  1.00   13 May 2018 12:22    138    |  107    |  15     ----------------------------<  99     4.5     |  23     |  1.21     Ca    8.7        14 May 2018 07:45  Ca    9.3        13 May 2018 12:22    TPro  7.7    /  Alb  4.0    /  TBili  0.8    /  DBili  x      /  AST  26     /  ALT  24     /  AlkPhos  60     13 May 2018 12:22    PT/INR - ( 13 May 2018 13:10 )   PT: 14.3 sec;   INR: 1.32 ratio         PTT - ( 13 May 2018 13:10 )  PTT:36.8 sec      Blood Culture:         RADIOLOGY/EKG:
CHIEF COMPLAINT:    HPI:  Pt seen and examined at bedside with chaperone. Pt is AAOx3, pt in no acute distress. Pt has c/o progressively worsening right lower quadrant pain since 5/11/18. Pt denied c/o fever, chills, chest pain, SOB, N/V/D, extremity pain or dysfunction, hemoptysis, hematemesis, hematuria, hematochexia, headache, diplopia, vertigo, dizzyness. Pt tolerating diet, (+) void, (+) ambulation, (+) bowel function    Pt on eliquis anticoagulation, last dose was evening 5/12/18     EKG on admission showed atrial pacing rate 61. No AF.    5/15: S/P lap appendectomy. PM interrogation showed about 17% AF. Pt in sinus rhythm on exam. Would restart Eliquis tomorrow if ok with Surgery. Cardiac status stable postop.    5/16: Eliquis restarted this am. On exam pt in sinus rhythm, rate 80 . Cardizem CD am dose D/Willie. Would restart when BP stable.    5/17: pt thinks she is in AF. No chest pain, but some increase in dyspnea. EKG now shows atrial flutter/fib with controlled VR of 77. /78. Pt has not been getting her Cardizem  mg qam, only 60 mg qhs and her BB and ACE. Hold lisinopril this am and give Cardizem  mg. I spoke to RN and to pt. She has had PAF. PM interrogation showed about 17% AF as stated above.      PAST MEDICAL & SURGICAL HISTORY:  Hyperlipidemia, unspecified hyperlipidemia type  Malignant neoplasm of left female breast, unspecified estrogen receptor status, unspecified site of breast  Chronic obstructive pulmonary disease, unspecified COPD type  Hypertension, unspecified type  Afib  Pacemaker  S/P right knee arthroscopy  Artificial pacemaker  H/O mastectomy, left      Allergies    No Known Allergies    Intolerances    Mobic (Other)      Occupation:  Alochol: Denied  Smoking: Denied  Drug Use: Denied  Marital Status:         FAMILY HISTORY:  No pertinent family history in first degree relatives      REVIEW OF SYSTEMS:    CONSTITUTIONAL: No weakness, fevers or chills  EYES/ENT: No visual changes;  No vertigo or throat pain   NECK: No pain or stiffness  RESPIRATORY: No cough, wheezing, hemoptysis; No shortness of breath  CARDIOVASCULAR: No chest pain or palpitations  GASTROINTESTINAL: No abdominal or epigastric pain. No nausea, vomiting, or hematemesis; No diarrhea or constipation. No melena or hematochezia.  GENITOURINARY: No dysuria, frequency or hematuria  NEUROLOGICAL: No numbness or weakness  SKIN: No itching, burning, rashes, or lesions   All other review of systems is negative unless indicated above    Vital Signs Last 24 Hrs  T(C): 36.9 (13 May 2018 20:31), Max: 37.2 (13 May 2018 15:49)  T(F): 98.5 (13 May 2018 20:31), Max: 99 (13 May 2018 15:49)  HR: 78 (14 May 2018 06:02) (62 - 78)  BP: 119/62 (14 May 2018 06:02) (112/66 - 129/60)  BP(mean): --  RR: 18 (13 May 2018 20:31) (16 - 18)  SpO2: 96% (13 May 2018 20:31) (95% - 97%)    I&O's Summary    13 May 2018 07:01  -  14 May 2018 07:00  --------------------------------------------------------  IN: 1090 mL / OUT: 0 mL / NET: 1090 mL        PHYSICAL EXAM:    Constitutional: NAD, awake and alert, well-developed  HEENT: PERR, EOMI,  No oral cyananosis.  Neck:  supple,  No JVD  Respiratory: Breath sounds are clear bilaterally, No wheezing, rales or rhonchi  Cardiovascular: S1 and S2, regular rate and rhythm, no Murmurs, gallops or rubs  Gastrointestinal: Bowel Sounds present, soft, nontender.   Extremities: No peripheral edema. No clubbing or cyanosis.  Vascular: 2+ peripheral pulses  Neurological: A/O x 3, no focal deficits  Musculoskeletal: no calf tenderness.  Skin: No rashes.    MEDICATIONS:  MEDICATIONS  (STANDING):  ALBUTerol    90 MICROgram(s) HFA Inhaler 2 Puff(s) Inhalation once  diltiazem    Tablet 60 milliGRAM(s) Oral at bedtime  diltiazem    milliGRAM(s) Oral daily  heparin  Injectable 5000 Unit(s) SubCutaneous every 12 hours  lisinopril 20 milliGRAM(s) Oral two times a day  metoprolol tartrate 25 milliGRAM(s) Oral two times a day  pantoprazole  Injectable 40 milliGRAM(s) IV Push daily  piperacillin/tazobactam IVPB. 3.375 Gram(s) IV Intermittent every 8 hours  simvastatin 10 milliGRAM(s) Oral at bedtime  sodium chloride 0.9%. 1000 milliLiter(s) (100 mL/Hr) IV Continuous <Continuous>  tiotropium 18 MICROgram(s) Capsule 1 Capsule(s) Inhalation daily      LABS: All Labs Reviewed:                        14.6   8.74  )-----------( 290      ( 14 May 2018 07:45 )             43.9                         15.5   11.02 )-----------( 281      ( 13 May 2018 12:22 )             45.9     14 May 2018 07:45    143    |  112    |  10     ----------------------------<  109    3.8     |  22     |  1.00   13 May 2018 12:22    138    |  107    |  15     ----------------------------<  99     4.5     |  23     |  1.21     Ca    8.7        14 May 2018 07:45  Ca    9.3        13 May 2018 12:22    TPro  7.7    /  Alb  4.0    /  TBili  0.8    /  DBili  x      /  AST  26     /  ALT  24     /  AlkPhos  60     13 May 2018 12:22    PT/INR - ( 13 May 2018 13:10 )   PT: 14.3 sec;   INR: 1.32 ratio         PTT - ( 13 May 2018 13:10 )  PTT:36.8 sec      Blood Culture:         RADIOLOGY/EKG:
HPI:  Pt seen and examined at bedside  S/P Emergent AP. Pt on eliquis anticoagulation    Patient is a 73y old  Female who presents with a chief complaint of abd pain since 5/11/18 (13 May 2018 18:06)      Consulted by Dr. Dr. Linda Toscano  for VTE prophylaxis, risk stratification, and anticoagulation management.    PAST MEDICAL & SURGICAL HISTORY:  Hyperlipidemia, unspecified hyperlipidemia type  Malignant neoplasm of left female breast, unspecified estrogen receptor status, unspecified site of breast  Chronic obstructive pulmonary disease, unspecified COPD type  Hypertension, unspecified type  Afib  Pacemaker  S/P right knee arthroscopy  Artificial pacemaker  H/O mastectomy, left    Caprini VTE Risk Score: 8    [id number] Score: 3    IMPROVE Bleeding Risk Score: 2.5    Falls Risk:   High (  )  Mod (  )  Low (x  )    EBL:   ml  crcrl: 51.9    5-14-18 Pt seen at bedside this a.m. before planned appendectomy.  discussed her anticoagulation with Eliquis and she has taken her last dose on 5-12 p.m. dose. Has been on it for about 2 years.  Informed her we will resume her Eliquis after surgery as long as it is safe.  questions answered will reinforce as needed.   5-15-18 Pt seen at bedside.  s/p appendectomy yesterday.  States she feels better. Informed her she is on heparin sq for today and will start her Eliquis tomorrow.  5-16-18 Pt seen at bedside on 2N.  states she feels better today.  Resumed her Eliquis today.  pt has no concerns.   5/17: seen at bedside, without c/o       Vital Signs Last 24 Hrs  T(C): 36.6 (05-17-18 @ 11:07), Max: 37.2 (05-16-18 @ 23:30)  T(F): 97.9 (05-17-18 @ 11:07), Max: 98.9 (05-16-18 @ 23:30)  HR: 94 (05-17-18 @ 11:07) (77 - 103)  BP: 149/71 (05-17-18 @ 11:07) (133/78 - 159/85)  BP(mean): --  RR: 16 (05-17-18 @ 11:07) (15 - 16)  SpO2: 94% (05-17-18 @ 11:07) (90% - 96%)      FAMILY HISTORY:  No pertinent family history in first degree relatives    Denies any personal or familial history of clotting or bleeding disorders.    Allergies    No Known Allergies    Intolerances    Mobic (Other)      REVIEW OF SYSTEMS    (  )Fever	     (  )Constipation	(  )SOB				(  )Headache	(  )Dysuria  (  )Chills	     (  )Melena	(  )Dyspnea present on exertion	                    (  )Dizziness                    (  )Polyuria  (  )Nausea	     (  )Hematochezia	(  )Cough			                    (  )Syncope   	(  )Hematuria  (  )Vomiting    (  )Chest Pain	(  )Wheezing			(  )Weakness  (  )Diarrhea     (  )Palpitations	(  )Anorexia			(  )Myalgia    All other review of systems negative: Yes      PHYSICAL EXAM:    Constitutional: Appears Well    Neurological: A& O x 3    Skin: Warm    Respiratory and Thorax: normal effort; Breath sounds: normal; No rales/wheezing/rhonchi  	  Cardiovascular: S1, S2, regular, NMBR	    Gastrointestinal: BS + x 4Q genralized tenderness, stri strips noted to mid abd, lower mid with one other SHE DRAINAGE noted. 	    Genitourinary:  Bladder nondistended, nontender    Musculoskeletal:   General Right:   no muscle/joint tenderness,   normal tone, no joint swelling,   ROM: full	    General Left:   no muscle/joint tenderness,   normal tone, no joint swelling,   ROM: full    Lower extrems:   Right: no calf tenderness              negative julián's sign               + pedal pulses    Left:   no calf tenderness              negative julián's sign               + pedal pulses                                   14.3   8.20  )-----------( 317      ( 17 May 2018 06:25 )             41.6       05-17    144  |  108  |  11  ----------------------------<  102<H>  4.0   |  28  |  0.96    Ca    9.3      17 May 2018 06:25  Phos  2.3     05-17                         12.5   7.73  )-----------( 255      ( 16 May 2018 05:55 )             37.4       05-16    144  |  112<H>  |  8   ----------------------------<  105<H>  3.6   |  24  |  0.82    Ca    8.6      16 May 2018 05:55                              12.7   9.58  )-----------( 251      ( 15 May 2018 06:00 )             38.4       05-15    140  |  109<H>  |  9   ----------------------------<  104<H>  4.4   |  24  |  1.02    Ca    8.8      15 May 2018 06:00    TPro  7.7  /  Alb  4.0  /  TBili  0.8  /  DBili  x   /  AST  26  /  ALT  24  /  AlkPhos  60  05-13      PT/INR - ( 13 May 2018 13:10 )   PT: 14.3 sec;   INR: 1.32 ratio         PTT - ( 13 May 2018 13:10 )  PTT:36.8 sec                        14.6   8.74  )-----------( 290      ( 14 May 2018 07:45 )             43.9       05-14    143  |  112<H>  |  10  ----------------------------<  109<H>  3.8   |  22  |  1.00    Ca    8.7      14 May 2018 07:45    TPro  7.7  /  Alb  4.0  /  TBili  0.8  /  DBili  x   /  AST  26  /  ALT  24  /  AlkPhos  60  05-13      PT/INR - ( 13 May 2018 13:10 )   PT: 14.3 sec;   INR: 1.32 ratio         PTT - ( 13 May 2018 13:10 )  PTT:36.8 sec				    MEDICATIONS  (STANDING):    MEDICATIONS  (STANDING):  apixaban 5 milliGRAM(s) Oral every 12 hours  buDESOnide   0.5 milliGRAM(s) Respule 0.5 milliGRAM(s) Inhalation two times a day  diltiazem    Tablet 60 milliGRAM(s) Oral at bedtime  diltiazem    milliGRAM(s) Oral daily  lisinopril 20 milliGRAM(s) Oral two times a day  metoprolol tartrate 25 milliGRAM(s) Oral two times a day  pantoprazole  Injectable 40 milliGRAM(s) IV Push daily  piperacillin/tazobactam IVPB. 3.375 Gram(s) IV Intermittent every 8 hours  simvastatin 10 milliGRAM(s) Oral at bedtime  tiotropium 18 MICROgram(s) Capsule 1 Capsule(s) Inhalation daily        DVT Prophylaxis:  LMWH                   (  )  Heparin SQ           (  )  Coumadin             (  )  Xarelto                  (  )  Eliquis                   (x)  Venodynes           ( x )  Ambulation          (x  )  UFH                       (  )  Contraindicated  (  )
HPI:  Pt seen and examined at bedside with chaperone. Pt is AAOx3, pt in no acute distress. Pt has c/o progressively worsening right lower quadrant pain since 5/11/18. Pt denied c/o fever, chills, chest pain, SOB, N/V/D, extremity pain or dysfunction, hemoptysis, hematemesis, hematuria, hematochexia, headache, diplopia, vertigo, dizzyness. Pt tolerating diet, (+) void, (+) ambulation, (+) bowel function    Pt on eliquis anticoagulation, last dose was evening 5/12/18 (13 May 2018 18:06)      Patient is a 73y old  Female who presents with a chief complaint of abd pain since 5/11/18 (13 May 2018 18:06)      Consulted by Dr. Dr. Linda Toscano  for VTE prophylaxis, risk stratification, and anticoagulation management.    PAST MEDICAL & SURGICAL HISTORY:  Hyperlipidemia, unspecified hyperlipidemia type  Malignant neoplasm of left female breast, unspecified estrogen receptor status, unspecified site of breast  Chronic obstructive pulmonary disease, unspecified COPD type  Hypertension, unspecified type  Afib  Pacemaker  S/P right knee arthroscopy  Artificial pacemaker  H/O mastectomy, left    Caprini VTE Risk Score: 8    [id number] Score: 3    IMPROVE Bleeding Risk Score: 2.5    Falls Risk:   High (  )  Mod (  )  Low (x  )    EBL:   ml  crcrl: 51.9    5-14-18 Pt seen at bedside this a.m. before planned appendectomy.  discussed her anticoagulation with Eliquis and she has taken her last dose on 5-12 p.m. dose. Has been on it for about 2 years.  Informed her we will resume her Eliquis after surgery as long as it is safe.  questions answered will reinforce as needed.   5-15-18 Pt seen at bedside.  s/p appendectomy yesterday.  States she feels better. Informed her she is on heparin sq for today and will start her Eliquis tomorrow.    Vital Signs Last 24 Hrs  T(C): 36.9 (05-15-18 @ 05:13), Max: 36.9 (05-14-18 @ 16:30)  T(F): 98.4 (05-15-18 @ 05:13), Max: 98.5 (05-14-18 @ 16:30)  HR: 78 (05-15-18 @ 08:15) (65 - 84)  BP: 153/62 (05-15-18 @ 05:13) (100/47 - 153/62)  BP(mean): --  RR: 17 (05-15-18 @ 05:13) (12 - 19)  SpO2: 92% (05-15-18 @ 05:13) (90% - 98%)    FAMILY HISTORY:  No pertinent family history in first degree relatives    Denies any personal or familial history of clotting or bleeding disorders.    Allergies    No Known Allergies    Intolerances    Mobic (Other)      REVIEW OF SYSTEMS    (  )Fever	     (  )Constipation	(  )SOB				(  )Headache	(  )Dysuria  (  )Chills	     (  )Melena	(  )Dyspnea present on exertion	                    (  )Dizziness                    (  )Polyuria  (  )Nausea	     (  )Hematochezia	(  )Cough			                    (  )Syncope   	(  )Hematuria  (  )Vomiting    (  )Chest Pain	(  )Wheezing			(  )Weakness  (  )Diarrhea     (  )Palpitations	(  )Anorexia			(  )Myalgia    All other review of systems negative: Yes      PHYSICAL EXAM:    Constitutional: Appears Well    Neurological: A& O x 3    Skin: Warm    Respiratory and Thorax: normal effort; Breath sounds: normal; No rales/wheezing/rhonchi  	  Cardiovascular: S1, S2, regular, NMBR	    Gastrointestinal: BS + x 4Q genralized tenderness, stri strips noted to mid abd, lower mid with one other SHE DRAINAGE noted. 	    Genitourinary:  Bladder nondistended, nontender    Musculoskeletal:   General Right:   no muscle/joint tenderness,   normal tone, no joint swelling,   ROM: full	    General Left:   no muscle/joint tenderness,   normal tone, no joint swelling,   ROM: full    Lower extrems:   Right: no calf tenderness              negative julián's sign               + pedal pulses    Left:   no calf tenderness              negative julián's sign               + pedal pulses                        12.7   9.58  )-----------( 251      ( 15 May 2018 06:00 )             38.4       05-15    140  |  109<H>  |  9   ----------------------------<  104<H>  4.4   |  24  |  1.02    Ca    8.8      15 May 2018 06:00    TPro  7.7  /  Alb  4.0  /  TBili  0.8  /  DBili  x   /  AST  26  /  ALT  24  /  AlkPhos  60  05-13      PT/INR - ( 13 May 2018 13:10 )   PT: 14.3 sec;   INR: 1.32 ratio         PTT - ( 13 May 2018 13:10 )  PTT:36.8 sec                        14.6   8.74  )-----------( 290      ( 14 May 2018 07:45 )             43.9       05-14    143  |  112<H>  |  10  ----------------------------<  109<H>  3.8   |  22  |  1.00    Ca    8.7      14 May 2018 07:45    TPro  7.7  /  Alb  4.0  /  TBili  0.8  /  DBili  x   /  AST  26  /  ALT  24  /  AlkPhos  60  05-13      PT/INR - ( 13 May 2018 13:10 )   PT: 14.3 sec;   INR: 1.32 ratio         PTT - ( 13 May 2018 13:10 )  PTT:36.8 sec				    MEDICATIONS  (STANDING):          DVT Prophylaxis:  LMWH                   (  )  Heparin SQ           ( x )  Coumadin             (  )  Xarelto                  (  )  Eliquis                   ( hold )  Venodynes           ( x )  Ambulation          (x  )  UFH                       (  )  Contraindicated  (  )
HPI:  Pt seen and examined at bedside with chaperone. Pt is AAOx3, pt in no acute distress. Pt has c/o progressively worsening right lower quadrant pain since 5/11/18. Pt denied c/o fever, chills, chest pain, SOB, N/V/D, extremity pain or dysfunction, hemoptysis, hematemesis, hematuria, hematochexia, headache, diplopia, vertigo, dizzyness. Pt tolerating diet, (+) void, (+) ambulation, (+) bowel function    Pt on eliquis anticoagulation, last dose was evening 5/12/18 (13 May 2018 18:06)      Patient is a 73y old  Female who presents with a chief complaint of abd pain since 5/11/18 (13 May 2018 18:06)      Consulted by Dr. Dr. Linda Toscano  for VTE prophylaxis, risk stratification, and anticoagulation management.    PAST MEDICAL & SURGICAL HISTORY:  Hyperlipidemia, unspecified hyperlipidemia type  Malignant neoplasm of left female breast, unspecified estrogen receptor status, unspecified site of breast  Chronic obstructive pulmonary disease, unspecified COPD type  Hypertension, unspecified type  Afib  Pacemaker  S/P right knee arthroscopy  Artificial pacemaker  H/O mastectomy, left    Caprini VTE Risk Score: 8    [id number] Score: 3    IMPROVE Bleeding Risk Score: 2.5    Falls Risk:   High (  )  Mod (  )  Low (x  )    EBL:   ml  crcrl: 51.9    5-14-18 Pt seen at bedside this a.m. before planned appendectomy.  discussed her anticoagulation with Eliquis and she has taken her last dose on 5-12 p.m. dose. Has been on it for about 2 years.  Informed her we will resume her Eliquis after surgery as long as it is safe.  questions answered will reinforce as needed.   5-15-18 Pt seen at bedside.  s/p appendectomy yesterday.  States she feels better. Informed her she is on heparin sq for today and will start her Eliquis tomorrow.  5-16-18 Pt seen at bedside on 2N.  states she feels better today.  Resumed her Eliquis today.  pt has no concerns.      Vital Signs Last 24 Hrs  T(C): 36.9 (05-15-18 @ 05:13), Max: 36.9 (05-14-18 @ 16:30)  T(F): 98.4 (05-15-18 @ 05:13), Max: 98.5 (05-14-18 @ 16:30)  HR: 78 (05-15-18 @ 08:15) (65 - 84)  BP: 153/62 (05-15-18 @ 05:13) (100/47 - 153/62)  BP(mean): --  RR: 17 (05-15-18 @ 05:13) (12 - 19)  SpO2: 92% (05-15-18 @ 05:13) (90% - 98%)    FAMILY HISTORY:  No pertinent family history in first degree relatives    Denies any personal or familial history of clotting or bleeding disorders.    Allergies    No Known Allergies    Intolerances    Mobic (Other)      REVIEW OF SYSTEMS    (  )Fever	     (  )Constipation	(  )SOB				(  )Headache	(  )Dysuria  (  )Chills	     (  )Melena	(  )Dyspnea present on exertion	                    (  )Dizziness                    (  )Polyuria  (  )Nausea	     (  )Hematochezia	(  )Cough			                    (  )Syncope   	(  )Hematuria  (  )Vomiting    (  )Chest Pain	(  )Wheezing			(  )Weakness  (  )Diarrhea     (  )Palpitations	(  )Anorexia			(  )Myalgia    All other review of systems negative: Yes      PHYSICAL EXAM:    Constitutional: Appears Well    Neurological: A& O x 3    Skin: Warm    Respiratory and Thorax: normal effort; Breath sounds: normal; No rales/wheezing/rhonchi  	  Cardiovascular: S1, S2, regular, NMBR	    Gastrointestinal: BS + x 4Q genralized tenderness, stri strips noted to mid abd, lower mid with one other SHE DRAINAGE noted. 	    Genitourinary:  Bladder nondistended, nontender    Musculoskeletal:   General Right:   no muscle/joint tenderness,   normal tone, no joint swelling,   ROM: full	    General Left:   no muscle/joint tenderness,   normal tone, no joint swelling,   ROM: full    Lower extrems:   Right: no calf tenderness              negative julián's sign               + pedal pulses    Left:   no calf tenderness              negative julián's sign               + pedal pulses                        12.5   7.73  )-----------( 255      ( 16 May 2018 05:55 )             37.4       05-16    144  |  112<H>  |  8   ----------------------------<  105<H>  3.6   |  24  |  0.82    Ca    8.6      16 May 2018 05:55                              12.7   9.58  )-----------( 251      ( 15 May 2018 06:00 )             38.4       05-15    140  |  109<H>  |  9   ----------------------------<  104<H>  4.4   |  24  |  1.02    Ca    8.8      15 May 2018 06:00    TPro  7.7  /  Alb  4.0  /  TBili  0.8  /  DBili  x   /  AST  26  /  ALT  24  /  AlkPhos  60  05-13      PT/INR - ( 13 May 2018 13:10 )   PT: 14.3 sec;   INR: 1.32 ratio         PTT - ( 13 May 2018 13:10 )  PTT:36.8 sec                        14.6   8.74  )-----------( 290      ( 14 May 2018 07:45 )             43.9       05-14    143  |  112<H>  |  10  ----------------------------<  109<H>  3.8   |  22  |  1.00    Ca    8.7      14 May 2018 07:45    TPro  7.7  /  Alb  4.0  /  TBili  0.8  /  DBili  x   /  AST  26  /  ALT  24  /  AlkPhos  60  05-13      PT/INR - ( 13 May 2018 13:10 )   PT: 14.3 sec;   INR: 1.32 ratio         PTT - ( 13 May 2018 13:10 )  PTT:36.8 sec				    MEDICATIONS  (STANDING):          DVT Prophylaxis:  LMWH                   (  )  Heparin SQ           (  )  Coumadin             (  )  Xarelto                  (  )  Eliquis                   (x)  Venodynes           ( x )  Ambulation          (x  )  UFH                       (  )  Contraindicated  (  )
Patient is a 73y old  Female who presents with a chief complaint of abd pain since 18 (13 May 2018 18:06)      HPI:  Pt seen and examined at bedside with chaperone. Pt is AAOx3, pt in no acute distress. Pt has c/o progressively worsening right lower quadrant pain since 18. Pt denied c/o fever, chills, chest pain, SOB, N/V/D, extremity pain or dysfunction, hemoptysis, hematemesis, hematuria, hematochexia, headache, diplopia, vertigo, dizzyness. Pt tolerating diet, (+) void, (+) ambulation, (+) bowel function    Pt on eliquis anticoagulation, last dose was evening 18 (13 May 2018 18:06)    5/15  feels better post op  no distress  no cp  GARCIA    increased wheezing and GARCIA after returned from bathroom this am  no cp    pt is seen with dr Gutierrez today  now in AFIB with mod vent response  no cp  GARCIA about the same  PAST MEDICAL & SURGICAL HISTORY:  Hyperlipidemia, unspecified hyperlipidemia type  Malignant neoplasm of left female breast, unspecified estrogen receptor status, unspecified site of breast  Chronic obstructive pulmonary disease, unspecified COPD type  Hypertension, unspecified type  Afib  Pacemaker  S/P right knee arthroscopy  Artificial pacemaker  H/O mastectomy, left      PREVIOUS DIAGNOSTIC TESTING:      MEDICATIONS  (STANDING):  apixaban 5 milliGRAM(s) Oral every 12 hours  buDESOnide   0.5 milliGRAM(s) Respule 0.5 milliGRAM(s) Inhalation two times a day  diltiazem    Tablet 60 milliGRAM(s) Oral at bedtime  diltiazem    milliGRAM(s) Oral daily  lisinopril 20 milliGRAM(s) Oral two times a day  metoprolol tartrate 25 milliGRAM(s) Oral two times a day  pantoprazole  Injectable 40 milliGRAM(s) IV Push daily  piperacillin/tazobactam IVPB. 3.375 Gram(s) IV Intermittent every 8 hours  simvastatin 10 milliGRAM(s) Oral at bedtime  tiotropium 18 MICROgram(s) Capsule 1 Capsule(s) Inhalation daily        MEDICATIONS  (PRN):  acetaminophen   Tablet 650 milliGRAM(s) Oral every 6 hours PRN For Temp greater than 38 C (100.4 F)  ALBUTerol    90 MICROgram(s) HFA Inhaler 2 Puff(s) Inhalation every 6 hours PRN Shortness of Breath and/or Wheezing  HYDROmorphone  Injectable 0.5 milliGRAM(s) IV Push every 4 hours PRN Moderate Pain  HYDROmorphone  Injectable 1 milliGRAM(s) IV Push every 4 hours PRN Severe Pain  ondansetron Injectable 4 milliGRAM(s) IV Push every 6 hours PRN Nausea      FAMILY HISTORY:  No pertinent family history in first degree relatives      SOCIAL HISTORY: Works as a nurse    REVIEW OF SYSTEM:  +Abdominal pain, denies dyspnea, otherwise 12 point ROS negativ e    Vital Signs Last 24 Hrs  T(C): 37 (17 May 2018 05:48), Max: 37.2 (16 May 2018 23:30)  T(F): 98.6 (17 May 2018 05:48), Max: 98.9 (16 May 2018 23:30)  HR: 83 (17 May 2018 05:48) (77 - 103)  BP: 133/78 (17 May 2018 05:48) (131/65 - 159/85)  BP(mean): --  RR: 16 (17 May 2018 05:48) (15 - 16)  SpO2: 95% (17 May 2018 05:48) (90% - 96%)        PHYSICAL EXAM  General Appearance: cooperative, no acute distress,   HEENT: PERRL, conjunctiva clear, EOM's intact, non injected pharynx, no exudate, TM   normal  Neck: Supple, , no adenopathy, thyroid: not enlarged, no carotid bruit or JVD  Back: Symmetric, no  tenderness,no soft tissue tenderness  Lungs: Clear to auscultation bilaterally  Heart: +S1,S2  Abdomen: Soft, non-tender, bowel sounds active , no hepatosplenomegaly  Extremities: no cyanosis or edema, no joint swelling  Skin: Skin color, texture normal, no rashes   Neurologic: Alert and oriented X3 , cranial nerves intact, sensory and motor normal,    ECG:    cxr    < from: Xray Chest 1 View- PORTABLE-Routine (05.15.18 @ 11:31) >  PROCEDURE DATE:  05/15/2018          INTERPRETATION:  History: Postop    Portable AP radiograph of the chest is performed. comparison is made to   2018.    The cardiomediastinal silhouette is normal. dual-lead pacemaker is again   seen entering from the left. Left axillary clips are again noted. Mild   elevation of the right diaphragm is again seen. The lungs are clear. The   osseous structures are unremarkable.    Impression: Pacemaker. No active pulmonary disease. No change.    < end of copied text >      LABS:                          14.6   8.74  )-----------( 290      ( 14 May 2018 07:45 )             43.9     05-13    138  |  107  |  15  ----------------------------<  99  4.5   |  23  |  1.21    Ca    9.3      13 May 2018 12:22    TPro  7.7  /  Alb  4.0  /  TBili  0.8  /  DBili  x   /  AST  26  /  ALT  24  /  AlkPhos  60  05-13            PT/INR - ( 13 May 2018 13:10 )   PT: 14.3 sec;   INR: 1.32 ratio         PTT - ( 13 May 2018 13:10 )  PTT:36.8 sec  Urinalysis Basic - ( 13 May 2018 13:05 )    Color: Yellow / Appearance: Clear / S.005 / pH: x  Gluc: x / Ketone: Negative  / Bili: Negative / Urobili: Negative mg/dL   Blood: x / Protein: Negative mg/dL / Nitrite: Negative   Leuk Esterase: Trace / RBC: 0-2 /HPF / WBC 3-5   Sq Epi: x / Non Sq Epi: Occasional / Bacteria: Few            RADIOLOGY & ADDITIONAL STUDIES:    Impression    Prominence of the appendix with surrounding soft tissue infiltration most   consistent with appendicitis in the correct clinical context. No   appendicolith, collection or evidence of perforation.    Solitary subcentimeter gallbladder calculus with no other acute findings    Slight prominence of the pancreatic head with areas of decreased   attenuation most consistent with fatty infiltration, no gross mass.    Hepatomegaly with diffuse fatty infiltration, no focal mass or   intrahepatic biliary ductal dilatation.    CXR  Left cardiac device and surgical clips.    Atelectasis lung bases. No consolidation or pleural effusion.    Heart size cannot be accurately assessed in this projection.
Patient is a 73y old  Female who presents with a chief complaint of abd pain since 18 (13 May 2018 18:06)      HPI:  Pt seen and examined at bedside with chaperone. Pt is AAOx3, pt in no acute distress. Pt has c/o progressively worsening right lower quadrant pain since 18. Pt denied c/o fever, chills, chest pain, SOB, N/V/D, extremity pain or dysfunction, hemoptysis, hematemesis, hematuria, hematochexia, headache, diplopia, vertigo, dizzyness. Pt tolerating diet, (+) void, (+) ambulation, (+) bowel function    Pt on eliquis anticoagulation, last dose was evening 18 (13 May 2018 18:06)    5/15  feels better post op  no distress  no cp  GARCIA  PAST MEDICAL & SURGICAL HISTORY:  Hyperlipidemia, unspecified hyperlipidemia type  Malignant neoplasm of left female breast, unspecified estrogen receptor status, unspecified site of breast  Chronic obstructive pulmonary disease, unspecified COPD type  Hypertension, unspecified type  Afib  Pacemaker  S/P right knee arthroscopy  Artificial pacemaker  H/O mastectomy, left      PREVIOUS DIAGNOSTIC TESTING:      MEDICATIONS  (STANDING):  ALBUTerol    90 MICROgram(s) HFA Inhaler 2 Puff(s) Inhalation once  diltiazem    Tablet 60 milliGRAM(s) Oral at bedtime  heparin  Injectable 5000 Unit(s) SubCutaneous every 12 hours  lisinopril 20 milliGRAM(s) Oral two times a day  metoprolol tartrate 25 milliGRAM(s) Oral two times a day  pantoprazole  Injectable 40 milliGRAM(s) IV Push daily  piperacillin/tazobactam IVPB. 3.375 Gram(s) IV Intermittent every 8 hours  simvastatin 10 milliGRAM(s) Oral at bedtime  sodium chloride 0.9%. 1000 milliLiter(s) (75 mL/Hr) IV Continuous <Continuous>  tiotropium 18 MICROgram(s) Capsule 1 Capsule(s) Inhalation daily    MEDICATIONS  (PRN):  acetaminophen   Tablet 650 milliGRAM(s) Oral every 6 hours PRN For Temp greater than 38 C (100.4 F)  ALBUTerol    90 MICROgram(s) HFA Inhaler 2 Puff(s) Inhalation every 6 hours PRN Shortness of Breath and/or Wheezing  HYDROmorphone  Injectable 0.5 milliGRAM(s) IV Push every 4 hours PRN Moderate Pain  HYDROmorphone  Injectable 1 milliGRAM(s) IV Push every 4 hours PRN Severe Pain  ondansetron Injectable 4 milliGRAM(s) IV Push every 6 hours PRN Nausea      FAMILY HISTORY:  No pertinent family history in first degree relatives      SOCIAL HISTORY: Works as a nurse    REVIEW OF SYSTEM:  +Abdominal pain, denies dyspnea, otherwise 12 point ROS negativ e    Vital Signs Last 24 Hrs  T(C): 36.9 (15 May 2018 05:13), Max: 36.9 (14 May 2018 16:30)  T(F): 98.4 (15 May 2018 05:13), Max: 98.5 (14 May 2018 16:30)  HR: 78 (15 May 2018 08:15) (65 - 84)  BP: 153/62 (15 May 2018 05:13) (100/47 - 153/62)  BP(mean): --  RR: 17 (15 May 2018 05:13) (12 - 19)  SpO2: 92% (15 May 2018 05:13) (90% - 98%)          PHYSICAL EXAM  General Appearance: cooperative, no acute distress,   HEENT: PERRL, conjunctiva clear, EOM's intact, non injected pharynx, no exudate, TM   normal  Neck: Supple, , no adenopathy, thyroid: not enlarged, no carotid bruit or JVD  Back: Symmetric, no  tenderness,no soft tissue tenderness  Lungs: Clear to auscultation bilaterally  Heart: +S1,S2  Abdomen: Soft, non-tender, bowel sounds active , no hepatosplenomegaly  Extremities: no cyanosis or edema, no joint swelling  Skin: Skin color, texture normal, no rashes   Neurologic: Alert and oriented X3 , cranial nerves intact, sensory and motor normal,    ECG:    LABS:                          14.6   8.74  )-----------( 290      ( 14 May 2018 07:45 )             43.9     -    138  |  107  |  15  ----------------------------<  99  4.5   |  23  |  1.21    Ca    9.3      13 May 2018 12:22    TPro  7.7  /  Alb  4.0  /  TBili  0.8  /  DBili  x   /  AST  26  /  ALT  24  /  AlkPhos  60              PT/INR - ( 13 May 2018 13:10 )   PT: 14.3 sec;   INR: 1.32 ratio         PTT - ( 13 May 2018 13:10 )  PTT:36.8 sec  Urinalysis Basic - ( 13 May 2018 13:05 )    Color: Yellow / Appearance: Clear / S.005 / pH: x  Gluc: x / Ketone: Negative  / Bili: Negative / Urobili: Negative mg/dL   Blood: x / Protein: Negative mg/dL / Nitrite: Negative   Leuk Esterase: Trace / RBC: 0-2 /HPF / WBC 3-5   Sq Epi: x / Non Sq Epi: Occasional / Bacteria: Few            RADIOLOGY & ADDITIONAL STUDIES:    Impression    Prominence of the appendix with surrounding soft tissue infiltration most   consistent with appendicitis in the correct clinical context. No   appendicolith, collection or evidence of perforation.    Solitary subcentimeter gallbladder calculus with no other acute findings    Slight prominence of the pancreatic head with areas of decreased   attenuation most consistent with fatty infiltration, no gross mass.    Hepatomegaly with diffuse fatty infiltration, no focal mass or   intrahepatic biliary ductal dilatation.    CXR  Left cardiac device and surgical clips.    Atelectasis lung bases. No consolidation or pleural effusion.    Heart size cannot be accurately assessed in this projection.
Patient is a 73y old  Female who presents with a chief complaint of abd pain since 5/11/18 (13 May 2018 18:06)      HPI:  Pt seen and examined at bedside with chaperone. Pt is AAOx3, pt in no acute distress. Pt has c/o progressively worsening right lower quadrant pain since 5/11/18. Pt denied c/o fever, chills, chest pain, SOB, N/V/D, extremity pain or dysfunction, hemoptysis, hematemesis, hematuria, hematochexia, headache, diplopia, vertigo, dizzyness. Pt tolerating diet, (+) void, (+) ambulation, (+) bowel function    Pt on eliquis anticoagulation, last dose was evening 5/12/18 (13 May 2018 18:06)  5/14: Pt seen and examined, NAD C/O RLQ pain, slightly better, no fever no nausea  ROS:.  [X] A ten-point review of systems was otherwise negative except as noted.  Systemic:	[ ] Fever	[ ] Chills	[ ] Night sweats    [ ] Fatigue	[ ] Other  [] Cardiovascular:  [] Pulmonary:  [] Renal/Urologic:  [] Gastrointestinal: abdominal pain, vomiting  [] Metabolic:  [] Neurologic:  [] Hematologic:  [] ENT:  [] Ophthalmologic:  [] Musculoskeletal:    [ ] Due to altered mental status/intubation, subjective information were not able to be obtained from the patient. History was obtained, to the extent possible, from review of the chart and collateral sources of information.    All other system review is negative .  PAST MEDICAL & SURGICAL HISTORY:  Hyperlipidemia, unspecified hyperlipidemia type  Malignant neoplasm of left female breast, unspecified estrogen receptor status, unspecified site of breast  Chronic obstructive pulmonary disease, unspecified COPD type  Hypertension, unspecified type  Afib  Pacemaker  S/P right knee arthroscopy  Artificial pacemaker  H/O mastectomy, left    FAMILY HISTORY:  No pertinent family history in first degree relatives    Social HX:   Alcohol: Denied  Smoking: Denied  Drug Use: Denied        Vital Signs Last 24 Hrs  T(C): 36.7 (14 May 2018 17:11), Max: 37.2 (13 May 2018 20:01)  T(F): 98 (14 May 2018 17:11), Max: 98.9 (13 May 2018 20:01)  HR: 71 (14 May 2018 17:11) (62 - 78)  BP: 121/59 (14 May 2018 17:11) (100/47 - 129/60)  BP(mean): --  RR: 18 (14 May 2018 17:11) (12 - 19)  SpO2: 95% (14 May 2018 17:11) (90% - 98%)    I&O's Summary    13 May 2018 07:01  -  14 May 2018 07:00  --------------------------------------------------------  IN: 1090 mL / OUT: 0 mL / NET: 1090 mL    14 May 2018 07:01  -  14 May 2018 17:58  --------------------------------------------------------  IN: 1000 mL / OUT: 185 mL / NET: 815 mL        PHYSICAL EXAM:  Constitutional: NAD  Eyes:  WNL  ENMT:  WNL  Neck:  WNL, non tender  Back: Non tender  Respiratory: CTABL  Cardiovascular:  S1+S2+0  Gastrointestinal: Soft, N, RLQ , suprapubic and LLQ tenderness with rebound and guarding.  Genitourinary:  WNL  Extremities: NV intact  Vascular:  Intact  Neurological: No focal neurological deficit,  CN, motor and sensory system grossly intact.  Skin: WNL  Musculoskeletal: WNL  Psychiatric: Grossly WNL        Labs:                          14.6   8.74  )-----------( 290      ( 14 May 2018 07:45 )             43.9       05-14    143  |  112<H>  |  10  ----------------------------<  109<H>  3.8   |  22  |  1.00    Ca    8.7      14 May 2018 07:45    TPro  7.7  /  Alb  4.0  /  TBili  0.8  /  DBili  x   /  AST  26  /  ALT  24  /  AlkPhos  60  05-13      PT/INR - ( 13 May 2018 13:10 )   PT: 14.3 sec;   INR: 1.32 ratio         PTT - ( 13 May 2018 13:10 )  PTT:36.8 sec
Patient is a 73y old  Female who presents with a chief complaint of abd pain since 5/11/18 (13 May 2018 18:06)      HPI:  Pt seen and examined at bedside with chaperone. Pt is AAOx3, pt in no acute distress. Pt has c/o progressively worsening right lower quadrant pain since 5/11/18. Pt denied c/o fever, chills, chest pain, SOB, N/V/D, extremity pain or dysfunction, hemoptysis, hematemesis, hematuria, hematochexia, headache, diplopia, vertigo, dizzyness. Pt tolerating diet, (+) void, (+) ambulation, (+) bowel function    Pt on eliquis anticoagulation, last dose was evening 5/12/18 (13 May 2018 18:06)  5/15: Pt seen and examined, Pain well controlled, no fever, no nausea, no GI function. Ambulated to the bathroom. C/O  Wheezing with mild hypoxia, requiring Supplemental o2.  ROS:.  [X] A ten-point review of systems was otherwise negative except as noted.  Systemic:	[ ] Fever	[ ] Chills	[ ] Night sweats    [ ] Fatigue	[ ] Other  [] Cardiovascular:  [] Pulmonary:  [] Renal/Urologic:  [] Gastrointestinal: abdominal pain, vomiting  [] Metabolic:  [] Neurologic:  [] Hematologic:  [] ENT:  [] Ophthalmologic:  [] Musculoskeletal:    [ ] Due to altered mental status/intubation, subjective information were not able to be obtained from the patient. History was obtained, to the extent possible, from review of the chart and collateral sources of information.    All other system review is negative .  PAST MEDICAL & SURGICAL HISTORY:  Hyperlipidemia, unspecified hyperlipidemia type  Malignant neoplasm of left female breast, unspecified estrogen receptor status, unspecified site of breast  Chronic obstructive pulmonary disease, unspecified COPD type  Hypertension, unspecified type  Afib  Pacemaker  S/P right knee arthroscopy  Artificial pacemaker  H/O mastectomy, left    FAMILY HISTORY:  No pertinent family history in first degree relatives    Social HX:   Alcohol: Denied  Smoking: Denied  Drug Use: Denied        Vital Signs Last 24 Hrs  T(C): 37.4 (15 May 2018 11:12), Max: 37.4 (15 May 2018 11:12)  T(F): 99.4 (15 May 2018 11:12), Max: 99.4 (15 May 2018 11:12)  HR: 84 (15 May 2018 11:12) (65 - 84)  BP: 134/59 (15 May 2018 11:12) (100/47 - 153/62)  BP(mean): --  RR: 17 (15 May 2018 11:12) (12 - 19)  SpO2: 92% (15 May 2018 11:12) (90% - 97%)    I&O's Summary    14 May 2018 07:01  -  15 May 2018 07:00  --------------------------------------------------------  IN: 1350 mL / OUT: 665 mL / NET: 685 mL    15 May 2018 07:01  -  15 May 2018 12:09  --------------------------------------------------------  IN: 240 mL / OUT: 1 mL / NET: 239 mL        PHYSICAL EXAM:  Constitutional: NAD  Eyes:  WNL  ENMT:  WNL  Neck:  WNL, non tender  Back: Non tender  Respiratory: CTABL  Cardiovascular:  S1+S2+0  Gastrointestinal: Soft, mild distension, appropriately tender, SHE serosanguinous  Genitourinary:  WNL  Extremities: NV intact  Vascular:  Intact  Neurological: No focal neurological deficit,  CN, motor and sensory system grossly intact.  Skin: WNL  Musculoskeletal: WNL  Psychiatric: Grossly WNL        Labs:                          12.7   9.58  )-----------( 251      ( 15 May 2018 06:00 )             38.4       05-15    140  |  109<H>  |  9   ----------------------------<  104<H>  4.4   |  24  |  1.02    Ca    8.8      15 May 2018 06:00    TPro  7.7  /  Alb  4.0  /  TBili  0.8  /  DBili  x   /  AST  26  /  ALT  24  /  AlkPhos  60  05-13      PT/INR - ( 13 May 2018 13:10 )   PT: 14.3 sec;   INR: 1.32 ratio         PTT - ( 13 May 2018 13:10 )  PTT:36.8 sec
Stable sugically.    Vital Signs Last 24 Hrs  T(C): 37.1 (16 May 2018 17:57), Max: 37.1 (16 May 2018 00:00)  T(F): 98.8 (16 May 2018 17:57), Max: 98.8 (16 May 2018 00:00)  HR: 103 (16 May 2018 17:57) (74 - 103)  BP: 159/85 (16 May 2018 17:57) (101/58 - 159/85)  BP(mean): --  RR: 16 (16 May 2018 17:57) (16 - 16)  SpO2: 95% (16 May 2018 17:57) (90% - 95%)                                12.5   7.73  )-----------( 255      ( 16 May 2018 05:55 )             37.4     CBC Full  -  ( 16 May 2018 05:55 )  WBC Count : 7.73 K/uL  Hemoglobin : 12.5 g/dL  Hematocrit : 37.4 %  Platelet Count - Automated : 255 K/uL  Mean Cell Volume : 91.9 fl  Mean Cell Hemoglobin : 30.7 pg  Mean Cell Hemoglobin Concentration : 33.4 gm/dL  Auto Neutrophil # : x  Auto Lymphocyte # : x  Auto Monocyte # : x  Auto Eosinophil # : x  Auto Basophil # : x  Auto Neutrophil % : x  Auto Lymphocyte % : x  Auto Monocyte % : x  Auto Eosinophil % : x  Auto Basophil % : x    05-16    144  |  112<H>  |  8   ----------------------------<  105<H>  3.6   |  24  |  0.82    Ca    8.6      16 May 2018 05:55

## 2018-05-17 NOTE — DISCHARGE NOTE ADULT - CARE PLAN
Principal Discharge DX:	Acute appendicitis, unspecified acute appendicitis type  Goal:	s/p appendectomy  Assessment and plan of treatment:	Please seek immediate medical attention for worsening abdominal pain, chest pain, shortness of breath, any adverse changes to health

## 2018-05-17 NOTE — DISCHARGE NOTE ADULT - CARE PROVIDER_API CALL
Rica Puri), Surgery; Surgical Critical Care  [address] [facility]  [address]  [place] [address]  Phone: [phone]  Fax: [phone]    Lev Gutierrez), Cardiovascular Disease; Internal Medicine  [address] [place] [address]  Phone: [phone]  Fax: [phone]    SONIA Justin (), Pulmonary Disease; Sleep Medicine  [address] [place] [address]  Phone: [phone]  Fax: [phone]

## 2018-05-21 LAB — SURGICAL PATHOLOGY FINAL REPORT - CH: SIGNIFICANT CHANGE UP

## 2018-05-22 DIAGNOSIS — T45.515A ADVERSE EFFECT OF ANTICOAGULANTS, INITIAL ENCOUNTER: ICD-10-CM

## 2018-05-22 DIAGNOSIS — N18.2 CHRONIC KIDNEY DISEASE, STAGE 2 (MILD): ICD-10-CM
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
## 2018-05-22 DIAGNOSIS — M19.90 UNSPECIFIED OSTEOARTHRITIS, UNSPECIFIED SITE: ICD-10-CM

## 2018-05-22 DIAGNOSIS — E78.5 HYPERLIPIDEMIA, UNSPECIFIED: ICD-10-CM

## 2018-05-22 DIAGNOSIS — R16.0 HEPATOMEGALY, NOT ELSEWHERE CLASSIFIED: ICD-10-CM

## 2018-05-22 DIAGNOSIS — K80.20 CALCULUS OF GALLBLADDER WITHOUT CHOLECYSTITIS WITHOUT OBSTRUCTION: ICD-10-CM

## 2018-05-22 DIAGNOSIS — Z92.21 PERSONAL HISTORY OF ANTINEOPLASTIC CHEMOTHERAPY: ICD-10-CM

## 2018-05-22 DIAGNOSIS — K35.3 ACUTE APPENDICITIS WITH LOCALIZED PERITONITIS: ICD-10-CM
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
## 2018-05-22 DIAGNOSIS — J44.1 CHRONIC OBSTRUCTIVE PULMONARY DISEASE WITH (ACUTE) EXACERBATION: ICD-10-CM

## 2018-05-22 DIAGNOSIS — Z79.01 LONG TERM (CURRENT) USE OF ANTICOAGULANTS: ICD-10-CM
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
## 2018-05-22 DIAGNOSIS — Z95.0 PRESENCE OF CARDIAC PACEMAKER: ICD-10-CM

## 2018-05-22 DIAGNOSIS — Z85.3 PERSONAL HISTORY OF MALIGNANT NEOPLASM OF BREAST: ICD-10-CM

## 2018-05-22 DIAGNOSIS — K76.0 FATTY (CHANGE OF) LIVER, NOT ELSEWHERE CLASSIFIED: ICD-10-CM

## 2018-05-22 DIAGNOSIS — I49.5 SICK SINUS SYNDROME: ICD-10-CM

## 2018-05-22 DIAGNOSIS — I13.10 HYPERTENSIVE HEART AND CHRONIC KIDNEY DISEASE WITHOUT HEART FAILURE, WITH STAGE 1 THROUGH STAGE 4 CHRONIC KIDNEY DISEASE, OR UNSPECIFIED CHRONIC KIDNEY DISEASE: ICD-10-CM

## 2018-05-22 DIAGNOSIS — I48.0 PAROXYSMAL ATRIAL FIBRILLATION: ICD-10-CM

## 2018-05-22 DIAGNOSIS — Z87.891 PERSONAL HISTORY OF NICOTINE DEPENDENCE: ICD-10-CM

## 2018-05-22 DIAGNOSIS — J98.11 ATELECTASIS: ICD-10-CM

## 2018-05-22 DIAGNOSIS — Q45.3 OTHER CONGENITAL MALFORMATIONS OF PANCREAS AND PANCREATIC DUCT: ICD-10-CM

## 2018-05-23 ENCOUNTER — APPOINTMENT (OUTPATIENT)
Dept: SURGERY | Facility: CLINIC | Age: 73
End: 2018-05-23
Payer: MEDICARE

## 2018-05-23 PROCEDURE — 99024 POSTOP FOLLOW-UP VISIT: CPT

## 2018-06-18 ENCOUNTER — FORM ENCOUNTER (OUTPATIENT)
Age: 73
End: 2018-06-18

## 2018-06-19 ENCOUNTER — APPOINTMENT (OUTPATIENT)
Dept: ULTRASOUND IMAGING | Facility: CLINIC | Age: 73
End: 2018-06-19
Payer: MEDICARE

## 2018-06-19 ENCOUNTER — OUTPATIENT (OUTPATIENT)
Dept: OUTPATIENT SERVICES | Facility: HOSPITAL | Age: 73
LOS: 1 days | End: 2018-06-19
Payer: MEDICARE

## 2018-06-19 ENCOUNTER — APPOINTMENT (OUTPATIENT)
Dept: MAMMOGRAPHY | Facility: CLINIC | Age: 73
End: 2018-06-19
Payer: MEDICARE

## 2018-06-19 DIAGNOSIS — Z95.0 PRESENCE OF CARDIAC PACEMAKER: Chronic | ICD-10-CM

## 2018-06-19 DIAGNOSIS — Z00.8 ENCOUNTER FOR OTHER GENERAL EXAMINATION: ICD-10-CM

## 2018-06-19 DIAGNOSIS — Z98.890 OTHER SPECIFIED POSTPROCEDURAL STATES: Chronic | ICD-10-CM

## 2018-06-19 DIAGNOSIS — Z90.12 ACQUIRED ABSENCE OF LEFT BREAST AND NIPPLE: Chronic | ICD-10-CM

## 2018-06-19 PROCEDURE — 77063 BREAST TOMOSYNTHESIS BI: CPT | Mod: 26,52

## 2018-06-19 PROCEDURE — 77063 BREAST TOMOSYNTHESIS BI: CPT

## 2018-06-19 PROCEDURE — 77067 SCR MAMMO BI INCL CAD: CPT | Mod: 26,52,RT

## 2018-06-19 PROCEDURE — 76641 ULTRASOUND BREAST COMPLETE: CPT | Mod: 26,RT

## 2018-06-19 PROCEDURE — 77067 SCR MAMMO BI INCL CAD: CPT

## 2018-06-19 PROCEDURE — 76641 ULTRASOUND BREAST COMPLETE: CPT

## 2018-07-23 ENCOUNTER — APPOINTMENT (OUTPATIENT)
Dept: ELECTROPHYSIOLOGY | Facility: CLINIC | Age: 73
End: 2018-07-23
Payer: MEDICARE

## 2018-07-23 PROCEDURE — 93296 REM INTERROG EVL PM/IDS: CPT

## 2018-07-23 PROCEDURE — 93294 REM INTERROG EVL PM/LDLS PM: CPT

## 2018-10-23 ENCOUNTER — APPOINTMENT (OUTPATIENT)
Dept: ELECTROPHYSIOLOGY | Facility: CLINIC | Age: 73
End: 2018-10-23
Payer: MEDICARE

## 2018-10-23 VITALS
SYSTOLIC BLOOD PRESSURE: 142 MMHG | BODY MASS INDEX: 25.27 KG/M2 | WEIGHT: 148 LBS | DIASTOLIC BLOOD PRESSURE: 72 MMHG | HEART RATE: 64 BPM | HEIGHT: 64 IN

## 2018-10-23 PROCEDURE — 93280 PM DEVICE PROGR EVAL DUAL: CPT

## 2018-10-23 RX ORDER — APIXABAN 5 MG/1
5 TABLET, FILM COATED ORAL
Refills: 0 | Status: ACTIVE | COMMUNITY

## 2018-10-24 PROBLEM — E78.5 HYPERLIPIDEMIA, UNSPECIFIED: Chronic | Status: ACTIVE | Noted: 2018-05-13

## 2018-12-14 NOTE — PATIENT PROFILE ADULT. - SURGICAL SITE INCISION
Anesthesia ROS/Med Hx    Overall Review:  Pts. EKG was reviewed     Pulmonary Review:    Pt. positive for sleep apnea   The patient is not a smoker.    Neuro/Psych Review:    Pt. positive for TIA  Pt. positive for CVA    Cardiovascular Review:    Exercise tolerance: good  Pt. negative for CAD  Pt. positive for angina  Pt. positive for hypertension - well controlled  Pt. positive for hyperlipidemia  Pt. positive for dysrhythmias - Chronic A-fib    GI/HEPATIC/RENAL Review:    Pt. positive for renal disease    End/Other Review:    Pt. positive for diabetes  Pt. positive for thrombocytopenia  Pt. positive for obesity super morbid - >50      Anesthesia Plan     ASA Status: 4  Anesthesia Type: MAC  Induction: Intravenous  Reviewed: Lab Results, Nursing Notes, EKG, Consultations, Patient Summary, Allergies, Problem List, Medications, NPO Status, Pre-Induction Reassessment and Past Med History  The proposed anesthetic plan, including its risks and benefits, have been discussed with the Patient - along with the risks and benefits of alternatives.  Questions were encouraged and answered and the patient and/or representative agrees to proceed.  Blood Products: Not Anticipated      Physical Exam  Mallampati: III  TM Distance: <3 FB  Neck ROM: Full  Cardio Rhythm: Regular  Cardio Rate: Normal  Breath sounds clear to auscultation:  Yes  Patient Demonstrates:  Obese  dental exam normal                  
no

## 2019-01-28 ENCOUNTER — OUTPATIENT (OUTPATIENT)
Dept: OUTPATIENT SERVICES | Facility: HOSPITAL | Age: 74
LOS: 1 days | End: 2019-01-28
Payer: MEDICARE

## 2019-01-28 ENCOUNTER — APPOINTMENT (OUTPATIENT)
Dept: RADIOLOGY | Facility: CLINIC | Age: 74
End: 2019-01-28
Payer: MEDICARE

## 2019-01-28 DIAGNOSIS — Z00.8 ENCOUNTER FOR OTHER GENERAL EXAMINATION: ICD-10-CM

## 2019-01-28 DIAGNOSIS — Z90.12 ACQUIRED ABSENCE OF LEFT BREAST AND NIPPLE: Chronic | ICD-10-CM

## 2019-01-28 DIAGNOSIS — Z95.0 PRESENCE OF CARDIAC PACEMAKER: Chronic | ICD-10-CM

## 2019-01-28 DIAGNOSIS — Z98.890 OTHER SPECIFIED POSTPROCEDURAL STATES: Chronic | ICD-10-CM

## 2019-01-28 PROCEDURE — 77080 DXA BONE DENSITY AXIAL: CPT | Mod: 26

## 2019-01-28 PROCEDURE — 77080 DXA BONE DENSITY AXIAL: CPT

## 2019-02-04 ENCOUNTER — APPOINTMENT (OUTPATIENT)
Dept: ELECTROPHYSIOLOGY | Facility: CLINIC | Age: 74
End: 2019-02-04
Payer: MEDICARE

## 2019-02-04 PROCEDURE — 93296 REM INTERROG EVL PM/IDS: CPT

## 2019-02-04 PROCEDURE — 93294 REM INTERROG EVL PM/LDLS PM: CPT

## 2019-05-01 NOTE — PAST MEDICAL HISTORY
[Menarche Age ____] : age at menarche was [unfilled] [Menopause Age____] : age at menopause was [unfilled] [Total Preg ___] : G[unfilled]

## 2019-05-01 NOTE — FAMILY HISTORY
[No Family History of Breast Cancer] : No Family History of Breast Cancer  [No Family History of Ovarian Cancer] : No Family History of Ovarian Cancer

## 2019-05-02 ENCOUNTER — APPOINTMENT (OUTPATIENT)
Dept: BREAST CENTER | Facility: CLINIC | Age: 74
End: 2019-05-02
Payer: MEDICARE

## 2019-05-02 VITALS
HEIGHT: 64 IN | HEART RATE: 60 BPM | WEIGHT: 150 LBS | BODY MASS INDEX: 25.61 KG/M2 | DIASTOLIC BLOOD PRESSURE: 70 MMHG | SYSTOLIC BLOOD PRESSURE: 142 MMHG

## 2019-05-02 DIAGNOSIS — Z80.6 FAMILY HISTORY OF LEUKEMIA: ICD-10-CM

## 2019-05-02 DIAGNOSIS — Z80.0 FAMILY HISTORY OF MALIGNANT NEOPLASM OF DIGESTIVE ORGANS: ICD-10-CM

## 2019-05-02 DIAGNOSIS — Z85.3 PERSONAL HISTORY OF MALIGNANT NEOPLASM OF BREAST: ICD-10-CM

## 2019-05-02 DIAGNOSIS — Z85.820 PERSONAL HISTORY OF MALIGNANT MELANOMA OF SKIN: ICD-10-CM

## 2019-05-02 PROCEDURE — 99214 OFFICE O/P EST MOD 30 MIN: CPT

## 2019-05-02 RX ORDER — DILTIAZEM HYDROCHLORIDE 60 MG/1
60 CAPSULE, EXTENDED RELEASE ORAL
Refills: 0 | Status: ACTIVE | COMMUNITY

## 2019-05-02 NOTE — CONSULT LETTER
[Courtesy Letter:] : I had the pleasure of seeing your patient, [unfilled], in my office today. [Dear  ___] : Dear ~REY, [Sincerely,] : Sincerely, [Please see my note below.] : Please see my note below. [FreeTextEntry2] : Sandra Cummins MD [FreeTextEntry3] : Lary Fair MD FACS\par

## 2019-05-02 NOTE — HISTORY OF PRESENT ILLNESS
[FreeTextEntry1] : 74 year old female with a remote history of stage 1 left breast carcinoma presents for a surveillance examination.  She offers no complaints.

## 2019-05-02 NOTE — PHYSICAL EXAM
[Normocephalic] : normocephalic [Conjunctiva pink] : conjunctiva pink [Supple] : supple [Sclera nonicteric] : sclera nonicteric [No Thyromegaly] : no thyromegaly [No Supraclavicular Adenopathy] : no supraclavicular adenopathy [No Cervical Adenopathy] : no cervical adenopathy [Normal S1, S2] : normal S1 and S2 [Normal Sinus Rhythm] : normal sinus rhythm [Clear to Auscultation Bilat] : clear to auscultation bilaterally [No Rubs] : no pericardial rub [No Gallops] : no gallops [Murmurs] : no murmurs [Asymmetrical] : asymmetrical [No dominant masses] : no dominant masses in right breast  [No Nipple Discharge] : no right nipple discharge [No Nipple Retraction] : no right nipple retraction [Normal Bowel Sounds] : normal bowel sounds  [No Axillary Lymphadenopathy] : no left axillary lymphadenopathy [Soft] : abdomen soft [No Hepato-Splenomegaly] : no hepato-splenomegaly [No Swelling] : no swelling [No Rashes] : no rashes [Grade 1] : Ptosis Grade 1 [de-identified] : S/P mastectomy. No masses or skin lesions.

## 2019-05-06 ENCOUNTER — APPOINTMENT (OUTPATIENT)
Dept: ELECTROPHYSIOLOGY | Facility: CLINIC | Age: 74
End: 2019-05-06
Payer: MEDICARE

## 2019-05-06 PROCEDURE — 93296 REM INTERROG EVL PM/IDS: CPT

## 2019-05-06 PROCEDURE — 93294 REM INTERROG EVL PM/LDLS PM: CPT

## 2019-06-19 ENCOUNTER — FORM ENCOUNTER (OUTPATIENT)
Age: 74
End: 2019-06-19

## 2019-06-20 ENCOUNTER — OUTPATIENT (OUTPATIENT)
Dept: OUTPATIENT SERVICES | Facility: HOSPITAL | Age: 74
LOS: 1 days | End: 2019-06-20
Payer: MEDICARE

## 2019-06-20 ENCOUNTER — APPOINTMENT (OUTPATIENT)
Dept: ULTRASOUND IMAGING | Facility: CLINIC | Age: 74
End: 2019-06-20
Payer: MEDICARE

## 2019-06-20 ENCOUNTER — APPOINTMENT (OUTPATIENT)
Dept: MAMMOGRAPHY | Facility: CLINIC | Age: 74
End: 2019-06-20
Payer: MEDICARE

## 2019-06-20 DIAGNOSIS — Z98.890 OTHER SPECIFIED POSTPROCEDURAL STATES: Chronic | ICD-10-CM

## 2019-06-20 DIAGNOSIS — Z00.8 ENCOUNTER FOR OTHER GENERAL EXAMINATION: ICD-10-CM

## 2019-06-20 DIAGNOSIS — Z90.12 ACQUIRED ABSENCE OF LEFT BREAST AND NIPPLE: Chronic | ICD-10-CM

## 2019-06-20 DIAGNOSIS — Z08 ENCOUNTER FOR FOLLOW-UP EXAMINATION AFTER COMPLETED TREATMENT FOR MALIGNANT NEOPLASM: ICD-10-CM

## 2019-06-20 DIAGNOSIS — Z95.0 PRESENCE OF CARDIAC PACEMAKER: Chronic | ICD-10-CM

## 2019-06-20 PROCEDURE — 76641 ULTRASOUND BREAST COMPLETE: CPT | Mod: 26,RT

## 2019-06-20 PROCEDURE — 77063 BREAST TOMOSYNTHESIS BI: CPT | Mod: 26,52

## 2019-06-20 PROCEDURE — 77063 BREAST TOMOSYNTHESIS BI: CPT

## 2019-06-20 PROCEDURE — 77067 SCR MAMMO BI INCL CAD: CPT | Mod: 26,RT,52

## 2019-06-20 PROCEDURE — 77067 SCR MAMMO BI INCL CAD: CPT

## 2019-06-20 PROCEDURE — 76641 ULTRASOUND BREAST COMPLETE: CPT

## 2019-08-12 ENCOUNTER — APPOINTMENT (OUTPATIENT)
Dept: ELECTROPHYSIOLOGY | Facility: CLINIC | Age: 74
End: 2019-08-12
Payer: MEDICARE

## 2019-08-12 PROCEDURE — 93296 REM INTERROG EVL PM/IDS: CPT

## 2019-08-12 PROCEDURE — 93294 REM INTERROG EVL PM/LDLS PM: CPT

## 2019-10-22 ENCOUNTER — APPOINTMENT (OUTPATIENT)
Dept: ELECTROPHYSIOLOGY | Facility: CLINIC | Age: 74
End: 2019-10-22
Payer: MEDICARE

## 2019-10-22 VITALS
HEIGHT: 64 IN | WEIGHT: 146 LBS | DIASTOLIC BLOOD PRESSURE: 67 MMHG | BODY MASS INDEX: 24.92 KG/M2 | SYSTOLIC BLOOD PRESSURE: 141 MMHG | OXYGEN SATURATION: 91 % | HEART RATE: 60 BPM

## 2019-10-22 PROCEDURE — 93280 PM DEVICE PROGR EVAL DUAL: CPT

## 2020-01-20 ENCOUNTER — APPOINTMENT (OUTPATIENT)
Dept: ELECTROPHYSIOLOGY | Facility: CLINIC | Age: 75
End: 2020-01-20
Payer: MEDICARE

## 2020-01-20 PROCEDURE — 93296 REM INTERROG EVL PM/IDS: CPT

## 2020-01-20 PROCEDURE — 93294 REM INTERROG EVL PM/LDLS PM: CPT

## 2020-02-06 ENCOUNTER — TRANSCRIPTION ENCOUNTER (OUTPATIENT)
Age: 75
End: 2020-02-06

## 2020-02-21 ENCOUNTER — TRANSCRIPTION ENCOUNTER (OUTPATIENT)
Age: 75
End: 2020-02-21

## 2020-04-27 ENCOUNTER — APPOINTMENT (OUTPATIENT)
Dept: ELECTROPHYSIOLOGY | Facility: CLINIC | Age: 75
End: 2020-04-27
Payer: MEDICARE

## 2020-04-27 PROCEDURE — 93296 REM INTERROG EVL PM/IDS: CPT

## 2020-04-27 PROCEDURE — 93294 REM INTERROG EVL PM/LDLS PM: CPT

## 2020-06-22 NOTE — FAMILY HISTORY
[No Family History of Ovarian Cancer] : No Family History of Ovarian Cancer [No Family History of Breast Cancer] : No Family History of Breast Cancer

## 2020-06-22 NOTE — PAST MEDICAL HISTORY
[Menopause Age____] : age at menopause was [unfilled] [Menarche Age ____] : age at menarche was [unfilled] [Total Preg ___] : G[unfilled]

## 2020-06-23 ENCOUNTER — APPOINTMENT (OUTPATIENT)
Dept: BREAST CENTER | Facility: CLINIC | Age: 75
End: 2020-06-23
Payer: MEDICARE

## 2020-06-23 VITALS
SYSTOLIC BLOOD PRESSURE: 115 MMHG | HEIGHT: 64 IN | HEART RATE: 63 BPM | DIASTOLIC BLOOD PRESSURE: 67 MMHG | WEIGHT: 150 LBS | BODY MASS INDEX: 25.61 KG/M2

## 2020-06-23 PROCEDURE — 99214 OFFICE O/P EST MOD 30 MIN: CPT

## 2020-06-23 NOTE — HISTORY OF PRESENT ILLNESS
[FreeTextEntry1] : 75 year old female with a remote history of stage 1 left breast carcinoma presents for a surveillance examination.  She offers no complaints.

## 2020-06-23 NOTE — PHYSICAL EXAM
[Normocephalic] : normocephalic [Sclera nonicteric] : sclera nonicteric [Conjunctiva pink] : conjunctiva pink [Supple] : supple [No Supraclavicular Adenopathy] : no supraclavicular adenopathy [No Cervical Adenopathy] : no cervical adenopathy [No Thyromegaly] : no thyromegaly [Clear to Auscultation Bilat] : clear to auscultation bilaterally [Normal Sinus Rhythm] : normal sinus rhythm [Normal S1, S2] : normal S1 and S2 [Murmurs] : no murmurs [No Gallops] : no gallops [No Rubs] : no pericardial rub [Asymmetrical] : asymmetrical [No dominant masses] : no dominant masses in right breast  [No Nipple Retraction] : no right nipple retraction [No Nipple Discharge] : no right nipple discharge [No Axillary Lymphadenopathy] : no left axillary lymphadenopathy [Soft] : abdomen soft [Normal Bowel Sounds] : normal bowel sounds  [No Hepato-Splenomegaly] : no hepato-splenomegaly [No Swelling] : no swelling [No Rashes] : no rashes [Grade 2] : Ptosis Grade 2 [de-identified] : S/P mastectomy. No masses or skin lesions.

## 2020-06-29 ENCOUNTER — RESULT REVIEW (OUTPATIENT)
Age: 75
End: 2020-06-29

## 2020-06-29 ENCOUNTER — APPOINTMENT (OUTPATIENT)
Dept: MAMMOGRAPHY | Facility: CLINIC | Age: 75
End: 2020-06-29
Payer: MEDICARE

## 2020-06-29 ENCOUNTER — OUTPATIENT (OUTPATIENT)
Dept: OUTPATIENT SERVICES | Facility: HOSPITAL | Age: 75
LOS: 1 days | End: 2020-06-29
Payer: MEDICARE

## 2020-06-29 ENCOUNTER — APPOINTMENT (OUTPATIENT)
Dept: ULTRASOUND IMAGING | Facility: CLINIC | Age: 75
End: 2020-06-29
Payer: MEDICARE

## 2020-06-29 DIAGNOSIS — Z85.3 PERSONAL HISTORY OF MALIGNANT NEOPLASM OF BREAST: ICD-10-CM

## 2020-06-29 DIAGNOSIS — Z98.890 OTHER SPECIFIED POSTPROCEDURAL STATES: Chronic | ICD-10-CM

## 2020-06-29 DIAGNOSIS — Z90.12 ACQUIRED ABSENCE OF LEFT BREAST AND NIPPLE: Chronic | ICD-10-CM

## 2020-06-29 DIAGNOSIS — Z95.0 PRESENCE OF CARDIAC PACEMAKER: Chronic | ICD-10-CM

## 2020-06-29 PROCEDURE — 77063 BREAST TOMOSYNTHESIS BI: CPT

## 2020-06-29 PROCEDURE — 76641 ULTRASOUND BREAST COMPLETE: CPT | Mod: 26,RT

## 2020-06-29 PROCEDURE — 77067 SCR MAMMO BI INCL CAD: CPT

## 2020-06-29 PROCEDURE — 77067 SCR MAMMO BI INCL CAD: CPT | Mod: 26,RT,52

## 2020-06-29 PROCEDURE — 76641 ULTRASOUND BREAST COMPLETE: CPT

## 2020-06-29 PROCEDURE — 77063 BREAST TOMOSYNTHESIS BI: CPT | Mod: 26,52

## 2020-06-30 ENCOUNTER — APPOINTMENT (OUTPATIENT)
Dept: ELECTROPHYSIOLOGY | Facility: CLINIC | Age: 75
End: 2020-06-30

## 2020-07-08 ENCOUNTER — APPOINTMENT (OUTPATIENT)
Dept: ELECTROPHYSIOLOGY | Facility: CLINIC | Age: 75
End: 2020-07-08

## 2020-08-03 ENCOUNTER — APPOINTMENT (OUTPATIENT)
Dept: ELECTROPHYSIOLOGY | Facility: CLINIC | Age: 75
End: 2020-08-03
Payer: MEDICARE

## 2020-08-03 PROCEDURE — 93294 REM INTERROG EVL PM/LDLS PM: CPT

## 2020-08-03 PROCEDURE — 93296 REM INTERROG EVL PM/IDS: CPT

## 2020-09-28 ENCOUNTER — INPATIENT (INPATIENT)
Facility: HOSPITAL | Age: 75
LOS: 2 days | Discharge: HOME | DRG: 258 | End: 2020-10-01
Attending: INTERNAL MEDICINE | Admitting: HOSPITALIST
Payer: MEDICARE

## 2020-09-28 VITALS — WEIGHT: 149.91 LBS | HEIGHT: 63 IN

## 2020-09-28 DIAGNOSIS — Z95.0 PRESENCE OF CARDIAC PACEMAKER: Chronic | ICD-10-CM

## 2020-09-28 DIAGNOSIS — Z45.010 ENCOUNTER FOR CHECKING AND TESTING OF CARDIAC PACEMAKER PULSE GENERATOR [BATTERY]: ICD-10-CM

## 2020-09-28 DIAGNOSIS — Z98.890 OTHER SPECIFIED POSTPROCEDURAL STATES: Chronic | ICD-10-CM

## 2020-09-28 DIAGNOSIS — Z90.12 ACQUIRED ABSENCE OF LEFT BREAST AND NIPPLE: Chronic | ICD-10-CM

## 2020-09-28 LAB
ADD ON TEST-SPECIMEN IN LAB: SIGNIFICANT CHANGE UP
ALBUMIN SERPL ELPH-MCNC: 3.9 G/DL — SIGNIFICANT CHANGE UP (ref 3.3–5)
ALP SERPL-CCNC: 73 U/L — SIGNIFICANT CHANGE UP (ref 40–120)
ALT FLD-CCNC: 24 U/L — SIGNIFICANT CHANGE UP (ref 12–78)
ANION GAP SERPL CALC-SCNC: 6 MMOL/L — SIGNIFICANT CHANGE UP (ref 5–17)
APTT BLD: 38.1 SEC — HIGH (ref 27.5–35.5)
AST SERPL-CCNC: 14 U/L — LOW (ref 15–37)
BASOPHILS # BLD AUTO: 0.13 K/UL — SIGNIFICANT CHANGE UP (ref 0–0.2)
BASOPHILS NFR BLD AUTO: 1 % — SIGNIFICANT CHANGE UP (ref 0–2)
BILIRUB SERPL-MCNC: 0.5 MG/DL — SIGNIFICANT CHANGE UP (ref 0.2–1.2)
BUN SERPL-MCNC: 24 MG/DL — HIGH (ref 7–23)
CALCIUM SERPL-MCNC: 9.5 MG/DL — SIGNIFICANT CHANGE UP (ref 8.5–10.1)
CHLORIDE SERPL-SCNC: 113 MMOL/L — HIGH (ref 96–108)
CO2 SERPL-SCNC: 23 MMOL/L — SIGNIFICANT CHANGE UP (ref 22–31)
CREAT SERPL-MCNC: 1.41 MG/DL — HIGH (ref 0.5–1.3)
D DIMER BLD IA.RAPID-MCNC: <150 NG/ML DDU — SIGNIFICANT CHANGE UP
EOSINOPHIL # BLD AUTO: 0.13 K/UL — SIGNIFICANT CHANGE UP (ref 0–0.5)
EOSINOPHIL NFR BLD AUTO: 1 % — SIGNIFICANT CHANGE UP (ref 0–6)
GLUCOSE SERPL-MCNC: 104 MG/DL — HIGH (ref 70–99)
HCT VFR BLD CALC: 45.9 % — HIGH (ref 34.5–45)
HGB BLD-MCNC: 15.7 G/DL — HIGH (ref 11.5–15.5)
INR BLD: 1.25 RATIO — HIGH (ref 0.88–1.16)
LYMPHOCYTES # BLD AUTO: 2.56 K/UL — SIGNIFICANT CHANGE UP (ref 1–3.3)
LYMPHOCYTES # BLD AUTO: 20 % — SIGNIFICANT CHANGE UP (ref 13–44)
MCHC RBC-ENTMCNC: 31.5 PG — SIGNIFICANT CHANGE UP (ref 27–34)
MCHC RBC-ENTMCNC: 34.2 GM/DL — SIGNIFICANT CHANGE UP (ref 32–36)
MCV RBC AUTO: 92 FL — SIGNIFICANT CHANGE UP (ref 80–100)
MONOCYTES # BLD AUTO: 1.41 K/UL — HIGH (ref 0–0.9)
MONOCYTES NFR BLD AUTO: 11 % — SIGNIFICANT CHANGE UP (ref 2–14)
NEUTROPHILS # BLD AUTO: 8.31 K/UL — HIGH (ref 1.8–7.4)
NEUTROPHILS NFR BLD AUTO: 65 % — SIGNIFICANT CHANGE UP (ref 43–77)
NRBC # BLD: SIGNIFICANT CHANGE UP /100 WBCS (ref 0–0)
PLATELET # BLD AUTO: 269 K/UL — SIGNIFICANT CHANGE UP (ref 150–400)
POTASSIUM SERPL-MCNC: 4.5 MMOL/L — SIGNIFICANT CHANGE UP (ref 3.5–5.3)
POTASSIUM SERPL-SCNC: 4.5 MMOL/L — SIGNIFICANT CHANGE UP (ref 3.5–5.3)
PROT SERPL-MCNC: 7.2 GM/DL — SIGNIFICANT CHANGE UP (ref 6–8.3)
PROTHROM AB SERPL-ACNC: 14.5 SEC — HIGH (ref 10.6–13.6)
RBC # BLD: 4.99 M/UL — SIGNIFICANT CHANGE UP (ref 3.8–5.2)
RBC # FLD: 13.3 % — SIGNIFICANT CHANGE UP (ref 10.3–14.5)
SARS-COV-2 RNA SPEC QL NAA+PROBE: SIGNIFICANT CHANGE UP
SODIUM SERPL-SCNC: 142 MMOL/L — SIGNIFICANT CHANGE UP (ref 135–145)
TROPONIN I SERPL-MCNC: <0.015 NG/ML — SIGNIFICANT CHANGE UP (ref 0.01–0.04)
TROPONIN I SERPL-MCNC: <0.015 NG/ML — SIGNIFICANT CHANGE UP (ref 0.01–0.04)
WBC # BLD: 12.78 K/UL — HIGH (ref 3.8–10.5)
WBC # FLD AUTO: 12.78 K/UL — HIGH (ref 3.8–10.5)

## 2020-09-28 PROCEDURE — 71045 X-RAY EXAM CHEST 1 VIEW: CPT | Mod: 26

## 2020-09-28 PROCEDURE — C1785: CPT

## 2020-09-28 PROCEDURE — 80053 COMPREHEN METABOLIC PANEL: CPT

## 2020-09-28 PROCEDURE — 99223 1ST HOSP IP/OBS HIGH 75: CPT

## 2020-09-28 PROCEDURE — 80048 BASIC METABOLIC PNL TOTAL CA: CPT

## 2020-09-28 PROCEDURE — 85730 THROMBOPLASTIN TIME PARTIAL: CPT

## 2020-09-28 PROCEDURE — 80061 LIPID PANEL: CPT

## 2020-09-28 PROCEDURE — 85610 PROTHROMBIN TIME: CPT

## 2020-09-28 PROCEDURE — 83735 ASSAY OF MAGNESIUM: CPT

## 2020-09-28 PROCEDURE — 93005 ELECTROCARDIOGRAM TRACING: CPT

## 2020-09-28 PROCEDURE — 83036 HEMOGLOBIN GLYCOSYLATED A1C: CPT

## 2020-09-28 PROCEDURE — 83880 ASSAY OF NATRIURETIC PEPTIDE: CPT

## 2020-09-28 PROCEDURE — 36415 COLL VENOUS BLD VENIPUNCTURE: CPT

## 2020-09-28 PROCEDURE — 84100 ASSAY OF PHOSPHORUS: CPT

## 2020-09-28 PROCEDURE — 94640 AIRWAY INHALATION TREATMENT: CPT

## 2020-09-28 PROCEDURE — C1895: CPT

## 2020-09-28 PROCEDURE — 81003 URINALYSIS AUTO W/O SCOPE: CPT

## 2020-09-28 PROCEDURE — 33228 REMV&REPLC PM GEN DUAL LEAD: CPT

## 2020-09-28 PROCEDURE — 93010 ELECTROCARDIOGRAM REPORT: CPT

## 2020-09-28 PROCEDURE — 99497 ADVNCD CARE PLAN 30 MIN: CPT

## 2020-09-28 PROCEDURE — 85025 COMPLETE CBC W/AUTO DIFF WBC: CPT

## 2020-09-28 PROCEDURE — 86769 SARS-COV-2 COVID-19 ANTIBODY: CPT

## 2020-09-28 PROCEDURE — 93306 TTE W/DOPPLER COMPLETE: CPT

## 2020-09-28 RX ORDER — ALBUTEROL 90 UG/1
2 AEROSOL, METERED ORAL
Qty: 0 | Refills: 0 | DISCHARGE

## 2020-09-28 RX ORDER — CHOLECALCIFEROL (VITAMIN D3) 125 MCG
1 CAPSULE ORAL
Qty: 0 | Refills: 0 | DISCHARGE

## 2020-09-28 RX ORDER — FENOFIBRATE,MICRONIZED 130 MG
1 CAPSULE ORAL
Qty: 0 | Refills: 0 | DISCHARGE

## 2020-09-28 RX ORDER — PREGABALIN 225 MG/1
1 CAPSULE ORAL
Qty: 0 | Refills: 0 | DISCHARGE

## 2020-09-28 RX ORDER — SIMVASTATIN 20 MG/1
1 TABLET, FILM COATED ORAL
Qty: 0 | Refills: 0 | DISCHARGE

## 2020-09-28 RX ORDER — ALPRAZOLAM 0.25 MG
1 TABLET ORAL
Qty: 0 | Refills: 0 | DISCHARGE

## 2020-09-28 NOTE — ED PROVIDER NOTE - OBJECTIVE STATEMENT
76 y/o female with PMHx of HLD, malignancy neoplasm of breast s/p mastectomy, COPD, HTN, Afib, s/p pacemaker presenting to the ED c/o SOB, lightheadedness x3 days. SOB is worsened with exertion, talking in full sentences. Patient is not on home O2, but with hx of hypoxia. Pt has pacemaker that is checked every 30 days, and battery is getting low and is due for exchange in x1 week with Dr. Stoddard. Spoke with NP at Dr. Stoddard's office, told pt to come to the ED for further evaluation. Denies CP, cough, abd pain, N//V/D, fever or chills. Former smoker, no EtOH use. No illicit drug use.   PCD: Dr. Catie Corrales   Cardio: Dr. Stoddard

## 2020-09-28 NOTE — ED ADULT TRIAGE NOTE - CHIEF COMPLAINT QUOTE
c/o shortness of breath for past 3 days, worse today, pt has history of emphysema, pacemaker need battery change, O2 sat in triage 94% on RA, pulse 65

## 2020-09-28 NOTE — ED PROVIDER NOTE - CLINICAL SUMMARY MEDICAL DECISION MAKING FREE TEXT BOX
Patient with PMHx of pacemaker, COPD her with SOB. Told that battery was at end of life and needs to be changed. Noted to have some irregular beats, and non capture of some beats per monitor.  Plan: Labs, pacemaker battery replacement, reassess.

## 2020-09-28 NOTE — ED PROVIDER NOTE - PROGRESS NOTE DETAILS
Katia WILKES for ED attending, Dr. Roberts: Spoke to Nicole from Dr. Stoddard's office, who is aware or patient, says pt should be admitted to CICU with external pacer in place. States that when the pacemaker gets to end of life it loses is atrial read, and patient loses the atrial kick and starts to feel SOB, typically takes longer whole pacemaker to die out. Recommends full panel of blood and admission.

## 2020-09-28 NOTE — ED STATDOCS - PROGRESS NOTE DETAILS
Katia MOORE for ED attending, Dr. Gu: 76 y/o female with PMHx of HLD, malignancy neoplasm of breast, COPD, HTN, Afib, with pacemaker presents to the ED c/o SOB x3 days. Worse with exertion.  Denies fevers, chills, cough, no chest pain. Pt has pacemaker, and battery is getting low. Pacemaker is checked every 30 days, last checked last week, scheduled for battery exchange next Wednesday. Will send pt to main ED for further evaluation.

## 2020-09-28 NOTE — ED PROVIDER NOTE - CARE PLAN
Principal Discharge DX:	Pacemaker at end of battery life   Principal Discharge DX:	Pacemaker at end of battery life  Secondary Diagnosis:	Dyspnea, unspecified type

## 2020-09-28 NOTE — ED ADULT NURSE NOTE - OBJECTIVE STATEMENT
Patient presents to the ED c/o SOB x3 days. Worse with exertion.  Denies fevers, chills, cough, no chest pain. Pt has pacemaker, and battery is getting low. Pacemaker is checked every 30 days, last checked last week, scheduled for battery exchange next Wednesday. Will send pt to main ED for further evaluation.

## 2020-09-29 LAB
A1C WITH ESTIMATED AVERAGE GLUCOSE RESULT: 5.8 % — HIGH (ref 4–5.6)
ADD ON TEST-SPECIMEN IN LAB: SIGNIFICANT CHANGE UP
ALBUMIN SERPL ELPH-MCNC: 3.8 G/DL — SIGNIFICANT CHANGE UP (ref 3.3–5)
ALP SERPL-CCNC: 79 U/L — SIGNIFICANT CHANGE UP (ref 40–120)
ALT FLD-CCNC: 23 U/L — SIGNIFICANT CHANGE UP (ref 12–78)
ANION GAP SERPL CALC-SCNC: 5 MMOL/L — SIGNIFICANT CHANGE UP (ref 5–17)
APPEARANCE UR: CLEAR — SIGNIFICANT CHANGE UP
APTT BLD: 36.2 SEC — HIGH (ref 27.5–35.5)
AST SERPL-CCNC: 13 U/L — LOW (ref 15–37)
BASOPHILS # BLD AUTO: 0.13 K/UL — SIGNIFICANT CHANGE UP (ref 0–0.2)
BASOPHILS NFR BLD AUTO: 1.4 % — SIGNIFICANT CHANGE UP (ref 0–2)
BILIRUB SERPL-MCNC: 0.7 MG/DL — SIGNIFICANT CHANGE UP (ref 0.2–1.2)
BILIRUB UR-MCNC: NEGATIVE — SIGNIFICANT CHANGE UP
BUN SERPL-MCNC: 19 MG/DL — SIGNIFICANT CHANGE UP (ref 7–23)
CALCIUM SERPL-MCNC: 9.8 MG/DL — SIGNIFICANT CHANGE UP (ref 8.5–10.1)
CHLORIDE SERPL-SCNC: 111 MMOL/L — HIGH (ref 96–108)
CHOLEST SERPL-MCNC: 171 MG/DL — SIGNIFICANT CHANGE UP (ref 10–199)
CO2 SERPL-SCNC: 27 MMOL/L — SIGNIFICANT CHANGE UP (ref 22–31)
COLOR SPEC: YELLOW — SIGNIFICANT CHANGE UP
CREAT SERPL-MCNC: 1.19 MG/DL — SIGNIFICANT CHANGE UP (ref 0.5–1.3)
DIFF PNL FLD: NEGATIVE — SIGNIFICANT CHANGE UP
EOSINOPHIL # BLD AUTO: 0.17 K/UL — SIGNIFICANT CHANGE UP (ref 0–0.5)
EOSINOPHIL NFR BLD AUTO: 1.8 % — SIGNIFICANT CHANGE UP (ref 0–6)
ESTIMATED AVERAGE GLUCOSE: 120 MG/DL — HIGH (ref 68–114)
GLUCOSE SERPL-MCNC: 104 MG/DL — HIGH (ref 70–99)
GLUCOSE UR QL: NEGATIVE MG/DL — SIGNIFICANT CHANGE UP
HCT VFR BLD CALC: 48.3 % — HIGH (ref 34.5–45)
HDLC SERPL-MCNC: 42 MG/DL — LOW
HGB BLD-MCNC: 15.9 G/DL — HIGH (ref 11.5–15.5)
IMM GRANULOCYTES NFR BLD AUTO: 0.5 % — SIGNIFICANT CHANGE UP (ref 0–1.5)
INR BLD: 1.15 RATIO — SIGNIFICANT CHANGE UP (ref 0.88–1.16)
KETONES UR-MCNC: NEGATIVE — SIGNIFICANT CHANGE UP
LEUKOCYTE ESTERASE UR-ACNC: NEGATIVE — SIGNIFICANT CHANGE UP
LIPID PNL WITH DIRECT LDL SERPL: 76 MG/DL — SIGNIFICANT CHANGE UP
LYMPHOCYTES # BLD AUTO: 1.99 K/UL — SIGNIFICANT CHANGE UP (ref 1–3.3)
LYMPHOCYTES # BLD AUTO: 21.3 % — SIGNIFICANT CHANGE UP (ref 13–44)
MAGNESIUM SERPL-MCNC: 2.6 MG/DL — SIGNIFICANT CHANGE UP (ref 1.6–2.6)
MCHC RBC-ENTMCNC: 30.8 PG — SIGNIFICANT CHANGE UP (ref 27–34)
MCHC RBC-ENTMCNC: 32.9 GM/DL — SIGNIFICANT CHANGE UP (ref 32–36)
MCV RBC AUTO: 93.4 FL — SIGNIFICANT CHANGE UP (ref 80–100)
MONOCYTES # BLD AUTO: 1.41 K/UL — HIGH (ref 0–0.9)
MONOCYTES NFR BLD AUTO: 15.1 % — HIGH (ref 2–14)
NEUTROPHILS # BLD AUTO: 5.58 K/UL — SIGNIFICANT CHANGE UP (ref 1.8–7.4)
NEUTROPHILS NFR BLD AUTO: 59.9 % — SIGNIFICANT CHANGE UP (ref 43–77)
NITRITE UR-MCNC: NEGATIVE — SIGNIFICANT CHANGE UP
PH UR: 5 — SIGNIFICANT CHANGE UP (ref 5–8)
PHOSPHATE SERPL-MCNC: 4 MG/DL — SIGNIFICANT CHANGE UP (ref 2.5–4.5)
PLATELET # BLD AUTO: 247 K/UL — SIGNIFICANT CHANGE UP (ref 150–400)
POTASSIUM SERPL-MCNC: 5.2 MMOL/L — SIGNIFICANT CHANGE UP (ref 3.5–5.3)
POTASSIUM SERPL-SCNC: 5.2 MMOL/L — SIGNIFICANT CHANGE UP (ref 3.5–5.3)
PROT SERPL-MCNC: 7.2 GM/DL — SIGNIFICANT CHANGE UP (ref 6–8.3)
PROT UR-MCNC: NEGATIVE MG/DL — SIGNIFICANT CHANGE UP
PROTHROM AB SERPL-ACNC: 13.3 SEC — SIGNIFICANT CHANGE UP (ref 10.6–13.6)
RAPID RVP RESULT: SIGNIFICANT CHANGE UP
RBC # BLD: 5.17 M/UL — SIGNIFICANT CHANGE UP (ref 3.8–5.2)
RBC # FLD: 13.5 % — SIGNIFICANT CHANGE UP (ref 10.3–14.5)
SARS-COV-2 IGG SERPL QL IA: NEGATIVE — SIGNIFICANT CHANGE UP
SARS-COV-2 IGM SERPL IA-ACNC: <3.8 AU/ML — SIGNIFICANT CHANGE UP
SODIUM SERPL-SCNC: 143 MMOL/L — SIGNIFICANT CHANGE UP (ref 135–145)
SP GR SPEC: 1.01 — SIGNIFICANT CHANGE UP (ref 1.01–1.02)
TOTAL CHOLESTEROL/HDL RATIO MEASUREMENT: 4 RATIO — SIGNIFICANT CHANGE UP (ref 3.3–7.1)
TRIGL SERPL-MCNC: 266 MG/DL — HIGH (ref 10–149)
UROBILINOGEN FLD QL: NEGATIVE MG/DL — SIGNIFICANT CHANGE UP
WBC # BLD: 9.33 K/UL — SIGNIFICANT CHANGE UP (ref 3.8–10.5)
WBC # FLD AUTO: 9.33 K/UL — SIGNIFICANT CHANGE UP (ref 3.8–10.5)

## 2020-09-29 PROCEDURE — 12345: CPT | Mod: NC

## 2020-09-29 PROCEDURE — 93306 TTE W/DOPPLER COMPLETE: CPT | Mod: 26

## 2020-09-29 PROCEDURE — 93010 ELECTROCARDIOGRAM REPORT: CPT

## 2020-09-29 RX ORDER — DILTIAZEM HCL 120 MG
300 CAPSULE, EXT RELEASE 24 HR ORAL DAILY
Refills: 0 | Status: DISCONTINUED | OUTPATIENT
Start: 2020-09-29 | End: 2020-10-01

## 2020-09-29 RX ORDER — ATORVASTATIN CALCIUM 80 MG/1
40 TABLET, FILM COATED ORAL AT BEDTIME
Refills: 0 | Status: DISCONTINUED | OUTPATIENT
Start: 2020-09-29 | End: 2020-10-01

## 2020-09-29 RX ORDER — SODIUM CHLORIDE 0.65 %
1 AEROSOL, SPRAY (ML) NASAL
Refills: 0 | Status: DISCONTINUED | OUTPATIENT
Start: 2020-09-29 | End: 2020-10-01

## 2020-09-29 RX ORDER — INFLUENZA VIRUS VACCINE 15; 15; 15; 15 UG/.5ML; UG/.5ML; UG/.5ML; UG/.5ML
0.5 SUSPENSION INTRAMUSCULAR ONCE
Refills: 0 | Status: COMPLETED | OUTPATIENT
Start: 2020-09-29 | End: 2020-09-29

## 2020-09-29 RX ORDER — DILTIAZEM HCL 120 MG
60 CAPSULE, EXT RELEASE 24 HR ORAL DAILY
Refills: 0 | Status: DISCONTINUED | OUTPATIENT
Start: 2020-09-29 | End: 2020-10-01

## 2020-09-29 RX ORDER — ALBUTEROL 90 UG/1
2 AEROSOL, METERED ORAL EVERY 4 HOURS
Refills: 0 | Status: DISCONTINUED | OUTPATIENT
Start: 2020-09-29 | End: 2020-10-01

## 2020-09-29 RX ORDER — BUDESONIDE AND FORMOTEROL FUMARATE DIHYDRATE 160; 4.5 UG/1; UG/1
2 AEROSOL RESPIRATORY (INHALATION)
Refills: 0 | Status: DISCONTINUED | OUTPATIENT
Start: 2020-09-29 | End: 2020-10-01

## 2020-09-29 RX ORDER — FUROSEMIDE 40 MG
40 TABLET ORAL DAILY
Refills: 0 | Status: COMPLETED | OUTPATIENT
Start: 2020-09-29 | End: 2020-09-30

## 2020-09-29 RX ORDER — ZOLPIDEM TARTRATE 10 MG/1
5 TABLET ORAL AT BEDTIME
Refills: 0 | Status: DISCONTINUED | OUTPATIENT
Start: 2020-09-29 | End: 2020-10-01

## 2020-09-29 RX ORDER — CHOLECALCIFEROL (VITAMIN D3) 125 MCG
1000 CAPSULE ORAL DAILY
Refills: 0 | Status: DISCONTINUED | OUTPATIENT
Start: 2020-09-29 | End: 2020-10-01

## 2020-09-29 RX ORDER — TIOTROPIUM BROMIDE 18 UG/1
1 CAPSULE ORAL; RESPIRATORY (INHALATION) DAILY
Refills: 0 | Status: DISCONTINUED | OUTPATIENT
Start: 2020-09-29 | End: 2020-10-01

## 2020-09-29 RX ORDER — ALPRAZOLAM 0.25 MG
0.25 TABLET ORAL
Refills: 0 | Status: DISCONTINUED | OUTPATIENT
Start: 2020-09-29 | End: 2020-10-01

## 2020-09-29 RX ORDER — METOPROLOL TARTRATE 50 MG
25 TABLET ORAL
Refills: 0 | Status: DISCONTINUED | OUTPATIENT
Start: 2020-09-29 | End: 2020-10-01

## 2020-09-29 RX ADMIN — Medication 25 MILLIGRAM(S): at 21:20

## 2020-09-29 RX ADMIN — Medication 0.25 MILLIGRAM(S): at 16:45

## 2020-09-29 RX ADMIN — Medication 25 MILLIGRAM(S): at 10:29

## 2020-09-29 RX ADMIN — BUDESONIDE AND FORMOTEROL FUMARATE DIHYDRATE 2 PUFF(S): 160; 4.5 AEROSOL RESPIRATORY (INHALATION) at 20:17

## 2020-09-29 RX ADMIN — Medication 300 MILLIGRAM(S): at 10:29

## 2020-09-29 RX ADMIN — Medication 40 MILLIGRAM(S): at 12:19

## 2020-09-29 RX ADMIN — Medication 1 TABLET(S): at 10:29

## 2020-09-29 RX ADMIN — Medication 1000 UNIT(S): at 10:29

## 2020-09-29 RX ADMIN — Medication 0.25 MILLIGRAM(S): at 21:29

## 2020-09-29 RX ADMIN — Medication 60 MILLIGRAM(S): at 21:20

## 2020-09-29 RX ADMIN — TIOTROPIUM BROMIDE 1 CAPSULE(S): 18 CAPSULE ORAL; RESPIRATORY (INHALATION) at 08:31

## 2020-09-29 RX ADMIN — ATORVASTATIN CALCIUM 40 MILLIGRAM(S): 80 TABLET, FILM COATED ORAL at 21:20

## 2020-09-29 NOTE — H&P ADULT - HISTORY OF PRESENT ILLNESS
74 y/o F w/ PMH of a-fib (on Eliquis), COPD (not on Home O2), dyslipidemia, breast cancer, PPM placement, p/w SOB. Patient states she chronically has SOB 2/2 COPD, but it is usually on exertion only. On Friday she started to develop SOB even at rest. Also developed some chest tightness, where she felt like her chest was being compressed. Denies radiation to arm  / jaw / neck. After her PPM was checked, she was told she had low battery and EP referred patient to ED for replacement. Patient was also told SOB is caused by low battery in her PPM as well.     States that CP has resolved at this time, and breathing is also at baseline.     Patient also c/o hoarness in her voice and sore throat that she developed today. Denies fever, chills, cough, runny nose, wheezin, nausea, vomiting, abdominal pain, diarrhea.       PSH: L mastectomy, R knee arthroscopy, PPM placement, appendectomy     Social Hx: Tobacco - quit 14 years ago  (used to smoke for 40 years), Etoh - Quit 36 years ago, Drugs - Denies     Family Hx: Father - pancreatic cancer, arrhythmia. Mother -  at 92 y/o, CVA?

## 2020-09-29 NOTE — CONSULT NOTE ADULT - SUBJECTIVE AND OBJECTIVE BOX
Patient is a 75y old  Female who presents with a chief complaint of SOB (29 Sep 2020 00:52)      HPI:    76 y/o F w/ PMH of a-fib (on Eliquis), COPD (not on Home O2), dyslipidemia, breast cancer, PPM placement, p/w SOB. Patient states she chronically has SOB 2/2 COPD, but it is usually on exertion only. On Friday she started to develop SOB even at rest. Also developed some chest tightness, where she felt like her chest was being compressed. Denies radiation to arm  / jaw / neck. After her PPM was checked, she was told she had low battery and EP referred patient to ED for replacement. Patient was also told SOB is caused by low battery in her PPM as well.     States that CP has resolved at this time, and breathing is also at baseline.     Patient also c/o hoarness in her voice and sore throat that she developed today. Denies fever, chills, cough, runny nose, wheezin, nausea, vomiting, abdominal pain, diarrhea.     pt is well known to me  she feels better but needs pacemaker change  seen along with her RN today    PSH: L mastectomy, R knee arthroscopy, PPM placement, appendectomy     Social Hx: Tobacco - quit 14 years ago  (used to smoke for 40 years), Etoh - Quit 36 years ago, Drugs - Denies     Family Hx: Father - pancreatic cancer, arrhythmia. Mother -  at 94 y/o, CVA?   (29 Sep 2020 00:52)      PAST MEDICAL & SURGICAL HISTORY:  Hyperlipidemia, unspecified hyperlipidemia type    Malignant neoplasm of left female breast, unspecified estrogen receptor status, unspecified site of breast    Chronic obstructive pulmonary disease, unspecified COPD type    Hypertension, unspecified type    Afib    Pacemaker    S/P right knee arthroscopy    Artificial pacemaker    H/O mastectomy, left        PREVIOUS DIAGNOSTIC TESTING:      MEDICATIONS  (STANDING):  atorvastatin 40 milliGRAM(s) Oral at bedtime  budesonide 160 MICROgram(s)/formoterol 4.5 MICROgram(s) Inhaler 2 Puff(s) Inhalation two times a day  cholecalciferol 1000 Unit(s) Oral daily  influenza   Vaccine 0.5 milliLiter(s) IntraMuscular once  multivitamin 1 Tablet(s) Oral daily  tiotropium 18 MICROgram(s) Capsule 1 Capsule(s) Inhalation daily    MEDICATIONS  (PRN):  ALBUTerol    90 MICROgram(s) HFA Inhaler 2 Puff(s) Inhalation every 4 hours PRN for shortness of breath and/or wheezing  sodium chloride 0.65% Nasal 1 Spray(s) Nasal every 2 hours PRN dry nasal passages  zolpidem 5 milliGRAM(s) Oral at bedtime PRN Insomnia  zolpidem 5 milliGRAM(s) Oral at bedtime PRN Insomnia      FAMILY HISTORY:  No pertinent family history in first degree relatives        SOCIAL HISTORY:  ***    REVIEW OF SYSTEM:  Pertinent items are noted in HPI.  Constitutional negative for chills, fevers, sweats and weight loss  throat, and face:  negative for epistaxis, nasal congestion, sore throat and   tinnitus  Respiratory: negative for cough, dyspnea on exertion, pleuritic chest pain  and wheezing  Cardiovascular:  negative for chest pain,pos dyspnea and palpitations  Gastrointestinal: negative for abdominal pain, diarrhea, nausea and vomiting  Genitourinary: negative for dysuria, frequency and urinary incontinence  Skin:  negative for redness, rash, pruritus, swelling, dryness and   fissures  Hematologic/lymphatic: negative for bleeding and easy bruising  Musculoskeletal: negative for arthralgias, back pain and muscle weakness  Neurological: negative for dizziness, headaches, seizures and tremors  Behavioral/Psych:  negative for mood change, depression, suicidal attempts    Allergic/Immunologic: negative for anaphylaxis, angioedema and urticaria    Vital Signs Last 24 Hrs  T(C): 35.9 (29 Sep 2020 04:33), Max: 36.8 (28 Sep 2020 20:02)  T(F): 96.6 (29 Sep 2020 04:33), Max: 98.3 (28 Sep 2020 20:02)  HR: 73 (29 Sep 2020 08:00) (60 - 85)  BP: 137/71 (29 Sep 2020 08:00) (102/47 - 142/100)  BP(mean): 87 (29 Sep 2020 08:00) (61 - 113)  RR: 18 (29 Sep 2020 08:00) (17 - 28)  SpO2: 97% (29 Sep 2020 08:00) (95% - 99%)    I&O's Summary    28 Sep 2020 07:01  -  29 Sep 2020 07:00  --------------------------------------------------------  IN: 0 mL / OUT: 200 mL / NET: -200 mL      PHYSICAL EXAM  General Appearance: cooperative, no acute distress,   HEENT: PERRL, conjunctiva clear, EOM's intact, non injected pharynx, no exudate, TM   normal  Neck: Supple, , no adenopathy, thyroid: not enlarged, no carotid bruit or JVD  Back: Symmetric, no  tenderness,no soft tissue tenderness  Lungs: Clear to auscultation bilateral,no adventitious breath sounds, normal   expiratory phase  Heart: Regular rate and rhythm, S1, S2 normal, no murmur, rub or gallop  Abdomen: Soft, non-tender, bowel sounds active , no hepatosplenomegaly  Extremities: no cyanosis or edema, no joint swelling  Skin: Skin color, texture normal, no rashes   Neurologic: Alert and oriented X3 , cranial nerves intact, sensory and motor normal,    ECG:    LABS:                          15.9   9.33  )-----------( 247      ( 29 Sep 2020 07:01 )             48.3         143  |  111<H>  |  19  ----------------------------<  104<H>  5.2   |  27  |  1.19    Ca    9.8      29 Sep 2020 07:01  Phos  4.0       Mg     2.6         TPro  7.2  /  Alb  3.8  /  TBili  0.7  /  DBili  x   /  AST  13<L>  /  ALT  23  /  AlkPhos  79      CARDIAC MARKERS ( 28 Sep 2020 22:53 )  <0.015 ng/mL / x     / x     / x     / x      CARDIAC MARKERS ( 28 Sep 2020 20:47 )  <0.015 ng/mL / x     / x     / x     / x            Pro BNP  --  @ 20:47  D Dimer  <150  @ 20:47    PT/INR - ( 29 Sep 2020 07:01 )   PT: 13.3 sec;   INR: 1.15 ratio         PTT - ( 29 Sep 2020 07:01 )  PTT:36.2 sec  Urinalysis Basic - ( 29 Sep 2020 05:55 )    Color: Yellow / Appearance: Clear / S.015 / pH: x  Gluc: x / Ketone: Negative  / Bili: Negative / Urobili: Negative mg/dL   Blood: x / Protein: Negative mg/dL / Nitrite: Negative   Leuk Esterase: Negative / RBC: x / WBC x   Sq Epi: x / Non Sq Epi: x / Bacteria: x            RADIOLOGY & ADDITIONAL STUDIES:  r  
      CHIEF COMPLAINT:    HPI:    76 y/o F w/ PMH of a-fib (on Eliquis), COPD (not on Home O2), dyslipidemia, breast cancer, PPM placement, p/w SOB. Patient states she chronically has SOB 2/2 COPD, but it is usually on exertion only. On Friday she started to develop SOB even at rest. Also developed some chest tightness, where she felt like her chest was being compressed. Denies radiation to arm  / jaw / neck. After her PPM was checked, she was told she had low battery and EP referred patient to ED for replacement. Patient was also told SOB is caused by low battery in her PPM as well.     States that CP has resolved at this time, and breathing is also at baseline.     Patient also c/o hoarness in her voice and sore throat that she developed today. Denies fever, chills, cough, runny nose, wheezin, nausea, vomiting, abdominal pain, diarrhea.     She had a Lexiscan MPI in office in several weeks ago that was negative for ischemia. EKG on admission showed V pacing with rate 65, but pt went into AF now with VR 80's. She has not received her meds. She is on Cardizem  mg qam and 60 mg qhs, Toprol XL 25 mg bid, and Benazepril 20 mg qam(I will hold this). Will get echo. PM battery change tomorrow.      PSH: L mastectomy, R knee arthroscopy, PPM placement, appendectomy     Social Hx: Tobacco - quit 14 years ago  (used to smoke for 40 years), Etoh - Quit 36 years ago, Drugs - Denies     Family Hx: Father - pancreatic cancer, arrhythmia. Mother -  at 94 y/o, CVA?   (29 Sep 2020 00:52)      PAST MEDICAL & SURGICAL HISTORY:  Hyperlipidemia, unspecified hyperlipidemia type    Malignant neoplasm of left female breast, unspecified estrogen receptor status, unspecified site of breast    Chronic obstructive pulmonary disease, unspecified COPD type    Hypertension, unspecified type    Afib    Pacemaker    S/P right knee arthroscopy    Artificial pacemaker    H/O mastectomy, left        Allergies    No Known Allergies    Intolerances    Mobic (Other)      Occupation:  Alochol: Denied  Smoking: Denied  Drug Use: Denied  Marital Status:         FAMILY HISTORY:  No pertinent family history in first degree relatives        REVIEW OF SYSTEMS:    CONSTITUTIONAL: No weakness, fevers or chills  EYES/ENT: No visual changes;  No vertigo or throat pain   NECK: No pain or stiffness  RESPIRATORY: No cough, wheezing, hemoptysis; No shortness of breath  CARDIOVASCULAR: No chest pain or palpitations  GASTROINTESTINAL: No abdominal or epigastric pain. No nausea, vomiting, or hematemesis; No diarrhea or constipation. No melena or hematochezia.  GENITOURINARY: No dysuria, frequency or hematuria  NEUROLOGICAL: No numbness or weakness  SKIN: No itching, burning, rashes, or lesions   All other review of systems is negative unless indicated above    Vital Signs Last 24 Hrs  T(C): 35.9 (29 Sep 2020 04:33), Max: 36.8 (28 Sep 2020 20:02)  T(F): 96.6 (29 Sep 2020 04:33), Max: 98.3 (28 Sep 2020 20:02)  HR: 73 (29 Sep 2020 08:00) (60 - 85)  BP: 137/71 (29 Sep 2020 08:00) (102/47 - 142/100)  BP(mean): 87 (29 Sep 2020 08:00) (61 - 113)  RR: 18 (29 Sep 2020 08:00) (17 - 28)  SpO2: 97% (29 Sep 2020 08:00) (95% - 99%)    I&O's Summary    28 Sep 2020 07:01  -  29 Sep 2020 07:00  --------------------------------------------------------  IN: 0 mL / OUT: 200 mL / NET: -200 mL        PHYSICAL EXAM:    Constitutional: NAD, awake and alert, well-developed  HEENT: PERR, EOMI,  No oral cyananosis.  Neck:  supple,  No JVD  Respiratory: Breath sounds are clear bilaterally, No wheezing, rales or rhonchi  Cardiovascular: S1 and S2, regular rate and rhythm, no Murmurs, gallops or rubs  Gastrointestinal: Bowel Sounds present, soft, nontender.   Extremities: No peripheral edema. No clubbing or cyanosis.  Vascular: 2+ peripheral pulses  Neurological: A/O x 3, no focal deficits  Musculoskeletal: no calf tenderness.  Skin: No rashes.    MEDICATIONS:  MEDICATIONS  (STANDING):  atorvastatin 40 milliGRAM(s) Oral at bedtime  budesonide 160 MICROgram(s)/formoterol 4.5 MICROgram(s) Inhaler 2 Puff(s) Inhalation two times a day  cholecalciferol 1000 Unit(s) Oral daily  diltiazem    milliGRAM(s) Oral daily  influenza   Vaccine 0.5 milliLiter(s) IntraMuscular once  metoprolol succinate ER 25 milliGRAM(s) Oral two times a day  multivitamin 1 Tablet(s) Oral daily  tiotropium 18 MICROgram(s) Capsule 1 Capsule(s) Inhalation daily      LABS: All Labs Reviewed:                        15.9   9.33  )-----------( 247      ( 29 Sep 2020 07:01 )             48.3                         15.7   12.78 )-----------( 269      ( 28 Sep 2020 20:47 )             45.9     29 Sep 2020 07:01    143    |  111    |  19     ----------------------------<  104    5.2     |  27     |  1.19   28 Sep 2020 20:47    142    |  113    |  24     ----------------------------<  104    4.5     |  23     |  1.41     Ca    9.8        29 Sep 2020 07:01  Ca    9.5        28 Sep 2020 20:47  Phos  4.0       29 Sep 2020 07:01  Mg     2.6       29 Sep 2020 07:01    TPro  7.2    /  Alb  3.8    /  TBili  0.7    /  DBili  x      /  AST  13     /  ALT  23     /  AlkPhos  79     29 Sep 2020 07:01  TPro  7.2    /  Alb  3.9    /  TBili  0.5    /  DBili  x      /  AST  14     /  ALT  24     /  AlkPhos  73     28 Sep 2020 20:47    PT/INR - ( 29 Sep 2020 07:01 )   PT: 13.3 sec;   INR: 1.15 ratio         PTT - ( 29 Sep 2020 07:01 )  PTT:36.2 sec  CARDIAC MARKERS ( 28 Sep 2020 22:53 )  <0.015 ng/mL / x     / x     / x     / x      CARDIAC MARKERS ( 28 Sep 2020 20:47 )  <0.015 ng/mL / x     / x     / x     / x          Blood Culture:         RADIOLOGY/EKG:    
HPI:  76 yo female w/ PMH of HLD, breast cancer, COPD, PAF (on Eliquis) with PPM placement, p/w SOB. Patient states she chronically has SOB 2/2 COPD, but it is usually on exertion only. On Friday she started to develop SOB even at rest. Also developed some chest tightness, where she felt like her chest was being compressed. Denies radiation to arm  / jaw / neck.   She sent a remote PPM report with showed that device at [facility] since 20 and pacing mode switch to [id number], which pt may be symptomatic from    PAST MEDICAL & SURGICAL HISTORY:  Hyperlipidemia, unspecified hyperlipidemia type    Malignant neoplasm of left female breast, unspecified estrogen receptor status, unspecified site of breast    Chronic obstructive pulmonary disease, unspecified COPD type    Hypertension, unspecified type    Afib    Pacemaker    S/P right knee arthroscopy    Artificial pacemaker    H/O mastectomy, left        MEDICATIONS  (STANDING):  atorvastatin 40 milliGRAM(s) Oral at bedtime  budesonide 160 MICROgram(s)/formoterol 4.5 MICROgram(s) Inhaler 2 Puff(s) Inhalation two times a day  cholecalciferol 1000 Unit(s) Oral daily  diltiazem    Tablet 60 milliGRAM(s) Oral daily  diltiazem    milliGRAM(s) Oral daily  furosemide   Injectable 40 milliGRAM(s) IV Push daily  influenza   Vaccine 0.5 milliLiter(s) IntraMuscular once  metoprolol succinate ER 25 milliGRAM(s) Oral two times a day  multivitamin 1 Tablet(s) Oral daily  tiotropium 18 MICROgram(s) Capsule 1 Capsule(s) Inhalation daily    MEDICATIONS  (PRN):  ALBUTerol    90 MICROgram(s) HFA Inhaler 2 Puff(s) Inhalation every 4 hours PRN for shortness of breath and/or wheezing  sodium chloride 0.65% Nasal 1 Spray(s) Nasal every 2 hours PRN dry nasal passages  zolpidem 5 milliGRAM(s) Oral at bedtime PRN Insomnia  zolpidem 5 milliGRAM(s) Oral at bedtime PRN Insomnia      Allergies    No Known Allergies    Intolerances    Mobic (Other)      SOCIAL HISTORY: denies smoking drinking or illicit drug use.       Vital Signs Last 24 Hrs  T(C): 36.5 (29 Sep 2020 09:18), Max: 36.8 (28 Sep 2020 20:02)  T(F): 97.7 (29 Sep 2020 09:18), Max: 98.3 (28 Sep 2020 20:02)  HR: 89 (29 Sep 2020 14:00) (60 - 97)  BP: 126/84 (29 Sep 2020 14:00) (102/47 - 142/100)  BP(mean): 92 (29 Sep 2020 14:00) (61 - 113)  RR: 26 (29 Sep 2020 12:01) (17 - 28)  SpO2: 94% (29 Sep 2020 14:00) (94% - 99%)      CONSTITUTIONAL: No fever, weight loss, chills, shakes, or fatigue  EYES: No eye pain, visual disturbances, or discharge  ENMT:  No difficulty hearing, tinnitus, vertigo; No sinus or throat pain  RESPIRATORY: No cough, wheezing, hemoptysis, or shortness of breath  CARDIOVASCULAR: dyspnea, chest tightness, No chest pain, palpitations, dizziness, syncope  GASTROINTESTINAL: No abdominal  pain, nausea, vomiting, diarrhea, constipation, melena or bright red blood.  GENITOURINARY: No dysuria, nocturia, hematuria, or urinary incontinence  NEUROLOGICAL: No headaches, memory loss, slurred speech, limb weakness, loss of strength, numbness, or tremors  SKIN: No itching, burning, rashes, or lesions   ENDOCRINE: No heat or cold intolerance, or hair loss  MUSCULOSKELETAL: No joint pain or swelling, muscle, back, or extremity pain  PSYCHIATRIC: No depression, anxiety, or difficulty sleeping  HEME/LYMPH: No easy bruising or bleeding gums  ALLERY AND IMMUNOLOGIC: No hives or rash.    PHYSICAL EXAMINATION:    Constitutional: NAD, awake and alert, well-developed  HEENT: PERR, EOMI,  No oral cyananosis.  Neck:  supple,  No JVD  Respiratory: Breath sounds are clear bilaterally, No wheezing, rales or rhonchi  Cardiovascular: S1 and S2, regular rate and rhythm, no Murmurs, gallops or rubs  Gastrointestinal: Bowel Sounds present, soft, nontender.   Extremities: No peripheral edema. No clubbing or cyanosis.  Vascular: 2+ peripheral pulses  Neurological: A/O x 3, no focal deficits  Musculoskeletal: no calf tenderness.  Skin: No rashes.      LABS:                        15.9   9.33  )-----------( 247      ( 29 Sep 2020 07:01 )             48.3   -    143  |  111<H>  |  19  ----------------------------<  104<H>  5.2   |  27  |  1.19    Ca    9.8      29 Sep 2020 07:01  Phos  4.0       Mg     2.6         TPro  7.2  /  Alb  3.8  /  TBili  0.7  /  DBili  x   /  AST  13<L>  /  ALT  23  /  AlkPhos  79    LIVER FUNCTIONS - ( 29 Sep 2020 07:01 )  Alb: 3.8 g/dL / Pro: 7.2 gm/dL / ALK PHOS: 79 U/L / ALT: 23 U/L / AST: 13 U/L / GGT: x           PT/INR - ( 29 Sep 2020 07:01 )   PT: 13.3 sec;   INR: 1.15 ratio         PTT - ( 29 Sep 2020 07:01 )  PTT:36.2 secCARDIAC MARKERS ( 28 Sep 2020 22:53 )  <0.015 ng/mL / x     / x     / x     / x      CARDIAC MARKERS ( 28 Sep 2020 20:47 )  <0.015 ng/mL / x     / x     / x     / x          Urinalysis Basic - ( 29 Sep 2020 05:55 )    Color: Yellow / Appearance: Clear / S.015 / pH: x  Gluc: x / Ketone: Negative  / Bili: Negative / Urobili: Negative mg/dL   Blood: x / Protein: Negative mg/dL / Nitrite: Negative   Leuk Esterase: Negative / RBC: x / WBC x   Sq Epi: x / Non Sq Epi: x / Bacteria: x        EK20 afib with v-pacing rate 65bpm    TELEMETRY: afib in 80-90 with episodic v-pacing at 65bpm    CARDIAC TESTS:  echo pending

## 2020-09-29 NOTE — CONSULT NOTE ADULT - ASSESSMENT
76 y/o F w/ PMH of a-fib (on Eliquis), COPD (not on Home O2), dyslipidemia, breast cancer, PPM placement, p/w SOB. Patient states she chronically has SOB 2/2 COPD, but it is usually on exertion only. On Friday she started to develop SOB even at rest. Also developed some chest tightness, where she felt like her chest was being compressed. Denies radiation to arm  / jaw / neck. After her PPM was checked, she was told she had low battery and EP referred patient to ED for replacement. Patient was also told SOB is caused by low battery in her PPM as well.     States that CP has resolved at this time, and breathing is also at baseline.     Patient also c/o hoarsens in her voice and sore throat that she developed today. Denies fever, chills, cough, runny nose, wheezin, nausea, vomiting, abdominal pain, diarrhea.     pt is well known to me  she feels better but needs pacemaker change  seen along with her RN today    Assessment / plan:  A fib on AC  PPM with frequent PVCs  COPD appears stable  no active bronchospasm / no consolidations  cardiology eval ion progress  will need PPM change done  will review outpt data and ct images done  
74 y/o F w/ PMH of a-fib (on Eliquis), COPD (not on Home O2), dyslipidemia, breast cancer, PPM placement, p/w SOB      *Chest Tightness / Dyspnea   -As per ED physician, case was discussed w/ EP on-call, SOB attributed to low battery in PPM. Recommended to admit to CICU w/ external pacer in place  -Will hold any rate-lowering medications temporarily (BB/cardizem)  -EP consult   -NPO after MN   -Hold Eliquis for procedure in AM  -Troponin negative x 2   -Patient states she recently had NST on 9/14/20, and was told it was negative  -ECHO  -restart Cardizem CD and Toprol XL. Hold benazepril      *Mild Leukocytosis likely 2/2 URI   -RVP  -Albuterol PRN  -COVID negative   -UA pending  -F/u CXR    *Suspect JORGE vs CKD?   -Recent baseline creatinine unavailable, last known creatinine on [place] is 0.96 5/2018. Patient states she has been told that she "has high BUN" before.   -Re-check creatinine in AM for improvement  -I/Os   -Avoid nephrotoxic agents  -Will hold ACEi temporarily      *Advance Directives   -HCP is her friend Andreina. Patient wishes to have cardiac resuscitation and intubation / mechanical ventilation if necessary, and is Full Code. Advance care planning time <30 minutes     *DVT ppx  
74 yo female with HLD, COPD, h/p breast cancer, PAF on toprol and cardizem s/p PPM implant, presented with c/p sob and chest tightness for  1week and noted with PPM at [facility] since 9/12/20.   pro-BNP on admission elevated at 1500, also noted to be in afib with rates 80-90bpm.  In the past she is aware of afib but not severely symptomatic and usually afib episodes last only couple of hours at a time.    Shortness of breath and heart failure is likely related to pacemaker syndrome due to loss of atrioventricular synchrony after device reached [facility] and switched to VVI pacing.   Plan:  IV lasix dose today and tomorrow  hold eliquis  NPO after midnight for generator replacement tomorrow morning.

## 2020-09-29 NOTE — H&P ADULT - ASSESSMENT
76 y/o F w/ PMH of a-fib (on Eliquis), COPD (not on Home O2), dyslipidemia, breast cancer, PPM placement, p/w SOB      *Chest Tightness / Dyspnea   -As per ED physician, case was discussed w/ EP on-call, SOB attributed to low battery in PPM. Recommended to admit to CICU w/ external pacer in place  -Will hold any rate-lowering medications temporarily (BB/cardizem)  -EP consult   -NPO after MN   -Hold Eliquis for procedure in AM  -Troponin negative x 2   -Patient states she recently had NST on 9/14/20, and was told it was negative  -Cardio consult       *Mild Leukocytosis likely 2/2 URI   -Zithromax   -RVP  -Albuterol PRN  -COVID negative   -UA pending  -F/u CXR    *Suspect JORGE vs CKD?   -Recent baseline creatinine unavailable, last known creatinine on [place] is 0.96 5/2018. Patient states she has been told that she "has high BUN" before.   -Will give IVF and re-check creatinine in AM for improvement  -I/Os   -Avoid nephrotoxic agents  -Will hold ACEi temporarily      *Advance Directives   -HCP is her friend Andreina. Patient wishes to have cardiac resuscitation and intubation / mechanical ventilation if necessary, and is Full Code. Advance care planning time <30 minutes     *DVT ppx  -SCDs   74 y/o F w/ PMH of a-fib (on Eliquis), COPD (not on Home O2), dyslipidemia, breast cancer, PPM placement, p/w SOB      *Chest Tightness / Dyspnea   -As per ED physician, case was discussed w/ EP on-call, SOB attributed to low battery in PPM. Recommended to admit to CICU w/ external pacer in place  -Will hold any rate-lowering medications temporarily (BB/cardizem)  -EP consult   -NPO after MN   -Hold Eliquis for procedure in AM  -Troponin negative x 2   -Patient states she recently had NST on 9/14/20, and was told it was negative  -Cardio consult       *Mild Leukocytosis likely 2/2 URI   -RVP  -Albuterol PRN  -COVID negative   -UA pending  -F/u CXR    *Suspect JORGE vs CKD?   -Recent baseline creatinine unavailable, last known creatinine on [place] is 0.96 5/2018. Patient states she has been told that she "has high BUN" before.   -Re-check creatinine in AM for improvement  -I/Os   -Avoid nephrotoxic agents  -Will hold ACEi temporarily      *Advance Directives   -HCP is her friend Andreina. Patient wishes to have cardiac resuscitation and intubation / mechanical ventilation if necessary, and is Full Code. Advance care planning time <30 minutes     *DVT ppx  -SCDs

## 2020-09-30 PROCEDURE — 33228 REMV&REPLC PM GEN DUAL LEAD: CPT | Mod: KX

## 2020-09-30 PROCEDURE — 99232 SBSQ HOSP IP/OBS MODERATE 35: CPT

## 2020-09-30 PROCEDURE — 93010 ELECTROCARDIOGRAM REPORT: CPT

## 2020-09-30 RX ORDER — CEFAZOLIN SODIUM 1 G
1000 VIAL (EA) INJECTION EVERY 8 HOURS
Refills: 0 | Status: COMPLETED | OUTPATIENT
Start: 2020-09-30 | End: 2020-10-01

## 2020-09-30 RX ORDER — CEFAZOLIN SODIUM 1 G
1000 VIAL (EA) INJECTION EVERY 8 HOURS
Refills: 0 | Status: DISCONTINUED | OUTPATIENT
Start: 2020-09-30 | End: 2020-09-30

## 2020-09-30 RX ORDER — APIXABAN 2.5 MG/1
5 TABLET, FILM COATED ORAL EVERY 12 HOURS
Refills: 0 | Status: DISCONTINUED | OUTPATIENT
Start: 2020-10-01 | End: 2020-10-01

## 2020-09-30 RX ORDER — CEFAZOLIN SODIUM 1 G
2000 VIAL (EA) INJECTION ONCE
Refills: 0 | Status: COMPLETED | OUTPATIENT
Start: 2020-09-30 | End: 2020-09-30

## 2020-09-30 RX ADMIN — Medication 1000 MILLIGRAM(S): at 16:35

## 2020-09-30 RX ADMIN — TIOTROPIUM BROMIDE 1 CAPSULE(S): 18 CAPSULE ORAL; RESPIRATORY (INHALATION) at 08:42

## 2020-09-30 RX ADMIN — Medication 25 MILLIGRAM(S): at 22:20

## 2020-09-30 RX ADMIN — Medication 60 MILLIGRAM(S): at 22:20

## 2020-09-30 RX ADMIN — Medication 100 MILLIGRAM(S): at 11:09

## 2020-09-30 RX ADMIN — ATORVASTATIN CALCIUM 40 MILLIGRAM(S): 80 TABLET, FILM COATED ORAL at 21:38

## 2020-09-30 RX ADMIN — Medication 300 MILLIGRAM(S): at 09:29

## 2020-09-30 RX ADMIN — BUDESONIDE AND FORMOTEROL FUMARATE DIHYDRATE 2 PUFF(S): 160; 4.5 AEROSOL RESPIRATORY (INHALATION) at 08:48

## 2020-09-30 RX ADMIN — Medication 40 MILLIGRAM(S): at 06:02

## 2020-09-30 RX ADMIN — BUDESONIDE AND FORMOTEROL FUMARATE DIHYDRATE 2 PUFF(S): 160; 4.5 AEROSOL RESPIRATORY (INHALATION) at 20:31

## 2020-09-30 RX ADMIN — Medication 25 MILLIGRAM(S): at 09:29

## 2020-09-30 NOTE — CDI QUERY NOTE - NSCDIOTHERTXTBX3_GEN_ALL_CORE_FT
Patient with AFIB  noted to be in afib with rates 80-90bpm    Please clarify the type of Afib  a) Chronic  b) Persistent  c) Paroxysmal  d) Permanent  e) Other

## 2020-09-30 NOTE — CDI QUERY NOTE - NSCDIOTHERTXTBX_GEN_ALL_CORE_HH
Patient admitted with SOB and chest tightness  Noted to Need PPM generator change    Cardiologist documented : Shortness of breath and heart failure is likely related to pacemaker syndrome due to loss of atrioventricular synchrony after device reached [facility] and switched to VVI pacing.   Plan: IV lasix  40mg given x 2  BNP= 1528  EF= 60-65 %    Please clarify if the above is indicative of a further diagnosis  a) Acute diastolic CHF due to pacemaker RAMU  b) Acute on chronic diastolic CHF  c) No clinical evidence of CHF, no history of CHF  d) Other, please clarify

## 2020-09-30 NOTE — PROGRESS NOTE ADULT - SUBJECTIVE AND OBJECTIVE BOX
CHIEF COMPLAINT:    HPI:    76 y/o F w/ PMH of a-fib (on Eliquis), COPD (not on Home O2), dyslipidemia, breast cancer, PPM placement, p/w SOB. Patient states she chronically has SOB 2/2 COPD, but it is usually on exertion only. On Friday she started to develop SOB even at rest. Also developed some chest tightness, where she felt like her chest was being compressed. Denies radiation to arm  / jaw / neck. After her PPM was checked, she was told she had low battery and EP referred patient to ED for replacement. Patient was also told SOB is caused by low battery in her PPM as well.     States that CP has resolved at this time, and breathing is also at baseline.     Patient also c/o hoarness in her voice and sore throat that she developed today. Denies fever, chills, cough, runny nose, wheezin, nausea, vomiting, abdominal pain, diarrhea.     She had a Lexiscan MPI in office in several weeks ago that was negative for ischemia. EKG on admission showed V pacing with rate 65, but pt went into AF now with VR 80's. She has not received her meds. She is on Cardizem  mg qam and 60 mg qhs, Toprol XL 25 mg bid, and Benazepril 20 mg qam(I will hold this). Will get echo. PM battery change tomorrow.    : pt converted to sinus rhythm. PM battery change today.      PSH: L mastectomy, R knee arthroscopy, PPM placement, appendectomy     Social Hx: Tobacco - quit 14 years ago  (used to smoke for 40 years), Etoh - Quit 36 years ago, Drugs - Denies     Family Hx: Father - pancreatic cancer, arrhythmia. Mother -  at 92 y/o, CVA?   (29 Sep 2020 00:52)      PAST MEDICAL & SURGICAL HISTORY:  Hyperlipidemia, unspecified hyperlipidemia type    Malignant neoplasm of left female breast, unspecified estrogen receptor status, unspecified site of breast    Chronic obstructive pulmonary disease, unspecified COPD type    Hypertension, unspecified type    Afib    Pacemaker    S/P right knee arthroscopy    Artificial pacemaker    H/O mastectomy, left        Allergies    No Known Allergies    Intolerances    Mobic (Other)      Occupation:  Alochol: Denied  Smoking: Denied  Drug Use: Denied  Marital Status:         FAMILY HISTORY:  No pertinent family history in first degree relatives        REVIEW OF SYSTEMS:    CONSTITUTIONAL: No weakness, fevers or chills  EYES/ENT: No visual changes;  No vertigo or throat pain   NECK: No pain or stiffness  RESPIRATORY: No cough, wheezing, hemoptysis; No shortness of breath  CARDIOVASCULAR: No chest pain or palpitations  GASTROINTESTINAL: No abdominal or epigastric pain. No nausea, vomiting, or hematemesis; No diarrhea or constipation. No melena or hematochezia.  GENITOURINARY: No dysuria, frequency or hematuria  NEUROLOGICAL: No numbness or weakness  SKIN: No itching, burning, rashes, or lesions   All other review of systems is negative unless indicated above    Vital Signs Last 24 Hrs  T(C): 35.9 (29 Sep 2020 04:33), Max: 36.8 (28 Sep 2020 20:02)  T(F): 96.6 (29 Sep 2020 04:33), Max: 98.3 (28 Sep 2020 20:02)  HR: 73 (29 Sep 2020 08:00) (60 - 85)  BP: 137/71 (29 Sep 2020 08:00) (102/47 - 142/100)  BP(mean): 87 (29 Sep 2020 08:00) (61 - 113)  RR: 18 (29 Sep 2020 08:00) (17 - 28)  SpO2: 97% (29 Sep 2020 08:00) (95% - 99%)    I&O's Summary    28 Sep 2020 07:01  -  29 Sep 2020 07:00  --------------------------------------------------------  IN: 0 mL / OUT: 200 mL / NET: -200 mL        PHYSICAL EXAM:    Constitutional: NAD, awake and alert, well-developed  HEENT: PERR, EOMI,  No oral cyananosis.  Neck:  supple,  No JVD  Respiratory: Breath sounds are clear bilaterally, No wheezing, rales or rhonchi  Cardiovascular: S1 and S2, regular rate and rhythm, no Murmurs, gallops or rubs  Gastrointestinal: Bowel Sounds present, soft, nontender.   Extremities: No peripheral edema. No clubbing or cyanosis.  Vascular: 2+ peripheral pulses  Neurological: A/O x 3, no focal deficits  Musculoskeletal: no calf tenderness.  Skin: No rashes.    MEDICATIONS:  MEDICATIONS  (STANDING):  atorvastatin 40 milliGRAM(s) Oral at bedtime  budesonide 160 MICROgram(s)/formoterol 4.5 MICROgram(s) Inhaler 2 Puff(s) Inhalation two times a day  cholecalciferol 1000 Unit(s) Oral daily  diltiazem    milliGRAM(s) Oral daily  influenza   Vaccine 0.5 milliLiter(s) IntraMuscular once  metoprolol succinate ER 25 milliGRAM(s) Oral two times a day  multivitamin 1 Tablet(s) Oral daily  tiotropium 18 MICROgram(s) Capsule 1 Capsule(s) Inhalation daily      LABS: All Labs Reviewed:                        15.9   9.33  )-----------( 247      ( 29 Sep 2020 07:01 )             48.3                         15.7   12.78 )-----------( 269      ( 28 Sep 2020 20:47 )             45.9     29 Sep 2020 07:01    143    |  111    |  19     ----------------------------<  104    5.2     |  27     |  1.19   28 Sep 2020 20:47    142    |  113    |  24     ----------------------------<  104    4.5     |  23     |  1.41     Ca    9.8        29 Sep 2020 07:01  Ca    9.5        28 Sep 2020 20:47  Phos  4.0       29 Sep 2020 07:01  Mg     2.6       29 Sep 2020 07:01    TPro  7.2    /  Alb  3.8    /  TBili  0.7    /  DBili  x      /  AST  13     /  ALT  23     /  AlkPhos  79     29 Sep 2020 07:01  TPro  7.2    /  Alb  3.9    /  TBili  0.5    /  DBili  x      /  AST  14     /  ALT  24     /  AlkPhos  73     28 Sep 2020 20:47    PT/INR - ( 29 Sep 2020 07:01 )   PT: 13.3 sec;   INR: 1.15 ratio         PTT - ( 29 Sep 2020 07:01 )  PTT:36.2 sec  CARDIAC MARKERS ( 28 Sep 2020 22:53 )  <0.015 ng/mL / x     / x     / x     / x      CARDIAC MARKERS ( 28 Sep 2020 20:47 )  <0.015 ng/mL / x     / x     / x     / x          Blood Culture:         RADIOLOGY/EKG:

## 2020-09-30 NOTE — PROGRESS NOTE ADULT - ASSESSMENT
74 y/o F w/ PMH of a-fib (on Eliquis), COPD (not on Home O2), dyslipidemia, breast cancer, PPM placement, p/w SOB. Patient states she chronically has SOB 2/2 COPD, but it is usually on exertion only. On Friday she started to develop SOB even at rest. Also developed some chest tightness, where she felt like her chest was being compressed. Denies radiation to arm  / jaw / neck. After her PPM was checked, she was told she had low battery and EP referred patient to ED for replacement. Patient was also told SOB is caused by low battery in her PPM as well.     States that CP has resolved at this time, and breathing is also at baseline.     Patient also c/o hoarsens in her voice and sore throat that she developed today. Denies fever, chills, cough, runny nose, wheezin, nausea, vomiting, abdominal pain, diarrhea.     pt is well known to me  she feels better but needs pacemaker change  seen along with her RN today    Assessment / plan:  A fib on AC  PPM with frequent PVCs  COPD appears stable  no active bronchospasm / no consolidations  cardiology eval ion progress  will need PPM change, scheduled for today  will review outpt data and ct images done  fu with Dr VALENTINE ramirez outpt, Thank you

## 2020-09-30 NOTE — PACU DISCHARGE NOTE - COMMENTS
Report given to  RN CICU. Verified with her that the patient is on telemetry monitoring. Home monitor unit sent with the patient. Left the unit at 1311 to go back to CICU. Report given to Celine   RN CICU. Verified with her that the patient is on telemetry monitoring. Home monitor unit sent with the patient. Left the unit at 1311 to go back to CICU.

## 2020-09-30 NOTE — PROGRESS NOTE ADULT - ASSESSMENT
74 y/o F w/ PMH of a-fib (on Eliquis), COPD (not on Home O2), dyslipidemia, breast cancer, PPM placement, p/w SOB      *Chest Tightness / Dyspnea   -As per ED physician, case was discussed w/ EP on-call, SOB attributed to low battery in PPM. Recommended to admit to CICU w/ external pacer in place  -Will hold any rate-lowering medications temporarily (BB/cardizem)  -EP consult   -NPO after MN   -Hold Eliquis for procedure in AM  -Troponin negative x 2   -Patient states she recently had NST on 9/14/20, and was told it was negative  -ECHO  -restart Cardizem CD and Toprol XL. Hold benazepril      *Mild Leukocytosis likely 2/2 URI   -RVP  -Albuterol PRN  -COVID negative   -UA pending  -F/u CXR    *Suspect JORGE vs CKD?   -Recent baseline creatinine unavailable, last known creatinine on [place] is 0.96 5/2018. Patient states she has been told that she "has high BUN" before.   -Re-check creatinine in AM for improvement  -I/Os   -Avoid nephrotoxic agents  -Will hold ACEi temporarily      *Advance Directives   -HCP is her friend Andreina. Patient wishes to have cardiac resuscitation and intubation / mechanical ventilation if necessary, and is Full Code. Advance care planning time <30 minutes     *DVT ppx

## 2020-09-30 NOTE — CDI QUERY NOTE - NSCDIOTHERTXTBX2_GEN_ALL_CORE_FT
Documentation reveals : *Suspect JORGE vs CKD?    last known creatinine on [place] is 0.96 5/2018.    Creatinine Trend:  Creatinine, Serum: 1.19 mg/dL [0.50 - 1.30] (09-29-20)  Creatinine, Serum: 1.41 mg/dL <H> [0.50 - 1.30] (09-28-20)    KDIGO criteria has been met    Please clarify if patient has JORGE or CKD  a) JORGE,  poa  b) JORGE on CKD, poa  c) CKD alone  d) No clinical evidence of JORGE or CKD

## 2020-09-30 NOTE — PROGRESS NOTE ADULT - SUBJECTIVE AND OBJECTIVE BOX
Patient is a 75y old  Female who presents with a chief complaint of SOB (29 Sep 2020 00:52)      HPI:    74 y/o F w/ PMH of a-fib (on Eliquis), COPD (not on Home O2), dyslipidemia, breast cancer, PPM placement, p/w SOB. Patient states she chronically has SOB 2/2 COPD, but it is usually on exertion only. On Friday she started to develop SOB even at rest. Also developed some chest tightness, where she felt like her chest was being compressed. Denies radiation to arm  / jaw / neck. After her PPM was checked, she was told she had low battery and EP referred patient to ED for replacement. Patient was also told SOB is caused by low battery in her PPM as well.     States that CP has resolved at this time, and breathing is also at baseline.     Patient also c/o hoarness in her voice and sore throat that she developed today. Denies fever, chills, cough, runny nose, wheezin, nausea, vomiting, abdominal pain, diarrhea.     pt is well known to me  she feels better but needs pacemaker change  seen along with her RN today    PSH: L mastectomy, R knee arthroscopy, PPM placement, appendectomy     Social Hx: Tobacco - quit 14 years ago  (used to smoke for 40 years), Etoh - Quit 36 years ago, Drugs - Denies     Family Hx: Father - pancreatic cancer, arrhythmia. Mother -  at 92 y/o, CVA?   (29 Sep 2020 00:52)      PAST MEDICAL & SURGICAL HISTORY:  Hyperlipidemia, unspecified hyperlipidemia type    Malignant neoplasm of left female breast, unspecified estrogen receptor status, unspecified site of breast    Chronic obstructive pulmonary disease, unspecified COPD type    Hypertension, unspecified type    Afib    Pacemaker    S/P right knee arthroscopy    Artificial pacemaker    H/O mastectomy, left        PREVIOUS DIAGNOSTIC TESTING:      MEDICATIONS  (STANDING):  atorvastatin 40 milliGRAM(s) Oral at bedtime  budesonide 160 MICROgram(s)/formoterol 4.5 MICROgram(s) Inhaler 2 Puff(s) Inhalation two times a day  cholecalciferol 1000 Unit(s) Oral daily  influenza   Vaccine 0.5 milliLiter(s) IntraMuscular once  multivitamin 1 Tablet(s) Oral daily  tiotropium 18 MICROgram(s) Capsule 1 Capsule(s) Inhalation daily    MEDICATIONS  (PRN):  ALBUTerol    90 MICROgram(s) HFA Inhaler 2 Puff(s) Inhalation every 4 hours PRN for shortness of breath and/or wheezing  sodium chloride 0.65% Nasal 1 Spray(s) Nasal every 2 hours PRN dry nasal passages  zolpidem 5 milliGRAM(s) Oral at bedtime PRN Insomnia  zolpidem 5 milliGRAM(s) Oral at bedtime PRN Insomnia      FAMILY HISTORY:  No pertinent family history in first degree relatives        SOCIAL HISTORY:  ***    REVIEW OF SYSTEM:  Pertinent items are noted in HPI.  Constitutional negative for chills, fevers, sweats and weight loss  throat, and face:  negative for epistaxis, nasal congestion, sore throat and   tinnitus  Respiratory: negative for cough, dyspnea on exertion, pleuritic chest pain  and wheezing  Cardiovascular:  negative for chest pain,pos dyspnea and palpitations  Gastrointestinal: negative for abdominal pain, diarrhea, nausea and vomiting  Genitourinary: negative for dysuria, frequency and urinary incontinence  Skin:  negative for redness, rash, pruritus, swelling, dryness and   fissures  Hematologic/lymphatic: negative for bleeding and easy bruising  Musculoskeletal: negative for arthralgias, back pain and muscle weakness  Neurological: negative for dizziness, headaches, seizures and tremors  Behavioral/Psych:  negative for mood change, depression, suicidal attempts    Allergic/Immunologic: negative for anaphylaxis, angioedema and urticaria    Vital Signs Last 24 Hrs  T(C): 36.5 (30 Sep 2020 04:56), Max: 36.5 (29 Sep 2020 09:18)  T(F): 97.7 (30 Sep 2020 04:56), Max: 97.7 (29 Sep 2020 09:18)  HR: 77 (30 Sep 2020 07:00) (62 - 97)  BP: 122/63 (30 Sep 2020 06:00) (100/58 - 142/74)  BP(mean): 77 (30 Sep 2020 06:00) (68 - 101)  RR: 20 (30 Sep 2020 00:01) (17 - 28)  SpO2: 94% (30 Sep 2020 07:00) (90% - 96%)    PHYSICAL EXAM  General Appearance: cooperative, no acute distress,   HEENT: PERRL, conjunctiva clear, EOM's intact, non injected pharynx, no exudate, TM   normal  Neck: Supple, , no adenopathy, thyroid: not enlarged, no carotid bruit or JVD  Back: Symmetric, no  tenderness,no soft tissue tenderness  Lungs: Clear to auscultation bilateral,no adventitious breath sounds, normal   expiratory phase  Heart: Regular rate and rhythm, S1, S2 normal, no murmur, rub or gallop  Abdomen: Soft, non-tender, bowel sounds active , no hepatosplenomegaly  Extremities: no cyanosis or edema, no joint swelling  Skin: Skin color, texture normal, no rashes   Neurologic: Alert and oriented X3 , cranial nerves intact, sensory and motor normal,    ECG:    LABS:                          15.9   9.33  )-----------( 247      ( 29 Sep 2020 07:01 )             48.3         143  |  111<H>  |  19  ----------------------------<  104<H>  5.2   |  27  |  1.19    Ca    9.8      29 Sep 2020 07:01  Phos  4.0       Mg     2.6         TPro  7.2  /  Alb  3.8  /  TBili  0.7  /  DBili  x   /  AST  13<L>  /  ALT  23  /  AlkPhos  79      CARDIAC MARKERS ( 28 Sep 2020 22:53 )  <0.015 ng/mL / x     / x     / x     / x      CARDIAC MARKERS ( 28 Sep 2020 20:47 )  <0.015 ng/mL / x     / x     / x     / x            Pro BNP  --  @ 20:47  D Dimer  <150  @ 20:47    PT/INR - ( 29 Sep 2020 07:01 )   PT: 13.3 sec;   INR: 1.15 ratio         PTT - ( 29 Sep 2020 07:01 )  PTT:36.2 sec  Urinalysis Basic - ( 29 Sep 2020 05:55 )    Color: Yellow / Appearance: Clear / S.015 / pH: x  Gluc: x / Ketone: Negative  / Bili: Negative / Urobili: Negative mg/dL   Blood: x / Protein: Negative mg/dL / Nitrite: Negative   Leuk Esterase: Negative / RBC: x / WBC x   Sq Epi: x / Non Sq Epi: x / Bacteria: x    x< from: Xray Chest 1 View- PORTABLE-Urgent (Xray Chest 1 View- PORTABLE-Urgent .) (20 @ 20:43) >  EXAM:  XR CHEST PORTABLE URGENT 1V                            PROCEDURE DATE:  2020          INTERPRETATION:  AP erect chest on 2020 at 8:24 PM. Patient is short of breath.    Heart is magnified by technique. Left-sided pacemaker again noted.    The lung fields and pleural surfaces are unremarkable.    Chest is similar to May 15, 2018.    IMPRESSION: No acute finding.    < end of copied text >          RADIOLOGY & ADDITIONAL STUDIES:  r

## 2020-10-01 ENCOUNTER — TRANSCRIPTION ENCOUNTER (OUTPATIENT)
Age: 75
End: 2020-10-01

## 2020-10-01 VITALS — SYSTOLIC BLOOD PRESSURE: 129 MMHG | DIASTOLIC BLOOD PRESSURE: 62 MMHG | OXYGEN SATURATION: 97 % | HEART RATE: 78 BPM

## 2020-10-01 LAB
ANION GAP SERPL CALC-SCNC: 7 MMOL/L — SIGNIFICANT CHANGE UP (ref 5–17)
BASOPHILS # BLD AUTO: 0.08 K/UL — SIGNIFICANT CHANGE UP (ref 0–0.2)
BASOPHILS NFR BLD AUTO: 0.8 % — SIGNIFICANT CHANGE UP (ref 0–2)
BUN SERPL-MCNC: 30 MG/DL — HIGH (ref 7–23)
CALCIUM SERPL-MCNC: 9.5 MG/DL — SIGNIFICANT CHANGE UP (ref 8.5–10.1)
CHLORIDE SERPL-SCNC: 104 MMOL/L — SIGNIFICANT CHANGE UP (ref 96–108)
CO2 SERPL-SCNC: 26 MMOL/L — SIGNIFICANT CHANGE UP (ref 22–31)
CREAT SERPL-MCNC: 1.27 MG/DL — SIGNIFICANT CHANGE UP (ref 0.5–1.3)
EOSINOPHIL # BLD AUTO: 0.28 K/UL — SIGNIFICANT CHANGE UP (ref 0–0.5)
EOSINOPHIL NFR BLD AUTO: 2.8 % — SIGNIFICANT CHANGE UP (ref 0–6)
GLUCOSE SERPL-MCNC: 110 MG/DL — HIGH (ref 70–99)
HCT VFR BLD CALC: 46.4 % — HIGH (ref 34.5–45)
HGB BLD-MCNC: 15.6 G/DL — HIGH (ref 11.5–15.5)
IMM GRANULOCYTES NFR BLD AUTO: 0.4 % — SIGNIFICANT CHANGE UP (ref 0–1.5)
LYMPHOCYTES # BLD AUTO: 1.7 K/UL — SIGNIFICANT CHANGE UP (ref 1–3.3)
LYMPHOCYTES # BLD AUTO: 17.1 % — SIGNIFICANT CHANGE UP (ref 13–44)
MCHC RBC-ENTMCNC: 30.8 PG — SIGNIFICANT CHANGE UP (ref 27–34)
MCHC RBC-ENTMCNC: 33.6 GM/DL — SIGNIFICANT CHANGE UP (ref 32–36)
MCV RBC AUTO: 91.5 FL — SIGNIFICANT CHANGE UP (ref 80–100)
MONOCYTES # BLD AUTO: 1.46 K/UL — HIGH (ref 0–0.9)
MONOCYTES NFR BLD AUTO: 14.7 % — HIGH (ref 2–14)
NEUTROPHILS # BLD AUTO: 6.38 K/UL — SIGNIFICANT CHANGE UP (ref 1.8–7.4)
NEUTROPHILS NFR BLD AUTO: 64.2 % — SIGNIFICANT CHANGE UP (ref 43–77)
PLATELET # BLD AUTO: 228 K/UL — SIGNIFICANT CHANGE UP (ref 150–400)
POTASSIUM SERPL-MCNC: 4.1 MMOL/L — SIGNIFICANT CHANGE UP (ref 3.5–5.3)
POTASSIUM SERPL-SCNC: 4.1 MMOL/L — SIGNIFICANT CHANGE UP (ref 3.5–5.3)
RBC # BLD: 5.07 M/UL — SIGNIFICANT CHANGE UP (ref 3.8–5.2)
RBC # FLD: 13.3 % — SIGNIFICANT CHANGE UP (ref 10.3–14.5)
SODIUM SERPL-SCNC: 137 MMOL/L — SIGNIFICANT CHANGE UP (ref 135–145)
WBC # BLD: 9.94 K/UL — SIGNIFICANT CHANGE UP (ref 3.8–10.5)
WBC # FLD AUTO: 9.94 K/UL — SIGNIFICANT CHANGE UP (ref 3.8–10.5)

## 2020-10-01 PROCEDURE — 99239 HOSP IP/OBS DSCHRG MGMT >30: CPT

## 2020-10-01 PROCEDURE — 93010 ELECTROCARDIOGRAM REPORT: CPT

## 2020-10-01 RX ORDER — BENAZEPRIL HYDROCHLORIDE 40 MG/1
1 TABLET ORAL
Qty: 0 | Refills: 0 | DISCHARGE

## 2020-10-01 RX ADMIN — Medication 1 TABLET(S): at 09:57

## 2020-10-01 RX ADMIN — BUDESONIDE AND FORMOTEROL FUMARATE DIHYDRATE 2 PUFF(S): 160; 4.5 AEROSOL RESPIRATORY (INHALATION) at 09:31

## 2020-10-01 RX ADMIN — Medication 300 MILLIGRAM(S): at 09:56

## 2020-10-01 RX ADMIN — Medication 100 MILLIGRAM(S): at 05:32

## 2020-10-01 RX ADMIN — APIXABAN 5 MILLIGRAM(S): 2.5 TABLET, FILM COATED ORAL at 11:51

## 2020-10-01 RX ADMIN — Medication 1000 MILLIGRAM(S): at 01:06

## 2020-10-01 RX ADMIN — Medication 1000 UNIT(S): at 09:56

## 2020-10-01 RX ADMIN — Medication 25 MILLIGRAM(S): at 09:57

## 2020-10-01 RX ADMIN — TIOTROPIUM BROMIDE 1 CAPSULE(S): 18 CAPSULE ORAL; RESPIRATORY (INHALATION) at 09:34

## 2020-10-01 NOTE — PROVIDER CONTACT NOTE (OTHER) - SITUATION
Pt admitted with SOB, chest heaviness, negative stress test outpt, and intially in Aflutter/Afib. PPM in place, s/p generator change 9/30.

## 2020-10-01 NOTE — DISCHARGE NOTE PROVIDER - NSDCMRMEDTOKEN_GEN_ALL_CORE_FT
Advair  mcg-21 mcg/inh inhalation aerosol: 2 puff(s) inhaled 2 times a day  cholecalciferol 1000 intl units oral tablet: 2 tab(s) orally once a day  clindamycin 1% topical solution: Apply topically to affected area 2 times a day  ****Face****  CoQ10: 100 milligram(s) orally once a day  dilTIAZem 60 mg oral tablet: 1 tab(s) orally once a day (at bedtime)  DilTIAZem Hydrochloride  mg/24 hours oral capsule, extended release: 1 cap(s) orally once a day (in the morning)  Eliquis 5 mg oral tablet: 1 tab(s) orally 2 times a day        metoprolol succinate 25 mg oral tablet, extended release: 1 tab(s) orally 2 times a day  Multiple Vitamins oral tablet: 1 tab(s) orally once a day  ProAir HFA 90 mcg/inh inhalation aerosol: 2 puff(s) inhaled every 6 hours, As Needed - for shortness of breath and/or wheezing  rosuvastatin 10 mg oral tablet: 1 tab(s) orally once a day  Saline Nasal Mist 0.65% nasal solution: 2 drop(s) nasal every 2 hours, As Needed - for dry nasal passages  Spiriva 18 mcg inhalation capsule: 1 cap(s) inhaled once a day  zolpidem 10 mg oral tablet: 1 tab(s) orally once a day (at bedtime)

## 2020-10-01 NOTE — PROGRESS NOTE ADULT - SUBJECTIVE AND OBJECTIVE BOX
HPI:  76 yo female w/ PMH of HLD, breast cancer, COPD, PAF (on Eliquis) with PPM placement, p/w SOB. Patient states she chronically has SOB 2/2 COPD, but it is usually on exertion only. On Friday she started to develop SOB even at rest. Also developed some chest tightness, where she felt like her chest was being compressed. Denies radiation to arm  / jaw / neck.   She sent a remote PPM report with showed that device at [facility] since 9/12/20 and pacing mode switch to [id number], which pt may be symptomatic from      10/1/20: s/p dual chamber PPM generator change, POD #1.  TELE: sinus with v-pacing, occasional AV pacing rates 60-70 bpm.  EKG: sinus rhythm 74 bpm    MEDICATIONS  (STANDING):  apixaban 5 milliGRAM(s) Oral every 12 hours  atorvastatin 40 milliGRAM(s) Oral at bedtime  budesonide 160 MICROgram(s)/formoterol 4.5 MICROgram(s) Inhaler 2 Puff(s) Inhalation two times a day  cholecalciferol 1000 Unit(s) Oral daily  diltiazem    Tablet 60 milliGRAM(s) Oral daily  diltiazem    milliGRAM(s) Oral daily  influenza   Vaccine 0.5 milliLiter(s) IntraMuscular once  metoprolol succinate ER 25 milliGRAM(s) Oral two times a day  multivitamin 1 Tablet(s) Oral daily  tiotropium 18 MICROgram(s) Capsule 1 Capsule(s) Inhalation daily    MEDICATIONS  (PRN):  ALBUTerol    90 MICROgram(s) HFA Inhaler 2 Puff(s) Inhalation every 4 hours PRN for shortness of breath and/or wheezing  ALPRAZolam 0.25 milliGRAM(s) Oral two times a day PRN anxiety  guaiFENesin   Syrup  (Sugar-Free) 100 milliGRAM(s) Oral every 6 hours PRN Cough  sodium chloride 0.65% Nasal 1 Spray(s) Nasal every 2 hours PRN dry nasal passages  zolpidem 5 milliGRAM(s) Oral at bedtime PRN Insomnia  zolpidem 5 milliGRAM(s) Oral at bedtime PRN Insomnia    Allergies    No Known Allergies    Intolerances    Mobic (Other)      Vital Signs Last 24 Hrs  T(C): 37.3 (30 Sep 2020 20:40), Max: 37.5 (30 Sep 2020 10:54)  T(F): 99.2 (30 Sep 2020 20:40), Max: 99.5 (30 Sep 2020 10:54)  HR: 64 (01 Oct 2020 09:00) (32 - 86)  BP: 126/68 (01 Oct 2020 09:00) (91/63 - 144/84)  BP(mean): 82 (01 Oct 2020 09:00) (69 - 85)  RR: 24 (01 Oct 2020 05:00) (10 - 29)  SpO2: 95% (01 Oct 2020 09:00) (91% - 100%)                          15.6   9.94  )-----------( 228      ( 01 Oct 2020 06:06 )             46.4   10-01    137  |  104  |  30<H>  ----------------------------<  110<H>  4.1   |  26  |  1.27    Ca    9.5      01 Oct 2020 06:06    ROS: denies CP, SOB, dizziness, lightheadedness or palpitations.  All other ROS negative.    PHYSICAL EXAM:    General: WN/WD NAD  Neurology: A&Ox3, nonfocal, HUGO x 4  HEENT: NC/AT, neck supple, no JVD, EOMI, PERRL  Respiratory: CTA B/L  CV: RRR, S1S2, no murmurs, rubs or gallops  Abdominal: Soft, NT, ND +BS  Extremities: No edema, + peripheral pulses  Skin: No rashes.  Left chest wall incision is intact with Prineo dressing, no bleeding or hematoma noted.

## 2020-10-01 NOTE — DISCHARGE NOTE NURSING/CASE MANAGEMENT/SOCIAL WORK - PATIENT PORTAL LINK FT
You can access the FollowMyHealth Patient Portal offered by [facility] by registering at the following website: [email or web address]. By joining [facility]’s FollowMyHealth portal, you will also be able to view your health information using other applications (apps) compatible with our system.

## 2020-10-01 NOTE — DISCHARGE NOTE PROVIDER - CARE PROVIDERS DIRECT ADDRESSES
[email or web address]

## 2020-10-01 NOTE — PROGRESS NOTE ADULT - ASSESSMENT
75 year old female admitted with SOB and lightheadedness likely due to pacemaker battery reaching end of service and switching to VVI mode.  She is now s/p PPM gen change.    A/P: s/p dual chamber PPM gen change, POD #1  Post op instructions reviewed with pt   Resume prior medications.  Follow up as outpatient in 2 weeks in EP clinic.  Stable for discharge to home pending device eval this morning.

## 2020-10-01 NOTE — DISCHARGE NOTE PROVIDER - HOSPITAL COURSE
74 yo female with HLD, COPD, h/p breast cancer, PAF on toprol and cardizem s/p PPM implant, presented with c/p sob and chest tightness for  1week and noted with PPM at [facility] since 9/12/20.   pro-BNP on admission elevated at 1500, also noted to be in afib with rates 80-90bpm.  In the past she is aware of afib but not severely symptomatic and usually afib episodes last only couple of hours at a time.    Shortness of breath and heart failure is likely related to pacemaker syndrome due to loss of atrioventricular synchrony after device reached [facility] and switched to VVI pacing.   s/p generator replacement    chest cta   cvs s1s2  abd soft nt nd + bs  extr no e/c/c                            15.9   9.33  )-----------( 247      ( 29 Sep 2020 07:01 )             48.3       Acute diastolic CHF due to pacemaker [facility]    no manny    PAF

## 2020-10-01 NOTE — DISCHARGE NOTE PROVIDER - NSDCFUSCHEDAPPT_GEN_ALL_CORE_FT
TIA TAYLOR ; 10/13/2020 ; [facility] [address] ; 10/27/2020 ; [facility] [address] ; 12/16/2020 ; [facility] [address]

## 2020-10-01 NOTE — DISCHARGE NOTE PROVIDER - CARE PROVIDER_API CALL
Lev Gutierrez  CARDIOVASCULAR DISEASE  [address] [place] [address]  Phone: [phone]  Fax: [phone]  Follow Up Time:

## 2020-10-01 NOTE — PROVIDER CONTACT NOTE (OTHER) - ASSESSMENT
Pt asymptomatic during loss of capture, HR-30 to 60's interchangably paced and NSR. Pt denies CP or SOB

## 2020-10-01 NOTE — DISCHARGE NOTE PROVIDER - NSDCCPCAREPLAN_GEN_ALL_CORE_FT
PRINCIPAL DISCHARGE DIAGNOSIS  Diagnosis: Pacemaker at end of battery life  Assessment and Plan of Treatment: hold benzapril until you see Dr Gutierrez

## 2020-10-09 ENCOUNTER — APPOINTMENT (OUTPATIENT)
Dept: ELECTROPHYSIOLOGY | Facility: CLINIC | Age: 75
End: 2020-10-09

## 2020-10-09 DIAGNOSIS — Z79.51 LONG TERM (CURRENT) USE OF INHALED STEROIDS: ICD-10-CM

## 2020-10-09 DIAGNOSIS — I49.3 VENTRICULAR PREMATURE DEPOLARIZATION: ICD-10-CM

## 2020-10-09 DIAGNOSIS — Z90.12 ACQUIRED ABSENCE OF LEFT BREAST AND NIPPLE: ICD-10-CM

## 2020-10-09 DIAGNOSIS — Z87.891 PERSONAL HISTORY OF NICOTINE DEPENDENCE: ICD-10-CM

## 2020-10-09 DIAGNOSIS — Z79.01 LONG TERM (CURRENT) USE OF ANTICOAGULANTS: ICD-10-CM

## 2020-10-09 DIAGNOSIS — J06.9 ACUTE UPPER RESPIRATORY INFECTION, UNSPECIFIED: ICD-10-CM

## 2020-10-09 DIAGNOSIS — I50.31 ACUTE DIASTOLIC (CONGESTIVE) HEART FAILURE: ICD-10-CM

## 2020-10-09 DIAGNOSIS — Z85.3 PERSONAL HISTORY OF MALIGNANT NEOPLASM OF BREAST: ICD-10-CM

## 2020-10-09 DIAGNOSIS — I48.91 UNSPECIFIED ATRIAL FIBRILLATION: ICD-10-CM

## 2020-10-09 DIAGNOSIS — Z45.010 ENCOUNTER FOR CHECKING AND TESTING OF CARDIAC PACEMAKER PULSE GENERATOR [BATTERY]: ICD-10-CM

## 2020-10-09 DIAGNOSIS — E78.5 HYPERLIPIDEMIA, UNSPECIFIED: ICD-10-CM

## 2020-10-09 DIAGNOSIS — J44.9 CHRONIC OBSTRUCTIVE PULMONARY DISEASE, UNSPECIFIED: ICD-10-CM

## 2020-10-09 DIAGNOSIS — I11.0 HYPERTENSIVE HEART DISEASE WITH HEART FAILURE: ICD-10-CM

## 2020-10-13 ENCOUNTER — APPOINTMENT (OUTPATIENT)
Dept: ELECTROPHYSIOLOGY | Facility: CLINIC | Age: 75
End: 2020-10-13
Payer: MEDICARE

## 2020-10-13 VITALS
SYSTOLIC BLOOD PRESSURE: 152 MMHG | DIASTOLIC BLOOD PRESSURE: 84 MMHG | BODY MASS INDEX: 26.46 KG/M2 | HEART RATE: 53 BPM | WEIGHT: 155 LBS | HEIGHT: 64 IN | OXYGEN SATURATION: 95 %

## 2020-10-13 DIAGNOSIS — F41.9 ANXIETY DISORDER, UNSPECIFIED: ICD-10-CM

## 2020-10-13 PROCEDURE — 99024 POSTOP FOLLOW-UP VISIT: CPT

## 2020-10-13 RX ORDER — ALPRAZOLAM 0.5 MG/1
0.5 TABLET ORAL TWICE DAILY
Qty: 10 | Refills: 0 | Status: ACTIVE | COMMUNITY
Start: 2020-10-13 | End: 1900-01-01

## 2020-10-27 ENCOUNTER — APPOINTMENT (OUTPATIENT)
Dept: ELECTROPHYSIOLOGY | Facility: CLINIC | Age: 75
End: 2020-10-27

## 2021-01-14 ENCOUNTER — APPOINTMENT (OUTPATIENT)
Dept: ELECTROPHYSIOLOGY | Facility: CLINIC | Age: 76
End: 2021-01-14
Payer: MEDICARE

## 2021-01-14 VITALS
RESPIRATION RATE: 20 BRPM | HEART RATE: 65 BPM | WEIGHT: 160 LBS | DIASTOLIC BLOOD PRESSURE: 78 MMHG | SYSTOLIC BLOOD PRESSURE: 172 MMHG | HEIGHT: 64 IN | BODY MASS INDEX: 27.31 KG/M2

## 2021-01-14 DIAGNOSIS — Z86.79 PERSONAL HISTORY OF OTHER DISEASES OF THE CIRCULATORY SYSTEM: ICD-10-CM

## 2021-01-14 PROCEDURE — 93280 PM DEVICE PROGR EVAL DUAL: CPT

## 2021-03-05 ENCOUNTER — RESULT REVIEW (OUTPATIENT)
Age: 76
End: 2021-03-05

## 2021-03-05 ENCOUNTER — APPOINTMENT (OUTPATIENT)
Dept: RADIOLOGY | Facility: CLINIC | Age: 76
End: 2021-03-05
Payer: MEDICARE

## 2021-03-05 ENCOUNTER — OUTPATIENT (OUTPATIENT)
Dept: OUTPATIENT SERVICES | Facility: HOSPITAL | Age: 76
LOS: 1 days | End: 2021-03-05
Payer: MEDICARE

## 2021-03-05 DIAGNOSIS — Z90.12 ACQUIRED ABSENCE OF LEFT BREAST AND NIPPLE: Chronic | ICD-10-CM

## 2021-03-05 DIAGNOSIS — Z98.890 OTHER SPECIFIED POSTPROCEDURAL STATES: Chronic | ICD-10-CM

## 2021-03-05 DIAGNOSIS — Z95.0 PRESENCE OF CARDIAC PACEMAKER: Chronic | ICD-10-CM

## 2021-03-05 DIAGNOSIS — Z00.8 ENCOUNTER FOR OTHER GENERAL EXAMINATION: ICD-10-CM

## 2021-03-05 PROCEDURE — 77080 DXA BONE DENSITY AXIAL: CPT

## 2021-03-05 PROCEDURE — 77080 DXA BONE DENSITY AXIAL: CPT | Mod: 26

## 2021-04-14 ENCOUNTER — NON-APPOINTMENT (OUTPATIENT)
Age: 76
End: 2021-04-14

## 2021-04-14 ENCOUNTER — APPOINTMENT (OUTPATIENT)
Dept: ELECTROPHYSIOLOGY | Facility: CLINIC | Age: 76
End: 2021-04-14
Payer: MEDICARE

## 2021-04-15 PROCEDURE — 93296 REM INTERROG EVL PM/IDS: CPT

## 2021-04-15 PROCEDURE — 93294 REM INTERROG EVL PM/LDLS PM: CPT

## 2021-06-24 ENCOUNTER — RESULT REVIEW (OUTPATIENT)
Age: 76
End: 2021-06-24

## 2021-06-24 ENCOUNTER — APPOINTMENT (OUTPATIENT)
Dept: BREAST CENTER | Facility: CLINIC | Age: 76
End: 2021-06-24
Payer: MEDICARE

## 2021-06-24 VITALS
DIASTOLIC BLOOD PRESSURE: 68 MMHG | HEART RATE: 59 BPM | SYSTOLIC BLOOD PRESSURE: 124 MMHG | WEIGHT: 160 LBS | BODY MASS INDEX: 27.31 KG/M2 | HEIGHT: 64 IN

## 2021-06-24 PROCEDURE — 99213 OFFICE O/P EST LOW 20 MIN: CPT

## 2021-06-24 RX ORDER — TIOTROPIUM BROMIDE 18 UG/1
18 CAPSULE ORAL; RESPIRATORY (INHALATION)
Refills: 0 | Status: DISCONTINUED | COMMUNITY
End: 2021-06-24

## 2021-06-24 NOTE — HISTORY OF PRESENT ILLNESS
[FreeTextEntry1] : 76  year old female with a remote history of stage 1 left breast carcinoma presents for a surveillance examination.  She offers no complaints.

## 2021-06-24 NOTE — PHYSICAL EXAM
[Normocephalic] : normocephalic [Sclera nonicteric] : sclera nonicteric [Conjunctiva pink] : conjunctiva pink [Supple] : supple [No Supraclavicular Adenopathy] : no supraclavicular adenopathy [No Cervical Adenopathy] : no cervical adenopathy [No Thyromegaly] : no thyromegaly [Clear to Auscultation Bilat] : clear to auscultation bilaterally [Normal Sinus Rhythm] : normal sinus rhythm [Normal S1, S2] : normal S1 and S2 [Murmurs] : no murmurs [No Rubs] : no pericardial rub [No Gallops] : no gallops [Asymmetrical] : asymmetrical [Grade 2] : Ptosis Grade 2 [No dominant masses] : no dominant masses in right breast  [No Nipple Retraction] : no right nipple retraction [No Nipple Discharge] : no right nipple discharge [No Axillary Lymphadenopathy] : no left axillary lymphadenopathy [Soft] : abdomen soft [Normal Bowel Sounds] : normal bowel sounds  [No Hepato-Splenomegaly] : no hepato-splenomegaly [No Swelling] : no swelling [No Rashes] : no rashes [de-identified] : S/P mastectomy. No masses or skin lesions.

## 2021-07-01 ENCOUNTER — RESULT REVIEW (OUTPATIENT)
Age: 76
End: 2021-07-01

## 2021-07-01 ENCOUNTER — APPOINTMENT (OUTPATIENT)
Dept: ULTRASOUND IMAGING | Facility: CLINIC | Age: 76
End: 2021-07-01
Payer: MEDICARE

## 2021-07-01 ENCOUNTER — OUTPATIENT (OUTPATIENT)
Dept: OUTPATIENT SERVICES | Facility: HOSPITAL | Age: 76
LOS: 1 days | End: 2021-07-01
Payer: MEDICARE

## 2021-07-01 ENCOUNTER — APPOINTMENT (OUTPATIENT)
Dept: MAMMOGRAPHY | Facility: CLINIC | Age: 76
End: 2021-07-01
Payer: MEDICARE

## 2021-07-01 DIAGNOSIS — Z90.12 ACQUIRED ABSENCE OF LEFT BREAST AND NIPPLE: Chronic | ICD-10-CM

## 2021-07-01 DIAGNOSIS — Z95.0 PRESENCE OF CARDIAC PACEMAKER: Chronic | ICD-10-CM

## 2021-07-01 DIAGNOSIS — R92.2 INCONCLUSIVE MAMMOGRAM: ICD-10-CM

## 2021-07-01 DIAGNOSIS — Z98.890 OTHER SPECIFIED POSTPROCEDURAL STATES: Chronic | ICD-10-CM

## 2021-07-01 DIAGNOSIS — Z85.3 PERSONAL HISTORY OF MALIGNANT NEOPLASM OF BREAST: ICD-10-CM

## 2021-07-01 PROCEDURE — 77063 BREAST TOMOSYNTHESIS BI: CPT

## 2021-07-01 PROCEDURE — 76641 ULTRASOUND BREAST COMPLETE: CPT | Mod: 26,RT

## 2021-07-01 PROCEDURE — 77067 SCR MAMMO BI INCL CAD: CPT

## 2021-07-01 PROCEDURE — 77063 BREAST TOMOSYNTHESIS BI: CPT | Mod: 26,52

## 2021-07-01 PROCEDURE — 76641 ULTRASOUND BREAST COMPLETE: CPT

## 2021-07-01 PROCEDURE — 77067 SCR MAMMO BI INCL CAD: CPT | Mod: 26,RT,52

## 2021-07-14 ENCOUNTER — APPOINTMENT (OUTPATIENT)
Dept: ELECTROPHYSIOLOGY | Facility: CLINIC | Age: 76
End: 2021-07-14
Payer: MEDICARE

## 2021-07-15 ENCOUNTER — NON-APPOINTMENT (OUTPATIENT)
Age: 76
End: 2021-07-15

## 2021-07-15 PROCEDURE — 93296 REM INTERROG EVL PM/IDS: CPT

## 2021-07-15 PROCEDURE — 93294 REM INTERROG EVL PM/LDLS PM: CPT

## 2021-07-30 ENCOUNTER — NON-APPOINTMENT (OUTPATIENT)
Age: 76
End: 2021-07-30

## 2021-08-09 ENCOUNTER — APPOINTMENT (OUTPATIENT)
Dept: ELECTROPHYSIOLOGY | Facility: CLINIC | Age: 76
End: 2021-08-09
Payer: MEDICARE

## 2021-08-09 ENCOUNTER — NON-APPOINTMENT (OUTPATIENT)
Age: 76
End: 2021-08-09

## 2021-08-09 VITALS — HEART RATE: 65 BPM | SYSTOLIC BLOOD PRESSURE: 172 MMHG | OXYGEN SATURATION: 96 % | DIASTOLIC BLOOD PRESSURE: 82 MMHG

## 2021-08-09 PROCEDURE — 99215 OFFICE O/P EST HI 40 MIN: CPT

## 2021-08-09 PROCEDURE — 93000 ELECTROCARDIOGRAM COMPLETE: CPT | Mod: 59

## 2021-08-09 PROCEDURE — 93280 PM DEVICE PROGR EVAL DUAL: CPT

## 2021-08-11 RX ORDER — UMECLIDINIUM 62.5 UG/1
62.5 AEROSOL, POWDER ORAL
Qty: 90 | Refills: 0 | Status: ACTIVE | COMMUNITY
Start: 2021-06-16

## 2021-08-16 NOTE — HISTORY OF PRESENT ILLNESS
[FreeTextEntry1] : I had the pleasure of seeing your patient Tracee Bright today in the arrhythmia clinic at [facility]. As you well know, the patient is a pleasant 76 year old woman with a history of left breast cancer status post chemotherapy in the past, COPD, paroxysmal atrial fibrillation with tachy-maryam syndrome and underwent a dual chamber pacemaker initially on July 28, 2009 with a subsequent generator change in September 2020. Just recently, on a remote transmission, it was noted that the atrial lead changed to unipolar configuration with a lead impedance of greater than 3000 ohms. In addition, there were mode switch episodes that was consistent with atrial lead noise on the atrial electrograms. \par \par An Echocardiogram performed in September 2020 demonstrated an EF of 60-65% with no significant wall motion or valvular abnormalities. She also had a stress test in September 2020 which showed no ischemia.\par \par Today in clinic patient is without complaints and feels well. She denies chest pain, shortness of breath, palpitations, near syncope or syncope. Her blood pressure today is 172/82 with a pulse of 65 bpm and regular. Her device interrogation shows evidence of both atrial and ventricular lead failure. The atrial lead impedance is greater than 3000 ohms (both in unipolar and bipolar) with a p wave amplitude of 2 mV and a threshold of 0.5V @ 0.4ms. The RV lead impedance is 456 ohms with a threshold of 2.25V @ 0.4ms and an R wave amplitude of 4.5 mV. The battery longevity is 9 years. There were multiple episodes demonstrating atrial lead noise which were falsely labelled as mode switch episodes. There were also episodes demonstrating noise on the ventricular lead as well. Her ECG today shows sinus rhythm with at 65 bpm.\par \par Pacemaker Information:\par Pulse Generator: Medtronic [place] XT  MRI [id number] implanted 9/30/2020\par RA Lead: Medtronic 5076 implanted 7/28/2009\par RV Lead: Medtronic 5076 implanted 7/28/2009

## 2021-08-16 NOTE — PHYSICAL EXAM
[Well Developed] : well developed [Well Nourished] : well nourished [No Acute Distress] : no acute distress [Normal Conjunctiva] : normal conjunctiva [Normal Venous Pressure] : normal venous pressure [Normal S1, S2] : normal S1, S2 [No Murmur] : no murmur [No Rub] : no rub [No Gallop] : no gallop [Clear Lung Fields] : clear lung fields [Good Air Entry] : good air entry [No Respiratory Distress] : no respiratory distress  [Soft] : abdomen soft [Non Tender] : non-tender [Normal Gait] : normal gait [No Edema] : no edema [No Rash] : no rash [Moves all extremities] : moves all extremities [No Focal Deficits] : no focal deficits [Normal Speech] : normal speech [Alert and Oriented] : alert and oriented [Normal memory] : normal memory [de-identified] : Pacemaker site (left pectoral): Well healed. No erythema or collaterals

## 2021-08-16 NOTE — DISCUSSION/SUMMARY
[FreeTextEntry1] : In summary, Ms. Bright is pleasant 76 year-old woman with a history of left breast cancer status post chemotherapy in the past, COPD, paroxysmal atrial fibrillation with tachy-maryam syndrome and has a dual chamber pacemaker initially on July 28, 2009 with a subsequent generator change in September 2020. Her pacemaker is now showing evidence of atrial as well as ventricular lead failure. Her atrial lead impedance has now abruptly increased to greater than 3000 ohms, and there are episodes of noise on both the atrial and ventricular electrograms. We discussed options of lead management including abandoning leads with new leads implanted versus lead extraction with reimplant of new leads. Risks and benefits of each option discussed. The procedures, outcomes and risks of extraction were reviewed. The risks discussed included but were not limited to bleeding, vascular tear, tamponade, perforation, valvular damage, need for emergent cardiac surgery, pneumothorax and death. After all questions were answered, patient would like to proceed with extraction and reimplant. We will make arrangements to schedule her procedure. Her Eliquis will be held prior to her procedure.\par

## 2021-08-17 ENCOUNTER — NON-APPOINTMENT (OUTPATIENT)
Age: 76
End: 2021-08-17

## 2021-08-19 ENCOUNTER — RESULT REVIEW (OUTPATIENT)
Age: 76
End: 2021-08-19

## 2021-08-19 ENCOUNTER — OUTPATIENT (OUTPATIENT)
Dept: OUTPATIENT SERVICES | Facility: HOSPITAL | Age: 76
LOS: 1 days | End: 2021-08-19
Payer: MEDICARE

## 2021-08-19 VITALS
HEIGHT: 63 IN | OXYGEN SATURATION: 96 % | TEMPERATURE: 98 F | RESPIRATION RATE: 16 BRPM | WEIGHT: 158.07 LBS | HEART RATE: 66 BPM | DIASTOLIC BLOOD PRESSURE: 90 MMHG | SYSTOLIC BLOOD PRESSURE: 158 MMHG

## 2021-08-19 DIAGNOSIS — T82.110A BREAKDOWN (MECHANICAL) OF CARDIAC ELECTRODE, INITIAL ENCOUNTER: ICD-10-CM

## 2021-08-19 DIAGNOSIS — Z90.49 ACQUIRED ABSENCE OF OTHER SPECIFIED PARTS OF DIGESTIVE TRACT: Chronic | ICD-10-CM

## 2021-08-19 DIAGNOSIS — I49.5 SICK SINUS SYNDROME: ICD-10-CM

## 2021-08-19 DIAGNOSIS — Z98.890 OTHER SPECIFIED POSTPROCEDURAL STATES: Chronic | ICD-10-CM

## 2021-08-19 DIAGNOSIS — I48.91 UNSPECIFIED ATRIAL FIBRILLATION: ICD-10-CM

## 2021-08-19 DIAGNOSIS — Z29.9 ENCOUNTER FOR PROPHYLACTIC MEASURES, UNSPECIFIED: ICD-10-CM

## 2021-08-19 DIAGNOSIS — Z90.12 ACQUIRED ABSENCE OF LEFT BREAST AND NIPPLE: Chronic | ICD-10-CM

## 2021-08-19 DIAGNOSIS — Z01.818 ENCOUNTER FOR OTHER PREPROCEDURAL EXAMINATION: ICD-10-CM

## 2021-08-19 DIAGNOSIS — Z95.0 PRESENCE OF CARDIAC PACEMAKER: Chronic | ICD-10-CM

## 2021-08-19 LAB
ANION GAP SERPL CALC-SCNC: 12 MMOL/L — SIGNIFICANT CHANGE UP (ref 5–17)
BLD GP AB SCN SERPL QL: NEGATIVE — SIGNIFICANT CHANGE UP
BUN SERPL-MCNC: 19 MG/DL — SIGNIFICANT CHANGE UP (ref 7–23)
CALCIUM SERPL-MCNC: 10.3 MG/DL — SIGNIFICANT CHANGE UP (ref 8.4–10.5)
CHLORIDE SERPL-SCNC: 105 MMOL/L — SIGNIFICANT CHANGE UP (ref 96–108)
CO2 SERPL-SCNC: 21 MMOL/L — LOW (ref 22–31)
CREAT SERPL-MCNC: 1.14 MG/DL — SIGNIFICANT CHANGE UP (ref 0.5–1.3)
GLUCOSE SERPL-MCNC: 96 MG/DL — SIGNIFICANT CHANGE UP (ref 70–99)
HCT VFR BLD CALC: 46.7 % — HIGH (ref 34.5–45)
HGB BLD-MCNC: 15.7 G/DL — HIGH (ref 11.5–15.5)
MCHC RBC-ENTMCNC: 31.2 PG — SIGNIFICANT CHANGE UP (ref 27–34)
MCHC RBC-ENTMCNC: 33.6 GM/DL — SIGNIFICANT CHANGE UP (ref 32–36)
MCV RBC AUTO: 92.7 FL — SIGNIFICANT CHANGE UP (ref 80–100)
NRBC # BLD: 0 /100 WBCS — SIGNIFICANT CHANGE UP (ref 0–0)
PLATELET # BLD AUTO: 245 K/UL — SIGNIFICANT CHANGE UP (ref 150–400)
POTASSIUM SERPL-MCNC: 4.4 MMOL/L — SIGNIFICANT CHANGE UP (ref 3.5–5.3)
POTASSIUM SERPL-SCNC: 4.4 MMOL/L — SIGNIFICANT CHANGE UP (ref 3.5–5.3)
RBC # BLD: 5.04 M/UL — SIGNIFICANT CHANGE UP (ref 3.8–5.2)
RBC # FLD: 13.4 % — SIGNIFICANT CHANGE UP (ref 10.3–14.5)
RH IG SCN BLD-IMP: POSITIVE — SIGNIFICANT CHANGE UP
SODIUM SERPL-SCNC: 138 MMOL/L — SIGNIFICANT CHANGE UP (ref 135–145)
WBC # BLD: 8.74 K/UL — SIGNIFICANT CHANGE UP (ref 3.8–10.5)
WBC # FLD AUTO: 8.74 K/UL — SIGNIFICANT CHANGE UP (ref 3.8–10.5)

## 2021-08-19 PROCEDURE — 71046 X-RAY EXAM CHEST 2 VIEWS: CPT | Mod: 26

## 2021-08-19 RX ORDER — CLINDAMYCIN PHOSPHATE GEL USP, 1% 10 MG/G
1 GEL TOPICAL
Qty: 0 | Refills: 0 | DISCHARGE

## 2021-08-19 RX ORDER — DILTIAZEM HCL 120 MG
300 CAPSULE, EXT RELEASE 24 HR ORAL
Qty: 0 | Refills: 0 | DISCHARGE

## 2021-08-19 RX ORDER — BENAZEPRIL HYDROCHLORIDE 40 MG/1
1 TABLET ORAL
Qty: 0 | Refills: 0 | DISCHARGE

## 2021-08-19 RX ORDER — CHLORHEXIDINE GLUCONATE 213 G/1000ML
1 SOLUTION TOPICAL ONCE
Refills: 0 | Status: DISCONTINUED | OUTPATIENT
Start: 2021-08-24 | End: 2021-08-25

## 2021-08-19 RX ORDER — SODIUM CHLORIDE 9 MG/ML
3 INJECTION INTRAMUSCULAR; INTRAVENOUS; SUBCUTANEOUS EVERY 8 HOURS
Refills: 0 | Status: DISCONTINUED | OUTPATIENT
Start: 2021-08-24 | End: 2021-08-24

## 2021-08-19 RX ORDER — SODIUM CHLORIDE 0.65 %
2 AEROSOL, SPRAY (ML) NASAL
Qty: 0 | Refills: 0 | DISCHARGE

## 2021-08-19 RX ORDER — FENOFIBRATE,MICRONIZED 130 MG
0 CAPSULE ORAL
Qty: 0 | Refills: 0 | DISCHARGE

## 2021-08-19 RX ORDER — DILTIAZEM HCL 120 MG
1 CAPSULE, EXT RELEASE 24 HR ORAL
Qty: 0 | Refills: 0 | DISCHARGE

## 2021-08-19 RX ORDER — DILTIAZEM HCL 120 MG
0 CAPSULE, EXT RELEASE 24 HR ORAL
Qty: 0 | Refills: 0 | DISCHARGE

## 2021-08-19 RX ORDER — ROSUVASTATIN CALCIUM 5 MG/1
1 TABLET ORAL
Qty: 0 | Refills: 0 | DISCHARGE

## 2021-08-19 RX ORDER — CEFUROXIME AXETIL 250 MG
1500 TABLET ORAL ONCE
Refills: 0 | Status: DISCONTINUED | OUTPATIENT
Start: 2021-08-24 | End: 2021-08-25

## 2021-08-19 RX ORDER — APIXABAN 2.5 MG/1
1 TABLET, FILM COATED ORAL
Qty: 0 | Refills: 0 | DISCHARGE

## 2021-08-19 RX ORDER — LIDOCAINE HCL 20 MG/ML
0.2 VIAL (ML) INJECTION ONCE
Refills: 0 | Status: DISCONTINUED | OUTPATIENT
Start: 2021-08-24 | End: 2021-08-25

## 2021-08-19 RX ORDER — TIOTROPIUM BROMIDE 18 UG/1
1 CAPSULE ORAL; RESPIRATORY (INHALATION)
Qty: 0 | Refills: 0 | DISCHARGE

## 2021-08-19 NOTE — H&P PST ADULT - ASSESSMENT
VALENTINEI VTE 2.0 SCORE [CLOT updated 2019]    AGE RELATED RISK FACTORS                                                       MOBILITY RELATED FACTORS  [ ] Age 41-60 years                                            (1 Point)                    [ ] Bed rest                                                        (1 Point)  [ ] Age: 61-74 years                                           (2 Points)                  [ ] Plaster cast                                                   (2 Points)  [x ] Age= 75 years                                              (3 Points)                    [ ] Bed bound for more than 72 hours                 (2 Points)    DISEASE RELATED RISK FACTORS                                               GENDER SPECIFIC FACTORS  [ ] Edema in the lower extremities                       (1 Point)              [ ] Pregnancy                                                     (1 Point)  [ ] Varicose veins                                               (1 Point)                     [ ] Post-partum < 6 weeks                                   (1 Point)             [ x] BMI > 25 Kg/m2                                            (1 Point)                     [ ] Hormonal therapy  or oral contraception          (1 Point)                 [ ] Sepsis (in the previous month)                        (1 Point)               [ ] History of pregnancy complications                 (1 point)  [ ] Pneumonia or serious lung disease                                               [ ] Unexplained or recurrent                     (1 Point)           (in the previous month)                               (1 Point)  [ ] Abnormal pulmonary function test                     (1 Point)                 SURGERY RELATED RISK FACTORS  [ ] Acute myocardial infarction                              (1 Point)               [ ]  Section                                             (1 Point)  [ ] Congestive heart failure (in the previous month)  (1 Point)      [ ] Minor surgery                                                  (1 Point)   [ ] Inflammatory bowel disease                             (1 Point)               [ ] Arthroscopic surgery                                        (2 Points)  [ ] Central venous access                                      (2 Points)                [x ] General surgery lasting more than 45 minutes (2 points)  [ ] Malignancy- Present or previous                   (2 Points)                [ ] Elective arthroplasty                                         (5 points)    [ ] Stroke (in the previous month)                          (5 Points)                                                                                                                                                           HEMATOLOGY RELATED FACTORS                                                 TRAUMA RELATED RISK FACTORS  [ ] Prior episodes of VTE                                     (3 Points)                [ ] Fracture of the hip, pelvis, or leg                       (5 Points)  [ ] Positive family history for VTE                         (3 Points)             [ ] Acute spinal cord injury (in the previous month)  (5 Points)  [ ] Prothrombin 54332 A                                     (3 Points)               [ ] Paralysis  (less than 1 month)                             (5 Points)  [ ] Factor V Leiden                                             (3 Points)                  [ ] Multiple Trauma within 1 month                        (5 Points)  [ ] Lupus anticoagulants                                     (3 Points)                                                           [ ] Anticardiolipin antibodies                               (3 Points)                                                       [ ] High homocysteine in the blood                      (3 Points)                                             [ ] Other congenital or acquired thrombophilia      (3 Points)                                                [ ] Heparin induced thrombocytopenia                  (3 Points)                                     Total Score [   6       ]

## 2021-08-19 NOTE — H&P PST ADULT - HISTORY OF PRESENT ILLNESS
76 yr old female with h/o breast ca 1995, former smoker, stable COPD/emphysema, HTN. HLD, paroxysmal afib on Eliquis, tachy-maryam syndrome, PPM ( 2009) generator change 2020. Patient reports pacemaker leads malfunction and presents to PST for scheduled pacemaker lead extraction /pacemaker implant on 8/24/2021.  Denies CP, palpitations, chronic SOB, does not require oxygen. Denies fever, chills, no acute complaints. Denies sick contacts. Covid PCR 8/21/2021 @ [place]. Ambulating without assistance.        ***Patient was instructed by cardiologist to hold Eliquis 3 days prior procedure ***

## 2021-08-19 NOTE — H&P PST ADULT - PROBLEM SELECTOR PLAN 1
Pacemaker lead extraction /pacemaker implant   PST instructions provided, surgical scrub given, patient verbalized understanding.   CBC, BMP, HgA1c,TG&C collected and sent   CXR today   Covid PCR 8/21/2021 Pacemaker lead extraction /pacemaker implant   PST instructions provided, surgical scrub given, patient verbalized understanding.   CBC, BMP, HgA1c,TG&C collected and sent   CXR today   Covid PCR 8/21/2021  pacemaker interrogation report on chart 8/9/2021 Pacemaker lead extraction /pacemaker implant   PST instructions provided, surgical scrub given, patient verbalized understanding.   CBC, BMP, HgA1c,T&S collected and sent   CXR today   Covid PCR 8/21/2021  pacemaker interrogation report on chart 8/9/2021

## 2021-08-19 NOTE — H&P PST ADULT - NSICDXPASTMEDICALHX_GEN_ALL_CORE_FT
PAST MEDICAL HISTORY:  Afib on Eliquis    Borderline type 2 diabetes mellitus     COPD with emphysema     Erythrocytosis Chronic, compensatory - mild    Former smoker     Hyperlipidemia, unspecified hyperlipidemia type     Hypertension     Left knee pain     Malignant neoplasm of left female breast, unspecified estrogen receptor status, unspecified site of breast 1995 , h/o chemo    Osteoarthritis both knees L>R    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     PAST MEDICAL HISTORY:  Afib on Eliquis    Borderline type 2 diabetes mellitus     COPD with emphysema     Erythrocytosis Chronic, compensatory - mild    Former smoker     History of ETOH abuse     Hyperlipidemia, unspecified hyperlipidemia type     Hypertension     Left knee pain     Malignant neoplasm of left female breast, unspecified estrogen receptor status, unspecified site of breast 1995 , h/o chemo    Osteoarthritis both knees L>R    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

## 2021-08-19 NOTE — H&P PST ADULT - NSICDXPASTSURGICALHX_GEN_ALL_CORE_FT
PAST SURGICAL HISTORY:  Artificial pacemaker Medtronic Model [id number]  initially 7/28/2009,  generator change 9/30/2020 , last interrogation 8/9/2021    H/O mastectomy, left 1995    History of appendectomy 2018    History of arthroscopy of left knee

## 2021-08-19 NOTE — H&P PST ADULT - PRIMARY CARE PROVIDER
and cardiologist Dr. Tee Okeefe [phone] and cardiologist Dr. eTe Okeefe [phone] last visit a week ago

## 2021-08-20 LAB
A1C WITH ESTIMATED AVERAGE GLUCOSE RESULT: 5.7 % — HIGH (ref 4–5.6)
ESTIMATED AVERAGE GLUCOSE: 117 MG/DL — HIGH (ref 68–114)

## 2021-08-21 ENCOUNTER — OUTPATIENT (OUTPATIENT)
Dept: OUTPATIENT SERVICES | Facility: HOSPITAL | Age: 76
LOS: 1 days | End: 2021-08-21

## 2021-08-21 ENCOUNTER — LABORATORY RESULT (OUTPATIENT)
Age: 76
End: 2021-08-21

## 2021-08-21 ENCOUNTER — APPOINTMENT (OUTPATIENT)
Dept: DISASTER EMERGENCY | Facility: CLINIC | Age: 76
End: 2021-08-21

## 2021-08-21 DIAGNOSIS — Z11.52 ENCOUNTER FOR SCREENING FOR COVID-19: ICD-10-CM

## 2021-08-21 DIAGNOSIS — Z98.890 OTHER SPECIFIED POSTPROCEDURAL STATES: Chronic | ICD-10-CM

## 2021-08-21 DIAGNOSIS — Z90.49 ACQUIRED ABSENCE OF OTHER SPECIFIED PARTS OF DIGESTIVE TRACT: Chronic | ICD-10-CM

## 2021-08-21 DIAGNOSIS — Z01.818 ENCOUNTER FOR OTHER PREPROCEDURAL EXAMINATION: ICD-10-CM

## 2021-08-21 DIAGNOSIS — Z95.0 PRESENCE OF CARDIAC PACEMAKER: Chronic | ICD-10-CM

## 2021-08-21 DIAGNOSIS — Z90.12 ACQUIRED ABSENCE OF LEFT BREAST AND NIPPLE: Chronic | ICD-10-CM

## 2021-08-21 PROBLEM — D75.1 SECONDARY POLYCYTHEMIA: Chronic | Status: ACTIVE | Noted: 2021-08-19

## 2021-08-21 PROBLEM — M19.90 UNSPECIFIED OSTEOARTHRITIS, UNSPECIFIED SITE: Chronic | Status: ACTIVE | Noted: 2021-08-19

## 2021-08-21 PROBLEM — Z87.891 PERSONAL HISTORY OF NICOTINE DEPENDENCE: Chronic | Status: ACTIVE | Noted: 2021-08-19

## 2021-08-21 PROBLEM — M25.562 PAIN IN LEFT KNEE: Chronic | Status: ACTIVE | Noted: 2021-08-19

## 2021-08-21 PROBLEM — C50.912 MALIGNANT NEOPLASM OF UNSPECIFIED SITE OF LEFT FEMALE BREAST: Chronic | Status: ACTIVE | Noted: 2018-05-13

## 2021-08-21 PROBLEM — R73.03 PREDIABETES: Chronic | Status: ACTIVE | Noted: 2021-08-19

## 2021-08-21 PROBLEM — I48.91 UNSPECIFIED ATRIAL FIBRILLATION: Chronic | Status: ACTIVE | Noted: 2018-05-13

## 2021-08-21 PROBLEM — I10 ESSENTIAL (PRIMARY) HYPERTENSION: Chronic | Status: ACTIVE | Noted: 2021-08-19

## 2021-08-23 ENCOUNTER — TRANSCRIPTION ENCOUNTER (OUTPATIENT)
Age: 76
End: 2021-08-23

## 2021-08-24 ENCOUNTER — TRANSCRIPTION ENCOUNTER (OUTPATIENT)
Age: 76
End: 2021-08-24

## 2021-08-24 ENCOUNTER — OUTPATIENT (OUTPATIENT)
Dept: INPATIENT UNIT | Facility: HOSPITAL | Age: 76
LOS: 1 days | End: 2021-08-24
Payer: MEDICARE

## 2021-08-24 VITALS
HEIGHT: 63 IN | DIASTOLIC BLOOD PRESSURE: 73 MMHG | WEIGHT: 158.07 LBS | HEART RATE: 67 BPM | SYSTOLIC BLOOD PRESSURE: 138 MMHG | RESPIRATION RATE: 16 BRPM | OXYGEN SATURATION: 96 % | TEMPERATURE: 98 F

## 2021-08-24 DIAGNOSIS — T82.110A BREAKDOWN (MECHANICAL) OF CARDIAC ELECTRODE, INITIAL ENCOUNTER: ICD-10-CM

## 2021-08-24 DIAGNOSIS — Z90.49 ACQUIRED ABSENCE OF OTHER SPECIFIED PARTS OF DIGESTIVE TRACT: Chronic | ICD-10-CM

## 2021-08-24 DIAGNOSIS — Z98.890 OTHER SPECIFIED POSTPROCEDURAL STATES: Chronic | ICD-10-CM

## 2021-08-24 DIAGNOSIS — Z95.0 PRESENCE OF CARDIAC PACEMAKER: Chronic | ICD-10-CM

## 2021-08-24 DIAGNOSIS — Z90.12 ACQUIRED ABSENCE OF LEFT BREAST AND NIPPLE: Chronic | ICD-10-CM

## 2021-08-24 DIAGNOSIS — I49.5 SICK SINUS SYNDROME: ICD-10-CM

## 2021-08-24 LAB
GAS PNL BLDA: SIGNIFICANT CHANGE UP
GAS PNL BLDA: SIGNIFICANT CHANGE UP
RH IG SCN BLD-IMP: POSITIVE — SIGNIFICANT CHANGE UP

## 2021-08-24 PROCEDURE — 71045 X-RAY EXAM CHEST 1 VIEW: CPT | Mod: 26

## 2021-08-24 RX ORDER — HYDROCHLOROTHIAZIDE 25 MG
25 TABLET ORAL DAILY
Refills: 0 | Status: DISCONTINUED | OUTPATIENT
Start: 2021-08-24 | End: 2021-08-25

## 2021-08-24 RX ORDER — ALBUTEROL 90 UG/1
2 AEROSOL, METERED ORAL EVERY 6 HOURS
Refills: 0 | Status: DISCONTINUED | OUTPATIENT
Start: 2021-08-24 | End: 2021-08-25

## 2021-08-24 RX ORDER — CEFAZOLIN SODIUM 1 G
1000 VIAL (EA) INJECTION EVERY 8 HOURS
Refills: 0 | Status: COMPLETED | OUTPATIENT
Start: 2021-08-24 | End: 2021-08-25

## 2021-08-24 RX ORDER — LISINOPRIL 2.5 MG/1
20 TABLET ORAL DAILY
Refills: 0 | Status: DISCONTINUED | OUTPATIENT
Start: 2021-08-24 | End: 2021-08-25

## 2021-08-24 RX ORDER — ACETAMINOPHEN 500 MG
650 TABLET ORAL EVERY 6 HOURS
Refills: 0 | Status: DISCONTINUED | OUTPATIENT
Start: 2021-08-24 | End: 2021-08-25

## 2021-08-24 RX ORDER — BENZOCAINE AND MENTHOL 5; 1 G/100ML; G/100ML
1 LIQUID ORAL EVERY 6 HOURS
Refills: 0 | Status: DISCONTINUED | OUTPATIENT
Start: 2021-08-24 | End: 2021-08-25

## 2021-08-24 RX ORDER — CHOLECALCIFEROL (VITAMIN D3) 125 MCG
1000 CAPSULE ORAL DAILY
Refills: 0 | Status: DISCONTINUED | OUTPATIENT
Start: 2021-08-24 | End: 2021-08-25

## 2021-08-24 RX ORDER — ATORVASTATIN CALCIUM 80 MG/1
40 TABLET, FILM COATED ORAL AT BEDTIME
Refills: 0 | Status: DISCONTINUED | OUTPATIENT
Start: 2021-08-24 | End: 2021-08-25

## 2021-08-24 RX ORDER — DILTIAZEM HCL 120 MG
180 CAPSULE, EXT RELEASE 24 HR ORAL DAILY
Refills: 0 | Status: DISCONTINUED | OUTPATIENT
Start: 2021-08-25 | End: 2021-08-25

## 2021-08-24 RX ORDER — ZOLPIDEM TARTRATE 10 MG/1
5 TABLET ORAL AT BEDTIME
Refills: 0 | Status: DISCONTINUED | OUTPATIENT
Start: 2021-08-24 | End: 2021-08-25

## 2021-08-24 RX ORDER — METOPROLOL TARTRATE 50 MG
25 TABLET ORAL
Refills: 0 | Status: DISCONTINUED | OUTPATIENT
Start: 2021-08-24 | End: 2021-08-25

## 2021-08-24 RX ORDER — ALPRAZOLAM 0.25 MG
0.5 TABLET ORAL THREE TIMES A DAY
Refills: 0 | Status: DISCONTINUED | OUTPATIENT
Start: 2021-08-24 | End: 2021-08-25

## 2021-08-24 RX ADMIN — ATORVASTATIN CALCIUM 40 MILLIGRAM(S): 80 TABLET, FILM COATED ORAL at 22:20

## 2021-08-24 RX ADMIN — LISINOPRIL 20 MILLIGRAM(S): 2.5 TABLET ORAL at 22:19

## 2021-08-24 RX ADMIN — BENZOCAINE AND MENTHOL 1 LOZENGE: 5; 1 LIQUID ORAL at 22:33

## 2021-08-24 RX ADMIN — Medication 25 MILLIGRAM(S): at 22:20

## 2021-08-24 RX ADMIN — ZOLPIDEM TARTRATE 5 MILLIGRAM(S): 10 TABLET ORAL at 23:48

## 2021-08-24 RX ADMIN — Medication 100 MILLIGRAM(S): at 18:00

## 2021-08-24 NOTE — PRE-ANESTHESIA EVALUATION ADULT - NSANTHOSAYNRD_GEN_A_CORE
was tested/No. CORBY screening performed.  STOP BANG Legend: 0-2 = LOW Risk; 3-4 = INTERMEDIATE Risk; 5-8 = HIGH Risk

## 2021-08-24 NOTE — PRE-ANESTHESIA EVALUATION ADULT - NSANTHPMHFT_GEN_ALL_CORE
76 yr old female with h/o breast ca 1995, former smoker, stable COPD/emphysema, HTN. HLD, paroxysmal afib on Eliquis, tachy-maryam syndrome, PPM ( 2009) generator change 2020. Patient reports pacemaker leads malfunction and presents to PST for scheduled pacemaker lead extraction /pacemaker implant

## 2021-08-24 NOTE — CHART NOTE - NSCHARTNOTEFT_GEN_A_CORE
Pre-op Diagnosis:  sick sinus syndrome  pacemaker lead faliure    Post-op Diagnosis:  same    Procedure:  pacemaker/lead extraction  implant of AAI pacemaker    Electrophysiologist:  Dr. Kelly    Assistant:  Fellow Dr. García    Anesthesia:  GA    Description:  The patient was included in a shared decision making process and received appropriate material concerning the benefits, procedures, outcomes and risks of the procedure. and informed written consent was obtained. The patient was transported to the operating room in the post absorptive, non-sedated state. Prior to the procedure antibiotics were administered (see nursing/anesthesia note). The team verification process was completed prior to the start of the procedure. Written consent and a completed procedural sedation form were confirmed. Allergies/Adverse reactions were noted and patient teaching was performed. The patient was positioned on table and bilateral arm supports with soft cushions and all pressure points were padded.     A large diameter grounding pad was applied to the patient's thigh. ECG electrodes and a hands-free defibrillator pad was applied to the patient. A MCL/12 lead ECG was continuously monitored throughout the procedure. Continuous noninvasive monitoring of heart rhythm, oxygen saturation, nasal capnography and blood pressure was established and monitored throughout the procedure. The entire chest to the thighs was meticulously prepared and scrubbed with chlorhexidine then allowed to completely air dry. Sterile draping was applied using sterile towels, ioban and a Steri-Drape to cover the operative field. The image intensifier was draped with a sterile bag. A surgical time out was performed. An arterial line was utilized for pressure monitoring throughout the procedure and a PAULINE probe was placed for surveillance throughout the procedure. Anesthesia was provided as per the anesthesia service. Please see the anesthesia flow sheet for full sedation details.    Using the modified Seldinger technique a 6Fr peel away sheath was placed int he RFV and a long super stiff guide wire was advanced to the RIJ to serve as a guide should the bridge rescue balloon be required.     EXTRACTION  There was clear evidence or venous occlusion with extensive collaterals on the patients left chest. 0.5% Marcaine was used for local anesthesia and applied to the region of the left pre-pectoral/subclavicular region . A 3-inch elliptical incision was made around the prior scar. Via the incision the PCM pocket was entered and the PCM disconnected from the leads and removed. The leads were freed from encapsulated scar in the pocket. The active fixation mechanism was retracted, the leads were cut and the anchors removed. Locking stylets were placed distally and a #5 silk tied to the outside of the lead. A 16Fr laser sheath was employed to free the leads from encapsulating scar tissue in the vasculature and heart. The ventricular lead was approached first. There was binding at the venous entry site and at sites along it's course where it crossed the atrial lead. The sheath was advanced to the distal electrode and the lead removed with gentle counter traction. Via the laser sheath 2 long guidewires were placed for placement of the new system. The atrial lead was removed in a similar fashion.     PACEMAKER IMPLANT  Over the guidewire, under fluoroscopic guidance a C315 delivery catheter was positioned in the right atrium. In B[place] the catheter was advanced across the tricuspid valve.  The RV lead (Medtronic [id number]) was placed within the catheter, just proximal to the tip. In [place] the catheter was positioned on the high RV septum. The lead was extended and screwed into the septum. Via a PSA the lead electrical parameters were tested. The lead dislodged and was repositioned and tested. The delivery catheter was slit and removed and the lead anchored to the facial plain with 0-silk sutures. A 7Fr. long peel away sheath was positioned over the guidewire and a Medtronic 5076 pacing lead was advanced to the right atrium. Utilizing a J shaped stylet the RA lead was positioned in the right atrial appendage. The active fixation mechanism was extended and the leads was again electrically tested. With removal of the peel away sheath the RV lead dislodged and could not be repositioned. The atrial lead was anchored to the facial plain with 0-silk sutures. Via the sheath in the RFV a 15mm gooseneck snare was advanced to the RA and snared the end of the RV lead. An initial attempt to pass a 12 Fr laser sheath was unsuccessful due to size and an attempt was made with an 11FR SubC without success. Therefore access was lost on the left. An axillary puncture was attempted but the vein was completely occluded. With pacing via the atrial lead the patient had 1:1 conduction to 110BPM, therefore a decision was made to place an AAI pacemaker as opposed to going to the contralateral side, which the patient was trying to avoid. The capsule was disrupted and the pocket was irrigated with an antibiotic solution. A single-chamber pacemaker was attached to the leads and placed in an antibiotic envelope and then placed in the pocket. The device and leads were tested. The wound was closed in layers with 2-0 and 3-0 vicryl/4-0 monocryl sutures and Steri-strips were applied.     The guidewire and sheath were removed and hemostasis achieved with pressure. The patient tolerated the procedure well without complication and was returned to the recovery room in good condition.    Complications:  none    EBL:  50 cc    Disposition:  PACU    Plan:  Stat CXR today, PA/Lateral in AM, ECG  No heparin/Lovinox

## 2021-08-25 ENCOUNTER — TRANSCRIPTION ENCOUNTER (OUTPATIENT)
Age: 76
End: 2021-08-25

## 2021-08-25 VITALS
TEMPERATURE: 98 F | SYSTOLIC BLOOD PRESSURE: 127 MMHG | HEART RATE: 76 BPM | RESPIRATION RATE: 16 BRPM | OXYGEN SATURATION: 95 % | DIASTOLIC BLOOD PRESSURE: 75 MMHG

## 2021-08-25 LAB
ANION GAP SERPL CALC-SCNC: 10 MMOL/L — SIGNIFICANT CHANGE UP (ref 5–17)
BUN SERPL-MCNC: 18 MG/DL — SIGNIFICANT CHANGE UP (ref 7–23)
CALCIUM SERPL-MCNC: 10 MG/DL — SIGNIFICANT CHANGE UP (ref 8.4–10.5)
CHLORIDE SERPL-SCNC: 104 MMOL/L — SIGNIFICANT CHANGE UP (ref 96–108)
CO2 SERPL-SCNC: 21 MMOL/L — LOW (ref 22–31)
CREAT SERPL-MCNC: 1.02 MG/DL — SIGNIFICANT CHANGE UP (ref 0.5–1.3)
GLUCOSE SERPL-MCNC: 120 MG/DL — HIGH (ref 70–99)
HCT VFR BLD CALC: 41.5 % — SIGNIFICANT CHANGE UP (ref 34.5–45)
HGB BLD-MCNC: 13.8 G/DL — SIGNIFICANT CHANGE UP (ref 11.5–15.5)
MCHC RBC-ENTMCNC: 31.2 PG — SIGNIFICANT CHANGE UP (ref 27–34)
MCHC RBC-ENTMCNC: 33.3 GM/DL — SIGNIFICANT CHANGE UP (ref 32–36)
MCV RBC AUTO: 93.7 FL — SIGNIFICANT CHANGE UP (ref 80–100)
NRBC # BLD: 0 /100 WBCS — SIGNIFICANT CHANGE UP (ref 0–0)
PLATELET # BLD AUTO: 209 K/UL — SIGNIFICANT CHANGE UP (ref 150–400)
POTASSIUM SERPL-MCNC: 4.5 MMOL/L — SIGNIFICANT CHANGE UP (ref 3.5–5.3)
POTASSIUM SERPL-SCNC: 4.5 MMOL/L — SIGNIFICANT CHANGE UP (ref 3.5–5.3)
RBC # BLD: 4.43 M/UL — SIGNIFICANT CHANGE UP (ref 3.8–5.2)
RBC # FLD: 13.2 % — SIGNIFICANT CHANGE UP (ref 10.3–14.5)
SODIUM SERPL-SCNC: 135 MMOL/L — SIGNIFICANT CHANGE UP (ref 135–145)
WBC # BLD: 11.82 K/UL — HIGH (ref 3.8–10.5)
WBC # FLD AUTO: 11.82 K/UL — HIGH (ref 3.8–10.5)

## 2021-08-25 PROCEDURE — 85027 COMPLETE CBC AUTOMATED: CPT

## 2021-08-25 PROCEDURE — 93312 ECHO TRANSESOPHAGEAL: CPT

## 2021-08-25 PROCEDURE — 76000 FLUOROSCOPY <1 HR PHYS/QHP: CPT

## 2021-08-25 PROCEDURE — 82435 ASSAY OF BLOOD CHLORIDE: CPT

## 2021-08-25 PROCEDURE — 85018 HEMOGLOBIN: CPT

## 2021-08-25 PROCEDURE — C9803: CPT

## 2021-08-25 PROCEDURE — 33233 REMOVAL OF PM GENERATOR: CPT

## 2021-08-25 PROCEDURE — 33235 REMOVAL PACEMAKER ELECTRODE: CPT

## 2021-08-25 PROCEDURE — G0463: CPT

## 2021-08-25 PROCEDURE — 71046 X-RAY EXAM CHEST 2 VIEWS: CPT | Mod: 26

## 2021-08-25 PROCEDURE — 82565 ASSAY OF CREATININE: CPT

## 2021-08-25 PROCEDURE — 86900 BLOOD TYPING SEROLOGIC ABO: CPT

## 2021-08-25 PROCEDURE — 80048 BASIC METABOLIC PNL TOTAL CA: CPT

## 2021-08-25 PROCEDURE — 84132 ASSAY OF SERUM POTASSIUM: CPT

## 2021-08-25 PROCEDURE — C1773: CPT

## 2021-08-25 PROCEDURE — C1898: CPT

## 2021-08-25 PROCEDURE — 82803 BLOOD GASES ANY COMBINATION: CPT

## 2021-08-25 PROCEDURE — C1889: CPT

## 2021-08-25 PROCEDURE — 33206 INSERT HEART PM ATRIAL: CPT | Mod: KX

## 2021-08-25 PROCEDURE — 82330 ASSAY OF CALCIUM: CPT

## 2021-08-25 PROCEDURE — 82962 GLUCOSE BLOOD TEST: CPT

## 2021-08-25 PROCEDURE — 82947 ASSAY GLUCOSE BLOOD QUANT: CPT

## 2021-08-25 PROCEDURE — 83036 HEMOGLOBIN GLYCOSYLATED A1C: CPT

## 2021-08-25 PROCEDURE — C1786: CPT

## 2021-08-25 PROCEDURE — 86901 BLOOD TYPING SEROLOGIC RH(D): CPT

## 2021-08-25 PROCEDURE — 86850 RBC ANTIBODY SCREEN: CPT

## 2021-08-25 PROCEDURE — 85014 HEMATOCRIT: CPT

## 2021-08-25 PROCEDURE — 71046 X-RAY EXAM CHEST 2 VIEWS: CPT

## 2021-08-25 PROCEDURE — C1887: CPT

## 2021-08-25 PROCEDURE — C1769: CPT

## 2021-08-25 PROCEDURE — C1892: CPT

## 2021-08-25 PROCEDURE — 83605 ASSAY OF LACTIC ACID: CPT

## 2021-08-25 PROCEDURE — C1894: CPT

## 2021-08-25 PROCEDURE — 71045 X-RAY EXAM CHEST 1 VIEW: CPT

## 2021-08-25 PROCEDURE — C2629: CPT

## 2021-08-25 PROCEDURE — 86923 COMPATIBILITY TEST ELECTRIC: CPT

## 2021-08-25 PROCEDURE — 84295 ASSAY OF SERUM SODIUM: CPT

## 2021-08-25 PROCEDURE — C9399: CPT

## 2021-08-25 PROCEDURE — 93005 ELECTROCARDIOGRAM TRACING: CPT

## 2021-08-25 RX ORDER — DILTIAZEM HCL 120 MG
1 CAPSULE, EXT RELEASE 24 HR ORAL
Qty: 0 | Refills: 0 | DISCHARGE

## 2021-08-25 RX ORDER — APIXABAN 2.5 MG/1
1 TABLET, FILM COATED ORAL
Qty: 0 | Refills: 0 | DISCHARGE

## 2021-08-25 RX ORDER — ACETAMINOPHEN 500 MG
2 TABLET ORAL
Qty: 0 | Refills: 0 | DISCHARGE
Start: 2021-08-25

## 2021-08-25 RX ORDER — DILTIAZEM HCL 120 MG
1 CAPSULE, EXT RELEASE 24 HR ORAL
Qty: 30 | Refills: 0
Start: 2021-08-25 | End: 2021-09-23

## 2021-08-25 RX ADMIN — Medication 650 MILLIGRAM(S): at 00:03

## 2021-08-25 RX ADMIN — Medication 650 MILLIGRAM(S): at 00:33

## 2021-08-25 RX ADMIN — Medication 180 MILLIGRAM(S): at 05:16

## 2021-08-25 RX ADMIN — Medication 650 MILLIGRAM(S): at 07:53

## 2021-08-25 RX ADMIN — LISINOPRIL 20 MILLIGRAM(S): 2.5 TABLET ORAL at 05:16

## 2021-08-25 RX ADMIN — Medication 1000 UNIT(S): at 07:54

## 2021-08-25 RX ADMIN — Medication 1 TABLET(S): at 07:54

## 2021-08-25 RX ADMIN — Medication 100 MILLIGRAM(S): at 02:22

## 2021-08-25 RX ADMIN — Medication 25 MILLIGRAM(S): at 05:16

## 2021-08-25 NOTE — DISCHARGE NOTE PROVIDER - NSDCCPCAREPLAN_GEN_ALL_CORE_FT
PRINCIPAL DISCHARGE DIAGNOSIS  Diagnosis: Pacemaker lead failure  Assessment and Plan of Treatment: Continue with follow-up visits to your electrophysiology team and with your home remote device monitoring (if applicable). Continue your medications as prescribed.      SECONDARY DISCHARGE DIAGNOSES  Diagnosis: HTN (hypertension)  Assessment and Plan of Treatment: Continue with your blood pressure medications; eat a heart healthy diet with low salt diet; exercise regularly (consult with your physician or cardiologist first); maintain a heart healthy weight; if you smoke - quit (A resource to help you stop smoking is the [facility] [employer] Control – phone number 168-972-3621.); include healthy ways to manage stress. Continue to follow with your primary care physician or cardiologist.    Diagnosis: HLD (hyperlipidemia)  Assessment and Plan of Treatment: Continue with your cholesterol medications. Eat a heart healthy diet that is low in saturated fats and salt, and includes whole grains, fruits, vegetables and lean protein; exercise regularly (consult with your physician or cardiologist first); maintain a heart healthy weight; if you smoke - quit (A resource to help you stop smoking is the [facility] [place] for TELOS Control – phone number [phone].). Continue to follow with your primary physician or cardiologist.    Diagnosis: DM type 2, goal HbA1c < 7%  Assessment and Plan of Treatment: Your Hemoglobin A1c level is   Continue to follow with your primary care MD or your endocrinologist.  Follow a heart healthy diabetic diet. If you check your fingerstick glucose at home, call your MD if it is greater than 250mg/dL on 2 occasions or less than 100mg/dL on 2 occasions. Know signs of low blood sugar, such as: dizziness, shakiness, sweating, confusion, hunger, nervousness-drink 4 ounces apple juice if occurs and call your doctor. Know early signs of high blood sugar, such as: frequent urination, increased thirst, blurry vision, fatigue, headache - call your doctor if this occurs. Follow with other practitioners to care for your diabetes, such as ophthalmologist and podiatrist.

## 2021-08-25 NOTE — DISCHARGE NOTE PROVIDER - CARE PROVIDER_API CALL
John Stoddard (MD)  Cardiac Electrophysiology; Cardiovascular Disease  [address] [place] [address]  Phone: [phone]  Fax: [phone]  Follow Up Time:

## 2021-08-25 NOTE — DISCHARGE NOTE PROVIDER - HOSPITAL COURSE
HPI:  76 yr old female with h/o breast ca 1995, former smoker, stable COPD/emphysema, HTN. HLD, paroxysmal afib on Eliquis, tachy-maryam syndrome, PPM ( 2009) generator change 2020. Patient reports pacemaker leads malfunction and presents to PST for scheduled pacemaker lead extraction /pacemaker implant on 8/24/2021.  Denies CP, palpitations, chronic SOB, does not require oxygen. Denies fever, chills, no acute complaints. Denies sick contacts. Covid PCR 8/21/2021 @ [place]. Ambulating without assistance.        ***Patient was instructed by cardiologist to hold Eliquis 3 days prior procedure *** (19 Aug 2021 14:29).    8/24 s/p PPM lead extraction and replacement, PPM extraction and replacement.    HPI:  76 yr old female with h/o breast ca 1995, former smoker, stable COPD/emphysema, HTN. HLD, paroxysmal afib on Eliquis, tachy-maryam syndrome, PPM ( 2009) generator change 2020. Patient reports pacemaker leads malfunction and presents to PST for scheduled pacemaker lead extraction /pacemaker implant on 8/24/2021.  Denies CP, palpitations, chronic SOB, does not require oxygen. Denies fever, chills, no acute complaints. Denies sick contacts. Covid PCR 8/21/2021 @ [place].   Ambulating without assistance.            8/24 s/p PPM lead extraction and replacement, PPM extraction and replacement.

## 2021-08-25 NOTE — DISCHARGE NOTE PROVIDER - CARE PROVIDERS DIRECT ADDRESSES
[email or web address]

## 2021-08-25 NOTE — DISCHARGE NOTE PROVIDER - NSDCFUADDAPPT_GEN_ALL_CORE_FT
Call your Cardiologist to arrange a follow up appointment Follow Dr Stoddard in 2 weeks for wound check  Resume Eliquis Saturday 8/28/21

## 2021-08-25 NOTE — DISCHARGE NOTE NURSING/CASE MANAGEMENT/SOCIAL WORK - PATIENT PORTAL LINK FT
You can access the FollowMyHealth Patient Portal offered by [facility] by registering at the following website: [email or web address]. By joining [employer]’s FollowMyHealth portal, you will also be able to view your health information using other applications (apps) compatible with our system.

## 2021-08-25 NOTE — DISCHARGE NOTE PROVIDER - NSDCMRMEDTOKEN_GEN_ALL_CORE_FT
acetaminophen 325 mg oral tablet: 2 tab(s) orally every 6 hours, As needed, Mild Pain (1 - 3)  Advair  mcg-21 mcg/inh inhalation aerosol: 2 puff(s) inhaled 2 times a day  benazepril 20 mg oral tablet: 1 tab(s) orally 2 times a day  Cardizem 60 mg oral tablet: 1 tab(s) orally once a day (at bedtime)  cholecalciferol 1000 intl units oral tablet: 2 tab(s) orally once a day  CoQ10: 100 milligram(s) orally once a day  Crestor 10 mg oral tablet: 1 tab(s) orally once a day  dilTIAZem 180 mg/24 hours oral capsule, extended release: 1 cap(s) orally once a day  Eliquis 5 mg oral tablet: 1 tab(s) orally 2 times a day, DO NOT RESTART TAKING UNTIL Saturday 8/28.        hydroCHLOROthiazide 25 mg oral tablet: 1 tab(s) orally once a day, As Needed ( BP &gt;135 )   Incruse Ellipta 62.5 mcg/inh inhalation powder: 1 puff(s) inhaled every 24 hours  metoprolol succinate 25 mg oral tablet, extended release: 1 tab(s) orally 2 times a day  Multiple Vitamins oral tablet: 1 tab(s) orally once a day  ProAir HFA 90 mcg/inh inhalation aerosol: 2 puff(s) inhaled every 6 hours, As Needed - for shortness of breath and/or wheezing  Xanax 0.5 mg oral tablet: 1 tab(s) orally 3 times a day, As Needed  zolpidem 10 mg oral tablet: 1 tab(s) orally once a day (at bedtime)

## 2021-08-25 NOTE — DISCHARGE NOTE PROVIDER - NSDCCPTREATMENT_GEN_ALL_CORE_FT
PRINCIPAL PROCEDURE  Procedure: Extraction of electrode lead of cardiac pacemaker with fluoroscopic guidance  Findings and Treatment: PPM lead extracted, PPM lead reimplant

## 2021-09-03 ENCOUNTER — APPOINTMENT (OUTPATIENT)
Dept: ELECTROPHYSIOLOGY | Facility: CLINIC | Age: 76
End: 2021-09-03

## 2021-09-07 ENCOUNTER — APPOINTMENT (OUTPATIENT)
Dept: ELECTROPHYSIOLOGY | Facility: CLINIC | Age: 76
End: 2021-09-07
Payer: MEDICARE

## 2021-09-07 VITALS
WEIGHT: 152 LBS | HEART RATE: 83 BPM | DIASTOLIC BLOOD PRESSURE: 76 MMHG | OXYGEN SATURATION: 97 % | SYSTOLIC BLOOD PRESSURE: 130 MMHG | HEIGHT: 64 IN | BODY MASS INDEX: 25.95 KG/M2

## 2021-09-07 PROCEDURE — 99024 POSTOP FOLLOW-UP VISIT: CPT

## 2021-09-07 PROCEDURE — 93000 ELECTROCARDIOGRAM COMPLETE: CPT | Mod: 59

## 2021-09-07 PROCEDURE — 93279 PRGRMG DEV EVAL PM/LDLS PM: CPT

## 2021-09-07 RX ORDER — TIOTROPIUM BROMIDE INHALATION SPRAY 3.12 UG/1
2.5 SPRAY, METERED RESPIRATORY (INHALATION)
Qty: 4 | Refills: 0 | Status: DISCONTINUED | COMMUNITY
Start: 2021-03-26 | End: 2021-09-07

## 2021-09-17 ENCOUNTER — NON-APPOINTMENT (OUTPATIENT)
Age: 76
End: 2021-09-17

## 2021-10-14 ENCOUNTER — APPOINTMENT (OUTPATIENT)
Dept: ELECTROPHYSIOLOGY | Facility: CLINIC | Age: 76
End: 2021-10-14

## 2021-10-20 ENCOUNTER — NON-APPOINTMENT (OUTPATIENT)
Age: 76
End: 2021-10-20

## 2021-10-20 ENCOUNTER — APPOINTMENT (OUTPATIENT)
Dept: ELECTROPHYSIOLOGY | Facility: CLINIC | Age: 76
End: 2021-10-20
Payer: MEDICARE

## 2021-10-20 VITALS
RESPIRATION RATE: 18 BRPM | HEIGHT: 64 IN | BODY MASS INDEX: 25.95 KG/M2 | OXYGEN SATURATION: 95 % | WEIGHT: 152 LBS | SYSTOLIC BLOOD PRESSURE: 141 MMHG | HEART RATE: 89 BPM | DIASTOLIC BLOOD PRESSURE: 85 MMHG

## 2021-10-20 PROCEDURE — 93279 PRGRMG DEV EVAL PM/LDLS PM: CPT

## 2021-10-20 RX ORDER — METOPROLOL SUCCINATE 100 MG/1
TABLET, EXTENDED RELEASE ORAL
Refills: 0 | Status: DISCONTINUED | COMMUNITY
End: 2021-10-20

## 2021-11-09 NOTE — ED ADULT TRIAGE NOTE - INTERNATIONAL TRAVEL
Writer met with the patient and completed a biopsychosocial assessment.  Patient was cooperative with assessment questioning.  Patient had appropriate eye contact, appropriate recall, and concentration.         No

## 2021-11-15 RX ORDER — DRONEDARONE 400 MG/1
400 TABLET, FILM COATED ORAL TWICE DAILY
Qty: 120 | Refills: 2 | Status: DISCONTINUED | COMMUNITY
Start: 2021-10-20 | End: 2021-11-15

## 2021-11-15 RX ORDER — METOPROLOL SUCCINATE 25 MG/1
25 TABLET, EXTENDED RELEASE ORAL TWICE DAILY
Refills: 0 | Status: ACTIVE | COMMUNITY
Start: 2021-11-15

## 2021-11-24 ENCOUNTER — NON-APPOINTMENT (OUTPATIENT)
Age: 76
End: 2021-11-24

## 2021-11-24 ENCOUNTER — APPOINTMENT (OUTPATIENT)
Dept: ELECTROPHYSIOLOGY | Facility: CLINIC | Age: 76
End: 2021-11-24
Payer: MEDICARE

## 2021-11-24 VITALS
BODY MASS INDEX: 24.55 KG/M2 | RESPIRATION RATE: 18 BRPM | OXYGEN SATURATION: 97 % | HEART RATE: 83 BPM | SYSTOLIC BLOOD PRESSURE: 136 MMHG | WEIGHT: 143 LBS | DIASTOLIC BLOOD PRESSURE: 80 MMHG

## 2021-11-24 PROCEDURE — 93279 PRGRMG DEV EVAL PM/LDLS PM: CPT

## 2021-11-24 PROCEDURE — 93000 ELECTROCARDIOGRAM COMPLETE: CPT | Mod: 59

## 2022-01-13 ENCOUNTER — APPOINTMENT (OUTPATIENT)
Dept: ELECTROPHYSIOLOGY | Facility: CLINIC | Age: 77
End: 2022-01-13

## 2022-02-01 NOTE — PROGRESS NOTE ADULT - SUBJECTIVE AND OBJECTIVE BOX
yes     76 yo female with HLD, COPD, h/p breast cancer, PAF on toprol and cardizem s/p PPM implant, presented with c/p sob and chest tightness for  1week and noted with PPM at [facility] since 9/12/20.   pro-BNP on admission elevated at 1500, also noted to be in afib with rates 80-90bpm.  In the past she is aware of afib but not severely symptomatic and usually afib episodes last only couple of hours at a time.    Shortness of breath and heart failure is likely related to pacemaker syndrome due to loss of atrioventricular synchrony after device reached [facility] and switched to VVI pacing.   s/p generator replacement    Vital Signs Last 24 Hrs  T(C): 36.4 (30 Sep 2020 12:07), Max: 37.5 (30 Sep 2020 10:54)  T(F): 97.5 (30 Sep 2020 12:07), Max: 99.5 (30 Sep 2020 10:54)  HR: 70 (30 Sep 2020 15:00) (62 - 91)  BP: 105/60 (30 Sep 2020 14:00) (100/58 - 144/84)  BP(mean): 70 (30 Sep 2020 14:00) (68 - 88)  RR: 21 (30 Sep 2020 15:00) (13 - 29)  SpO2: 96% (30 Sep 2020 15:00) (90% - 100%)    heent nc at [facility]  chest cta   cvs s1s2  abd soft nt nd + bs  extr no e/c/c                            15.9   9.33  )-----------( 247      ( 29 Sep 2020 07:01 )             48.3        76 yo female with HLD, COPD, h/p breast cancer, PAF on toprol and cardizem s/p PPM implant, presented with c/p sob and chest tightness for  1week and noted with PPM at [facility] since 9/12/20.   pro-BNP on admission elevated at 1500, also noted to be in afib with rates 80-90bpm.  In the past she is aware of afib but not severely symptomatic and usually afib episodes last only couple of hours at a time.    Shortness of breath and heart failure is likely related to pacemaker syndrome due to loss of atrioventricular synchrony after device reached [facility] and switched to VVI pacing.   s/p generator replacement    Vital Signs Last 24 Hrs  T(C): 36.4 (30 Sep 2020 12:07), Max: 37.5 (30 Sep 2020 10:54)  T(F): 97.5 (30 Sep 2020 12:07), Max: 99.5 (30 Sep 2020 10:54)  HR: 70 (30 Sep 2020 15:00) (62 - 91)  BP: 105/60 (30 Sep 2020 14:00) (100/58 - 144/84)  BP(mean): 70 (30 Sep 2020 14:00) (68 - 88)  RR: 21 (30 Sep 2020 15:00) (13 - 29)  SpO2: 96% (30 Sep 2020 15:00) (90% - 100%)    heent nc at [facility]  chest cta   cvs s1s2  abd soft nt nd + bs  extr no e/c/c                            15.9   9.33  )-----------( 247      ( 29 Sep 2020 07:01 )             48.3       Acute diastolic CHF due to pacemaker [facility]    JORGE POA    PAF yes yes

## 2022-02-23 ENCOUNTER — APPOINTMENT (OUTPATIENT)
Dept: ELECTROPHYSIOLOGY | Facility: CLINIC | Age: 77
End: 2022-02-23
Payer: MEDICARE

## 2022-02-24 ENCOUNTER — NON-APPOINTMENT (OUTPATIENT)
Age: 77
End: 2022-02-24

## 2022-02-24 PROCEDURE — 93296 REM INTERROG EVL PM/IDS: CPT

## 2022-02-24 PROCEDURE — 93294 REM INTERROG EVL PM/LDLS PM: CPT

## 2022-03-03 NOTE — ED ADULT NURSE NOTE - CHIEF COMPLAINT QUOTE
c/o shortness of breath for past 3 days, worse today, pt has history of emphysema, pacemaker need battery change, O2 sat in triage 94% on RA, pulse 65
Negative Screen

## 2022-03-31 ENCOUNTER — APPOINTMENT (OUTPATIENT)
Dept: ORTHOPEDIC SURGERY | Facility: CLINIC | Age: 77
End: 2022-03-31
Payer: MEDICARE

## 2022-03-31 VITALS — HEIGHT: 63 IN | WEIGHT: 159 LBS | BODY MASS INDEX: 28.17 KG/M2

## 2022-03-31 PROCEDURE — 73560 X-RAY EXAM OF KNEE 1 OR 2: CPT | Mod: 26,LT

## 2022-03-31 PROCEDURE — 99213 OFFICE O/P EST LOW 20 MIN: CPT

## 2022-04-01 DIAGNOSIS — Z86.79 PERSONAL HISTORY OF OTHER DISEASES OF THE CIRCULATORY SYSTEM: ICD-10-CM

## 2022-04-01 DIAGNOSIS — Z85.9 PERSONAL HISTORY OF MALIGNANT NEOPLASM, UNSPECIFIED: ICD-10-CM

## 2022-04-01 DIAGNOSIS — Z87.09 PERSONAL HISTORY OF OTHER DISEASES OF THE RESPIRATORY SYSTEM: ICD-10-CM

## 2022-04-01 DIAGNOSIS — Z60.2 PROBLEMS RELATED TO LIVING ALONE: ICD-10-CM

## 2022-04-01 DIAGNOSIS — Z82.69 FAMILY HISTORY OF OTHER DISEASES OF THE MUSCULOSKELETAL SYSTEM AND CONNECTIVE TISSUE: ICD-10-CM

## 2022-04-01 DIAGNOSIS — Z87.39 PERSONAL HISTORY OF OTHER DISEASES OF THE MUSCULOSKELETAL SYSTEM AND CONNECTIVE TISSUE: ICD-10-CM

## 2022-04-01 SDOH — SOCIAL STABILITY - SOCIAL INSECURITY: PROBLEMS RELATED TO LIVING ALONE: Z60.2

## 2022-04-05 NOTE — REVIEW OF SYSTEMS
[Joint Pain] : joint pain [Joint Stiffness] : joint stiffness [Feeling Tired] : fatigue [Recent Weight Gain (___ Lbs)] : recent ~M [unfilled] lbs weight gain [SOB at rest] : shortness of breath at rest [SOB on Exertion] : shortness of breath on exertion [Negative] : Heme/Lymph

## 2022-04-06 NOTE — HISTORY OF PRESENT ILLNESS
[8] : the ailment interference is 8/10 [3] : the ailment interference is 3/10 [6] : the ailment interference is 6/10 [de-identified] : The patient comes in today with complaints of left knee pain. The patient states the onset/injury occurred in 2010.  This injury is not work related or due to an automobile accident.  The patient states the pain is intermittent.  The patient describes the pain as sharp and achy.  The patient notes rest makes her symptoms better, while walking and bending makes her symptoms worse.  The patient indicates a pain level of 5 on a pain scale of 0-10.    [] : No

## 2022-04-06 NOTE — PHYSICAL EXAM
[de-identified] : Left Knee: \par Range of Motion in Degrees	\par 	                  Claimant:	Normal:	\par Flexion Active	  135 	                135-degrees	\par Flexion Passive	  135	                135-degrees	\par Extension Active	  0-5	                0-5-degrees	\par Extension Passive	  0-5	                0-5-degrees	\par \par No weakness to flexion/extension.  No evidence of instability in the AP plane or varus or valgus stress.  Negative  Lachman.  Negative pivot shift.  Negative anterior drawer test.  Negative posterior drawer test.  Negative Matti.  Negative Apley grind.  No medial or lateral joint line tenderness.  Positive tenderness over the medial and lateral facet of the patella.  Positive patellofemoral crepitations.  No lateral tilting patella.  No patella apprehension.  Positive crepitation in the medial and lateral femoral condyle.  No proximal or distal swelling, edema or tenderness.  No gross motor or sensory deficits.  Mild intra-articular swelling.  2+ DP and PT pulses.  No varus or valgus malalignment.  Skin is intact.  No rashes, scars or lesions.  \par \par Right Knee: \par Range of Motion in Degrees	\par 	                  Claimant:	Normal:	\par Flexion Active	  135 	                135-degrees	\par Flexion Passive	  135	                135-degrees	\par Extension Active	  0-5	                0-5-degrees	\par Extension Passive	  0-5	                0-5-degrees	\par \par No weakness to flexion/extension.  No evidence of instability in the AP plane or varus or valgus stress.  Negative  Lachman.  Negative pivot shift.  Negative anterior drawer test.  Negative posterior drawer test.  Negative Matti.  Negative Apley grind.  No medial or lateral joint line tenderness.  No tenderness over the medial and lateral facet of the patella.  No patellofemoral crepitations.  No lateral tilting patella.  No patellar apprehension.  No crepitation in the medial and lateral femoral condyle.  No proximal or distal swelling, edema or tenderness.  No gross motor or sensory deficits.  No intra-articular swelling.  2+ DP and PT pulses. No varus or valgus malalignment.  Skin is intact.  No rashes, scars or lesions.  \par    [de-identified] : Ambulating with a slightly antalgic to antalgic gait.  Station:  Normal.  [de-identified] : Appearance:  Well-developed, well-nourished female in no acute distress.\par   [de-identified] : Radiographs, two views of the left knee, reveals bone-on-bone osteoarthritis in the medial compartment.

## 2022-04-06 NOTE — ADDENDUM
[FreeTextEntry1] : This note was written by Soraya Guan on 04/05/2022 acting as scribe for Dorita Kennedy, OTR/L, PA

## 2022-04-06 NOTE — DISCUSSION/SUMMARY
[de-identified] : At this time, due to osteoarthritis of the left knee, the patient will be setup for a surgical procedure for a total knee replacement. She is advised that Dr. Mast is not going to be doing any surgical procedures until May.  All the risks and benefits of the surgery, including, but not limited to, anesthesia, infection, nerve and/or vascular injury, need for further surgery, DVT, PE and perioperative death,  were all discussed with the patient at length. I reviewed the plan of care as well as a model of a total knee implant equivalent to the one that will be used for their total knee joint replacement. The patient agreed to the plan of care as well as the use of implants for their knee total joint replacement. In light of these, patient wishes to proceed.  Patient will be scheduled at this time. She will return to the office to meet Dr. Mast at the end of April and he will answer any other questions she may have and then she will undergo her left total knee arthroplasty in May.  \par

## 2022-04-15 ENCOUNTER — APPOINTMENT (OUTPATIENT)
Dept: ELECTROPHYSIOLOGY | Facility: CLINIC | Age: 77
End: 2022-04-15
Payer: MEDICARE

## 2022-04-15 VITALS
BODY MASS INDEX: 28.7 KG/M2 | HEART RATE: 88 BPM | RESPIRATION RATE: 16 BRPM | WEIGHT: 162 LBS | DIASTOLIC BLOOD PRESSURE: 83 MMHG | OXYGEN SATURATION: 95 % | HEIGHT: 63 IN | SYSTOLIC BLOOD PRESSURE: 135 MMHG

## 2022-04-15 PROCEDURE — 99214 OFFICE O/P EST MOD 30 MIN: CPT

## 2022-04-15 PROCEDURE — 93279 PRGRMG DEV EVAL PM/LDLS PM: CPT

## 2022-04-15 NOTE — DISCUSSION/SUMMARY
[FreeTextEntry1] : 77-year-old female with history of breast ca, former smoker COPD HTN paroxysmal atrial fibrillation on Eliquis tachybradycardia syndrome with PPM since 2009 who had pacemaker lead failure and underwent pacemaker lead extraction and placement with new system single chamber Atrial PPM MDT on 8/24/2021.\par PPM interrogation shows multiple episodes of Afib every few days lasting up to 3h. Atrial threshold 0.5V@0.4ms, sensing 1.9mV, imepdance 690 ohms \par -Rhythm control strategy is beneficial for this patient, she did not tolerate multaq. \par Discussed both antiarrhythmic drugs and catheter ablation procedure option. Pt prefers to undergo ablation procedure for treatment of AF and avoid AAD if possible. Discussed i/r/b/a of each treatment option in details including risk of esophageal ulcer, cardiac temponade, cva and all questions were answered. Patient participated in shared decision discussion. \par -encouraged lifestyle modification exercise, weight loss \par -Cardiac CT\par -continue uninterrupted anticoagulation hold eliquis morning of procedure\par -Hold hypertension medications on the morning of procedure day\par Total length of time spent with this patient was 45 minutes and more then half of the time was spent face to face with the patient as well as counseling and coordination of care as stated above.\par \par \par  \par \par -Continue lifelong anticoagulation controlled HTN exercise as tolerated\par \par Total length of time spent with this patient was 45 minutes and more then half of the time was spent face to face with the patient as well as counseling and coordination of care as stated above.\par

## 2022-04-15 NOTE — HISTORY OF PRESENT ILLNESS
[FreeTextEntry1] : 77-year-old female with history of breast ca s/p left mastectomy, former smoker COPD HTN paroxysmal atrial fibrillation on Eliquis tachybradycardia syndrome with PPM since 2009 who had pacemaker lead failure and underwent pacemaker lead system extraction and new single chamber PPM Medtronic (RA lead only) implant on 8/24/2021 by Dr Kelly. Patient remains on Eliquis 5 mg twice daily denies any bleeding.\par Pt is highly symptomatic from AF with palpitations with occasional light headiness, no syncope. She did not tolerate multaq.  Denies exertional CP. She has baseline bowles with walking.

## 2022-04-19 ENCOUNTER — APPOINTMENT (OUTPATIENT)
Dept: ORTHOPEDIC SURGERY | Facility: CLINIC | Age: 77
End: 2022-04-19

## 2022-04-19 DIAGNOSIS — M17.12 UNILATERAL PRIMARY OSTEOARTHRITIS, LEFT KNEE: ICD-10-CM

## 2022-04-19 PROCEDURE — 99214 OFFICE O/P EST MOD 30 MIN: CPT

## 2022-04-25 VITALS — HEIGHT: 63 IN | BODY MASS INDEX: 28.7 KG/M2 | WEIGHT: 162 LBS

## 2022-04-25 NOTE — PHYSICAL EXAM
[de-identified] : Left Knee: Range of Motion in Degrees	\par 	                  Claimant:	Normal:	\par Flexion Active	  135 	                135-degrees	\par Flexion Passive	  135	                135-degrees	\par Extension Active	  0-5	                0-5-degrees	\par Extension Passive	  0-5	                0-5-degrees	\par \par No weakness to flexion/extension.  No evidence of instability in the AP plane or varus or valgus stress.  Negative  Lachman.  Negative pivot shift.  Negative anterior drawer test.  Negative posterior drawer test.  Negative Matti.  Negative Apley grind.  No medial or lateral joint line tenderness.  Positive tenderness over the medial and lateral facet of the patella.  Positive patellofemoral crepitations.  No lateral tilting patella.  No patella apprehension.  Positive crepitation in the medial and lateral femoral condyle.  No proximal or distal swelling, edema or tenderness.  No gross motor or sensory deficits.  Mild intra-articular swelling.  2+ DP and PT pulses.  No varus or valgus malalignment.  Skin is intact.  No rashes, scars or lesions.  \par \par   [de-identified] : Gait and Station:  Ambulating with a slightly antalgic to antalgic gait.  Normal Station.  [de-identified] : Appearance:  Well developed, well-nourished female in no acute distress.\par

## 2022-04-25 NOTE — ADDENDUM
[FreeTextEntry1] : This note was written by Koko Sepulveda on 04/25/2022, acting as a scribe for Jerson Mast III, MD

## 2022-04-25 NOTE — DISCUSSION/SUMMARY
[de-identified] : At this time, due to end-stage osteoarthritis with bone-on-bone articulations of the left knee, we had a long discussion concerning a left total knee arthroplasty.  All the risks and benefits of the surgery, including, but not limited to, anesthesia, infection, nerve and/or vascular injury, need for further surgery, DVT, PE and perioperative death, were all discussed with the patient at length.  In light of these, the patient wishes to proceed.  The patient will be scheduled at this time. \par \par I reviewed the plan of care, as well as a model of a total knee implant equivalent to the one that will be used for their total knee joint replacement.\par \par The patient agreed to the plan of care, as well as the use of implants for their knee total joint replacement.\par \par \par \par \par \par

## 2022-04-25 NOTE — HISTORY OF PRESENT ILLNESS
[de-identified] : The patient comes in today for a preoperative discussion concerning a left total knee.\par

## 2022-06-22 ENCOUNTER — APPOINTMENT (OUTPATIENT)
Dept: BREAST CENTER | Facility: CLINIC | Age: 77
End: 2022-06-22
Payer: MEDICARE

## 2022-06-22 VITALS
BODY MASS INDEX: 28.7 KG/M2 | WEIGHT: 162 LBS | HEIGHT: 63 IN | DIASTOLIC BLOOD PRESSURE: 82 MMHG | SYSTOLIC BLOOD PRESSURE: 127 MMHG | HEART RATE: 83 BPM

## 2022-06-22 DIAGNOSIS — Z80.8 FAMILY HISTORY OF MALIGNANT NEOPLASM OF OTHER ORGANS OR SYSTEMS: ICD-10-CM

## 2022-06-22 DIAGNOSIS — Z12.39 ENCOUNTER FOR OTHER SCREENING FOR MALIGNANT NEOPLASM OF BREAST: ICD-10-CM

## 2022-06-22 DIAGNOSIS — Z13.79 ENCOUNTER FOR OTHER SCREENING FOR GENETIC AND CHROMOSOMAL ANOMALIES: ICD-10-CM

## 2022-06-22 DIAGNOSIS — R92.2 INCONCLUSIVE MAMMOGRAM: ICD-10-CM

## 2022-06-22 DIAGNOSIS — Z80.42 FAMILY HISTORY OF MALIGNANT NEOPLASM OF PROSTATE: ICD-10-CM

## 2022-06-22 PROCEDURE — 99214 OFFICE O/P EST MOD 30 MIN: CPT

## 2022-06-22 NOTE — HISTORY OF PRESENT ILLNESS
[FreeTextEntry1] : Ms. Bright is a 77 year old woman here for a follow-up for surveillance for breast cancer. Her breast history is significant for\par \par 1.) Left breast cancer diagnosed in 1995 at age 50, stage I\par - s/p L mastectomy, axillary node dissection (11/30/95, Dr. Fair) = per patient, 0/13 nodes\par - s/p chemotherapy ([facility], Dr. Valle)\par - s/p tamoxifen x 5 years\par \par She has no breast complaints. She has some left arm swelling that is improved with exercise. She has upcoming knee surgery and is not interested in lymphedema therapy until after that is done.  \par \par Her family history is not significant for any breast cancer.

## 2022-06-22 NOTE — CONSULT LETTER
[Dear  ___] : Dear  [unfilled], [Courtesy Letter:] : I had the pleasure of seeing your patient, [unfilled], in my office today. [Please see my note below.] : Please see my note below. [Consult Closing:] : Thank you very much for allowing me to participate in the care of this patient.  If you have any questions, please do not hesitate to contact me. [Sincerely,] : Sincerely, [FreeTextEntry3] : Kelley Baer MD FACS

## 2022-06-22 NOTE — PHYSICAL EXAM
[Normocephalic] : normocephalic [Sclera nonicteric] : sclera nonicteric [Supple] : supple [No Supraclavicular Adenopathy] : no supraclavicular adenopathy [No Cervical Adenopathy] : no cervical adenopathy [Clear to Auscultation Bilat] : clear to auscultation bilaterally [Normal Sinus Rhythm] : normal sinus rhythm [Murmurs] : no murmurs [Asymmetrical] : asymmetrical [Grade 2] : Ptosis Grade 2 [No dominant masses] : no dominant masses in right breast  [No dominant masses] : no dominant masses left breast [No Nipple Retraction] : no right nipple retraction [No Nipple Discharge] : no right nipple discharge [No Axillary Lymphadenopathy] : no left axillary lymphadenopathy [Soft] : abdomen soft [No Edema] : no edema [No Swelling] : no swelling [No Rashes] : no rashes [No Ulceration] : no ulceration [de-identified] : Transverse scar

## 2022-06-25 ENCOUNTER — EMERGENCY (EMERGENCY)
Facility: HOSPITAL | Age: 77
LOS: 0 days | Discharge: HOME | End: 2022-06-26
Attending: EMERGENCY MEDICINE
Payer: MEDICARE

## 2022-06-25 VITALS
RESPIRATION RATE: 18 BRPM | WEIGHT: 160.06 LBS | HEART RATE: 77 BPM | SYSTOLIC BLOOD PRESSURE: 145 MMHG | TEMPERATURE: 98 F | HEIGHT: 63 IN | DIASTOLIC BLOOD PRESSURE: 75 MMHG | OXYGEN SATURATION: 95 %

## 2022-06-25 DIAGNOSIS — I49.5 SICK SINUS SYNDROME: ICD-10-CM

## 2022-06-25 DIAGNOSIS — D72.829 ELEVATED WHITE BLOOD CELL COUNT, UNSPECIFIED: ICD-10-CM

## 2022-06-25 DIAGNOSIS — Z79.01 LONG TERM (CURRENT) USE OF ANTICOAGULANTS: ICD-10-CM

## 2022-06-25 DIAGNOSIS — Z85.3 PERSONAL HISTORY OF MALIGNANT NEOPLASM OF BREAST: ICD-10-CM

## 2022-06-25 DIAGNOSIS — Z87.891 PERSONAL HISTORY OF NICOTINE DEPENDENCE: ICD-10-CM

## 2022-06-25 DIAGNOSIS — Z90.12 ACQUIRED ABSENCE OF LEFT BREAST AND NIPPLE: Chronic | ICD-10-CM

## 2022-06-25 DIAGNOSIS — Z98.890 OTHER SPECIFIED POSTPROCEDURAL STATES: Chronic | ICD-10-CM

## 2022-06-25 DIAGNOSIS — Z88.8 ALLERGY STATUS TO OTHER DRUGS, MEDICAMENTS AND BIOLOGICAL SUBSTANCES: ICD-10-CM

## 2022-06-25 DIAGNOSIS — J44.9 CHRONIC OBSTRUCTIVE PULMONARY DISEASE, UNSPECIFIED: ICD-10-CM

## 2022-06-25 DIAGNOSIS — M19.90 UNSPECIFIED OSTEOARTHRITIS, UNSPECIFIED SITE: ICD-10-CM

## 2022-06-25 DIAGNOSIS — R79.89 OTHER SPECIFIED ABNORMAL FINDINGS OF BLOOD CHEMISTRY: ICD-10-CM

## 2022-06-25 DIAGNOSIS — I48.91 UNSPECIFIED ATRIAL FIBRILLATION: ICD-10-CM

## 2022-06-25 DIAGNOSIS — E78.5 HYPERLIPIDEMIA, UNSPECIFIED: ICD-10-CM

## 2022-06-25 DIAGNOSIS — R00.0 TACHYCARDIA, UNSPECIFIED: ICD-10-CM

## 2022-06-25 DIAGNOSIS — R04.0 EPISTAXIS: ICD-10-CM

## 2022-06-25 DIAGNOSIS — Z90.49 ACQUIRED ABSENCE OF OTHER SPECIFIED PARTS OF DIGESTIVE TRACT: Chronic | ICD-10-CM

## 2022-06-25 DIAGNOSIS — R73.03 PREDIABETES: ICD-10-CM

## 2022-06-25 DIAGNOSIS — Z20.822 CONTACT WITH AND (SUSPECTED) EXPOSURE TO COVID-19: ICD-10-CM

## 2022-06-25 DIAGNOSIS — Z95.0 PRESENCE OF CARDIAC PACEMAKER: Chronic | ICD-10-CM

## 2022-06-25 LAB
ALBUMIN SERPL ELPH-MCNC: 4 G/DL — SIGNIFICANT CHANGE UP (ref 3.3–5)
ALP SERPL-CCNC: 83 U/L — SIGNIFICANT CHANGE UP (ref 40–120)
ALT FLD-CCNC: 27 U/L — SIGNIFICANT CHANGE UP (ref 12–78)
ANION GAP SERPL CALC-SCNC: 7 MMOL/L — SIGNIFICANT CHANGE UP (ref 5–17)
AST SERPL-CCNC: 15 U/L — SIGNIFICANT CHANGE UP (ref 15–37)
BASOPHILS # BLD AUTO: 0.11 K/UL — SIGNIFICANT CHANGE UP (ref 0–0.2)
BASOPHILS NFR BLD AUTO: 1 % — SIGNIFICANT CHANGE UP (ref 0–2)
BILIRUB SERPL-MCNC: 0.7 MG/DL — SIGNIFICANT CHANGE UP (ref 0.2–1.2)
BUN SERPL-MCNC: 24 MG/DL — HIGH (ref 7–23)
CALCIUM SERPL-MCNC: 9.7 MG/DL — SIGNIFICANT CHANGE UP (ref 8.5–10.1)
CHLORIDE SERPL-SCNC: 107 MMOL/L — SIGNIFICANT CHANGE UP (ref 96–108)
CO2 SERPL-SCNC: 23 MMOL/L — SIGNIFICANT CHANGE UP (ref 22–31)
CREAT SERPL-MCNC: 1.43 MG/DL — HIGH (ref 0.5–1.3)
EGFR: 38 ML/MIN/1.73M2 — LOW
EOSINOPHIL # BLD AUTO: 0.08 K/UL — SIGNIFICANT CHANGE UP (ref 0–0.5)
EOSINOPHIL NFR BLD AUTO: 0.7 % — SIGNIFICANT CHANGE UP (ref 0–6)
GLUCOSE SERPL-MCNC: 127 MG/DL — HIGH (ref 70–99)
HCT VFR BLD CALC: 46.4 % — HIGH (ref 34.5–45)
HGB BLD-MCNC: 16.2 G/DL — HIGH (ref 11.5–15.5)
IMM GRANULOCYTES NFR BLD AUTO: 0.6 % — SIGNIFICANT CHANGE UP (ref 0–1.5)
LYMPHOCYTES # BLD AUTO: 1.64 K/UL — SIGNIFICANT CHANGE UP (ref 1–3.3)
LYMPHOCYTES # BLD AUTO: 14.9 % — SIGNIFICANT CHANGE UP (ref 13–44)
MAGNESIUM SERPL-MCNC: 2 MG/DL — SIGNIFICANT CHANGE UP (ref 1.6–2.6)
MCHC RBC-ENTMCNC: 31.1 PG — SIGNIFICANT CHANGE UP (ref 27–34)
MCHC RBC-ENTMCNC: 34.9 GM/DL — SIGNIFICANT CHANGE UP (ref 32–36)
MCV RBC AUTO: 89.1 FL — SIGNIFICANT CHANGE UP (ref 80–100)
MONOCYTES # BLD AUTO: 1.26 K/UL — HIGH (ref 0–0.9)
MONOCYTES NFR BLD AUTO: 11.4 % — SIGNIFICANT CHANGE UP (ref 2–14)
NEUTROPHILS # BLD AUTO: 7.86 K/UL — HIGH (ref 1.8–7.4)
NEUTROPHILS NFR BLD AUTO: 71.4 % — SIGNIFICANT CHANGE UP (ref 43–77)
NT-PROBNP SERPL-SCNC: 61 PG/ML — SIGNIFICANT CHANGE UP (ref 0–450)
PLATELET # BLD AUTO: 335 K/UL — SIGNIFICANT CHANGE UP (ref 150–400)
POTASSIUM SERPL-MCNC: 3.9 MMOL/L — SIGNIFICANT CHANGE UP (ref 3.5–5.3)
POTASSIUM SERPL-SCNC: 3.9 MMOL/L — SIGNIFICANT CHANGE UP (ref 3.5–5.3)
PROT SERPL-MCNC: 7.6 GM/DL — SIGNIFICANT CHANGE UP (ref 6–8.3)
RBC # BLD: 5.21 M/UL — HIGH (ref 3.8–5.2)
RBC # FLD: 13.2 % — SIGNIFICANT CHANGE UP (ref 10.3–14.5)
SARS-COV-2 RNA SPEC QL NAA+PROBE: SIGNIFICANT CHANGE UP
SODIUM SERPL-SCNC: 137 MMOL/L — SIGNIFICANT CHANGE UP (ref 135–145)
TROPONIN I, HIGH SENSITIVITY RESULT: 4.79 NG/L — SIGNIFICANT CHANGE UP
WBC # BLD: 11.02 K/UL — HIGH (ref 3.8–10.5)
WBC # FLD AUTO: 11.02 K/UL — HIGH (ref 3.8–10.5)

## 2022-06-25 PROCEDURE — 80048 BASIC METABOLIC PNL TOTAL CA: CPT

## 2022-06-25 PROCEDURE — 93005 ELECTROCARDIOGRAM TRACING: CPT

## 2022-06-25 PROCEDURE — 71045 X-RAY EXAM CHEST 1 VIEW: CPT

## 2022-06-25 PROCEDURE — 71045 X-RAY EXAM CHEST 1 VIEW: CPT | Mod: 26

## 2022-06-25 PROCEDURE — G0378: CPT

## 2022-06-25 PROCEDURE — 83880 ASSAY OF NATRIURETIC PEPTIDE: CPT

## 2022-06-25 PROCEDURE — 83735 ASSAY OF MAGNESIUM: CPT

## 2022-06-25 PROCEDURE — 96361 HYDRATE IV INFUSION ADD-ON: CPT

## 2022-06-25 PROCEDURE — 85025 COMPLETE CBC W/AUTO DIFF WBC: CPT

## 2022-06-25 PROCEDURE — 99285 EMERGENCY DEPT VISIT HI MDM: CPT | Mod: 25

## 2022-06-25 PROCEDURE — 96374 THER/PROPH/DIAG INJ IV PUSH: CPT

## 2022-06-25 PROCEDURE — U0005: CPT

## 2022-06-25 PROCEDURE — 80053 COMPREHEN METABOLIC PANEL: CPT

## 2022-06-25 PROCEDURE — 94640 AIRWAY INHALATION TREATMENT: CPT

## 2022-06-25 PROCEDURE — 96375 TX/PRO/DX INJ NEW DRUG ADDON: CPT

## 2022-06-25 PROCEDURE — U0003: CPT

## 2022-06-25 PROCEDURE — 93010 ELECTROCARDIOGRAM REPORT: CPT

## 2022-06-25 PROCEDURE — 85027 COMPLETE CBC AUTOMATED: CPT

## 2022-06-25 PROCEDURE — 12345: CPT | Mod: NC

## 2022-06-25 PROCEDURE — 36415 COLL VENOUS BLD VENIPUNCTURE: CPT

## 2022-06-25 PROCEDURE — 84484 ASSAY OF TROPONIN QUANT: CPT

## 2022-06-25 PROCEDURE — 99285 EMERGENCY DEPT VISIT HI MDM: CPT

## 2022-06-25 RX ORDER — SODIUM CHLORIDE 9 MG/ML
500 INJECTION INTRAMUSCULAR; INTRAVENOUS; SUBCUTANEOUS ONCE
Refills: 0 | Status: COMPLETED | OUTPATIENT
Start: 2022-06-25 | End: 2022-06-25

## 2022-06-25 RX ORDER — ALBUTEROL 90 UG/1
1 AEROSOL, METERED ORAL ONCE
Refills: 0 | Status: COMPLETED | OUTPATIENT
Start: 2022-06-25 | End: 2022-06-25

## 2022-06-25 RX ORDER — MAGNESIUM SULFATE 500 MG/ML
1 VIAL (ML) INJECTION ONCE
Refills: 0 | Status: COMPLETED | OUTPATIENT
Start: 2022-06-25 | End: 2022-06-25

## 2022-06-25 RX ORDER — DEXAMETHASONE 0.5 MG/5ML
5 ELIXIR ORAL ONCE
Refills: 0 | Status: COMPLETED | OUTPATIENT
Start: 2022-06-25 | End: 2022-06-25

## 2022-06-25 RX ADMIN — ALBUTEROL 1 PUFF(S): 90 AEROSOL, METERED ORAL at 19:49

## 2022-06-25 RX ADMIN — SODIUM CHLORIDE 500 MILLILITER(S): 9 INJECTION INTRAMUSCULAR; INTRAVENOUS; SUBCUTANEOUS at 18:30

## 2022-06-25 RX ADMIN — Medication 100 GRAM(S): at 23:08

## 2022-06-25 RX ADMIN — Medication 5 MILLIGRAM(S): at 19:49

## 2022-06-25 NOTE — ED PROVIDER NOTE - CONSTITUTIONAL, MLM
normal... Well appearing, awake, alert, oriented to person, place, time/situation and in no apparent distress. Well appearing, awake, alert, oriented to person, place, time/situation and in no apparent distress. AAOx4.

## 2022-06-25 NOTE — ED ADULT NURSE NOTE - NSIMPLEMENTINTERV_GEN_ALL_ED
Implemented All Universal Safety Interventions:  [place] to call system. Call bell, personal items and telephone within reach. Instruct patient to call for assistance. Room bathroom lighting operational. Non-slip footwear when patient is off stretcher. Physically safe environment: no spills, clutter or unnecessary equipment. Stretcher in lowest position, wheels locked, appropriate side rails in place.

## 2022-06-25 NOTE — ED PROVIDER NOTE - NS_ ATTENDINGSCRIBEDETAILS _ED_A_ED_FT
I Jerrell Floyd MD saw and examined the patient. Scribe documented for me and under my supervision. I have modified the scribe's documentation where necessary to reflect my history, physical exam and other relevant documentations pertinent to the care of the patient.

## 2022-06-25 NOTE — ED PROVIDER NOTE - NSICDXPASTMEDICALHX_GEN_ALL_CORE_FT
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

## 2022-06-25 NOTE — ED PROVIDER NOTE - PROGRESS NOTE DETAILS
Katia AVILA for ED attending, Dr. Floyd: Pt still tachypneic, sat 89 to 90 percent, history of COPD, no active epistaxis, however pt unable ambulate w/o getting SOB. Will admit for COPD exacerbation. Katia AVILA for ED attending, Dr. Floyd: Pt still tachypneic, sat 89 to 90 percent, history of COPD, no active epistaxis, however pt unable ambulate w/o getting SOB, GARCIA. Not safe for discharge. Will admit for COPD exacerbation.

## 2022-06-25 NOTE — ED PROVIDER NOTE - MUSCULOSKELETAL, MLM
Spine appears normal, range of motion is not limited, no muscle or joint tenderness Spine appears normal, range of motion is not limited, no muscle or joint tenderness. 5/5 strength on flexion and extension of all limbs. No nuchal rigidity No saddle anesthesia.

## 2022-06-25 NOTE — ED PROVIDER NOTE - OBJECTIVE STATEMENT
76 y/o F w/ PMHx of breast ca 1995, former smoker, stable COPD/emphysema not on home O2, HTN. HLD, paroxysmal afib on Eliquis, tachy-maryam syndrome, PPM ( 2009) generator change 2020 presents to ED c/o persistent epistaxis. Pt reports history of epistaxis over the past few months after using Flonase. Pt states she had previously cauterization which she states stopped the bleeding for a few weeks. Denies recent fall or trauma, lightheadedness, dizziness. Pt states her blood pressure has been lower than usual recently. Pt O2 level ranges from 93-97%. Pt is on Eliquis. ENT: Dr. Verdugo.

## 2022-06-25 NOTE — ED PROVIDER NOTE - NEUROLOGICAL, MLM
Alert and oriented, no focal deficits, no motor or sensory deficits. Alert and oriented, no focal deficits, no motor or sensory deficits. CNs 2-12 intact. NIH inferred to be zero, GCS=15

## 2022-06-25 NOTE — ED ADULT TRIAGE NOTE - CHIEF COMPLAINT QUOTE
Pt brought in by ambulance from home for epistasis that has been intermittent over the past several months. Pt states that she has been cauterized once, but it did not hold. Pt states that last epistasis was June 5. Today pt states that she had a "stuffy nose" and instead of blowing it (because she was afraid that it would bleed) she used a breathe right strip and it bled anyway. Pt on Eliquis. hx: Emphysema Allergies: Mobic

## 2022-06-25 NOTE — ED PROVIDER NOTE - CLINICAL SUMMARY MEDICAL DECISION MAKING FREE TEXT BOX
Labs, XR and reassessment Labs, XR and reassessment    Mariel NOGUERA: Patient is unable to ambulate without becoming hypoxic, tachypneic, not safe for dc.

## 2022-06-25 NOTE — ED PROVIDER NOTE - ENMT, MLM
Airway patent, Nasal mucosa clear. Mouth with normal mucosa. Throat has no vesicles, no oropharyngeal exudates and uvula is midline. Airway patent, Mouth with normal mucosa. Throat has no vesicles, no oropharyngeal exudates and uvula is midline +Dry blood in R nares, dry blood in oral pharynx Airway patent, Mouth with normal mucosa. Throat has no vesicles, no oropharyngeal exudates and uvula is midline +Dry blood in R nare, dry blood in oral pharynx. No active hemorrhage in pharynx and the nares. No septal laceration, hematoma or abnormalities.

## 2022-06-26 ENCOUNTER — TRANSCRIPTION ENCOUNTER (OUTPATIENT)
Age: 77
End: 2022-06-26

## 2022-06-26 VITALS — OXYGEN SATURATION: 98 %

## 2022-06-26 LAB
ANION GAP SERPL CALC-SCNC: 9 MMOL/L — SIGNIFICANT CHANGE UP (ref 5–17)
BUN SERPL-MCNC: 23 MG/DL — SIGNIFICANT CHANGE UP (ref 7–23)
CALCIUM SERPL-MCNC: 9.9 MG/DL — SIGNIFICANT CHANGE UP (ref 8.5–10.1)
CHLORIDE SERPL-SCNC: 109 MMOL/L — HIGH (ref 96–108)
CO2 SERPL-SCNC: 21 MMOL/L — LOW (ref 22–31)
CREAT SERPL-MCNC: 1.12 MG/DL — SIGNIFICANT CHANGE UP (ref 0.5–1.3)
EGFR: 50 ML/MIN/1.73M2 — LOW
GLUCOSE SERPL-MCNC: 140 MG/DL — HIGH (ref 70–99)
HCT VFR BLD CALC: 44.7 % — SIGNIFICANT CHANGE UP (ref 34.5–45)
HGB BLD-MCNC: 15.3 G/DL — SIGNIFICANT CHANGE UP (ref 11.5–15.5)
MCHC RBC-ENTMCNC: 30.4 PG — SIGNIFICANT CHANGE UP (ref 27–34)
MCHC RBC-ENTMCNC: 34.2 GM/DL — SIGNIFICANT CHANGE UP (ref 32–36)
MCV RBC AUTO: 88.9 FL — SIGNIFICANT CHANGE UP (ref 80–100)
PLATELET # BLD AUTO: 331 K/UL — SIGNIFICANT CHANGE UP (ref 150–400)
POTASSIUM SERPL-MCNC: 4 MMOL/L — SIGNIFICANT CHANGE UP (ref 3.5–5.3)
POTASSIUM SERPL-SCNC: 4 MMOL/L — SIGNIFICANT CHANGE UP (ref 3.5–5.3)
RBC # BLD: 5.03 M/UL — SIGNIFICANT CHANGE UP (ref 3.8–5.2)
RBC # FLD: 13.5 % — SIGNIFICANT CHANGE UP (ref 10.3–14.5)
SODIUM SERPL-SCNC: 139 MMOL/L — SIGNIFICANT CHANGE UP (ref 135–145)
WBC # BLD: 7.66 K/UL — SIGNIFICANT CHANGE UP (ref 3.8–10.5)
WBC # FLD AUTO: 7.66 K/UL — SIGNIFICANT CHANGE UP (ref 3.8–10.5)

## 2022-06-26 PROCEDURE — 99236 HOSP IP/OBS SAME DATE HI 85: CPT

## 2022-06-26 RX ORDER — ALBUTEROL 90 UG/1
2 AEROSOL, METERED ORAL EVERY 6 HOURS
Refills: 0 | Status: DISCONTINUED | OUTPATIENT
Start: 2022-06-26 | End: 2022-06-26

## 2022-06-26 RX ORDER — METOPROLOL TARTRATE 50 MG
25 TABLET ORAL
Refills: 0 | Status: DISCONTINUED | OUTPATIENT
Start: 2022-06-26 | End: 2022-06-26

## 2022-06-26 RX ORDER — PANTOPRAZOLE SODIUM 20 MG/1
1 TABLET, DELAYED RELEASE ORAL
Qty: 0 | Refills: 0 | DISCHARGE
Start: 2022-06-26

## 2022-06-26 RX ORDER — FLUTICASONE PROPIONATE AND SALMETEROL 50; 250 UG/1; UG/1
2 POWDER ORAL; RESPIRATORY (INHALATION)
Qty: 0 | Refills: 0 | DISCHARGE

## 2022-06-26 RX ORDER — METOPROLOL TARTRATE 50 MG
25 TABLET ORAL DAILY
Refills: 0 | Status: COMPLETED | OUTPATIENT
Start: 2022-06-26 | End: 2022-06-26

## 2022-06-26 RX ORDER — HYDROCHLOROTHIAZIDE 25 MG
25 TABLET ORAL DAILY
Refills: 0 | Status: DISCONTINUED | OUTPATIENT
Start: 2022-06-26 | End: 2022-06-26

## 2022-06-26 RX ORDER — SODIUM CHLORIDE 9 MG/ML
1000 INJECTION INTRAMUSCULAR; INTRAVENOUS; SUBCUTANEOUS
Refills: 0 | Status: COMPLETED | OUTPATIENT
Start: 2022-06-26 | End: 2022-06-26

## 2022-06-26 RX ORDER — ACETAMINOPHEN 500 MG
650 TABLET ORAL EVERY 6 HOURS
Refills: 0 | Status: DISCONTINUED | OUTPATIENT
Start: 2022-06-26 | End: 2022-06-26

## 2022-06-26 RX ORDER — ALPRAZOLAM 0.25 MG
0.5 TABLET ORAL THREE TIMES A DAY
Refills: 0 | Status: DISCONTINUED | OUTPATIENT
Start: 2022-06-26 | End: 2022-06-26

## 2022-06-26 RX ORDER — LISINOPRIL 2.5 MG/1
20 TABLET ORAL DAILY
Refills: 0 | Status: COMPLETED | OUTPATIENT
Start: 2022-06-26 | End: 2022-06-26

## 2022-06-26 RX ORDER — CHOLECALCIFEROL (VITAMIN D3) 125 MCG
2000 CAPSULE ORAL DAILY
Refills: 0 | Status: DISCONTINUED | OUTPATIENT
Start: 2022-06-26 | End: 2022-06-26

## 2022-06-26 RX ORDER — SODIUM CHLORIDE 0.65 %
1 AEROSOL, SPRAY (ML) NASAL
Refills: 0 | Status: DISCONTINUED | OUTPATIENT
Start: 2022-06-26 | End: 2022-06-26

## 2022-06-26 RX ORDER — ZOLPIDEM TARTRATE 10 MG/1
5 TABLET ORAL AT BEDTIME
Refills: 0 | Status: DISCONTINUED | OUTPATIENT
Start: 2022-06-26 | End: 2022-06-26

## 2022-06-26 RX ORDER — DILTIAZEM HCL 120 MG
60 CAPSULE, EXT RELEASE 24 HR ORAL
Refills: 0 | Status: DISCONTINUED | OUTPATIENT
Start: 2022-06-26 | End: 2022-06-26

## 2022-06-26 RX ORDER — LISINOPRIL 2.5 MG/1
20 TABLET ORAL
Refills: 0 | Status: DISCONTINUED | OUTPATIENT
Start: 2022-06-26 | End: 2022-06-26

## 2022-06-26 RX ORDER — PANTOPRAZOLE SODIUM 20 MG/1
1 TABLET, DELAYED RELEASE ORAL
Qty: 10 | Refills: 0
Start: 2022-06-26 | End: 2022-07-05

## 2022-06-26 RX ORDER — APIXABAN 2.5 MG/1
5 TABLET, FILM COATED ORAL EVERY 12 HOURS
Refills: 0 | Status: DISCONTINUED | OUTPATIENT
Start: 2022-06-26 | End: 2022-06-26

## 2022-06-26 RX ORDER — PANTOPRAZOLE SODIUM 20 MG/1
40 TABLET, DELAYED RELEASE ORAL
Refills: 0 | Status: DISCONTINUED | OUTPATIENT
Start: 2022-06-26 | End: 2022-06-26

## 2022-06-26 RX ORDER — DILTIAZEM HCL 120 MG
300 CAPSULE, EXT RELEASE 24 HR ORAL DAILY
Refills: 0 | Status: DISCONTINUED | OUTPATIENT
Start: 2022-06-26 | End: 2022-06-26

## 2022-06-26 RX ORDER — ATORVASTATIN CALCIUM 80 MG/1
40 TABLET, FILM COATED ORAL AT BEDTIME
Refills: 0 | Status: DISCONTINUED | OUTPATIENT
Start: 2022-06-26 | End: 2022-06-26

## 2022-06-26 RX ADMIN — PANTOPRAZOLE SODIUM 40 MILLIGRAM(S): 20 TABLET, DELAYED RELEASE ORAL at 08:14

## 2022-06-26 RX ADMIN — SODIUM CHLORIDE 100 MILLILITER(S): 9 INJECTION INTRAMUSCULAR; INTRAVENOUS; SUBCUTANEOUS at 03:11

## 2022-06-26 RX ADMIN — Medication 300 MILLIGRAM(S): at 08:21

## 2022-06-26 RX ADMIN — Medication 25 MILLIGRAM(S): at 08:21

## 2022-06-26 RX ADMIN — Medication 25 MILLIGRAM(S): at 01:28

## 2022-06-26 RX ADMIN — LISINOPRIL 20 MILLIGRAM(S): 2.5 TABLET ORAL at 08:21

## 2022-06-26 RX ADMIN — Medication 60 MILLIGRAM(S): at 03:12

## 2022-06-26 RX ADMIN — LISINOPRIL 20 MILLIGRAM(S): 2.5 TABLET ORAL at 01:28

## 2022-06-26 RX ADMIN — Medication 1 TABLET(S): at 08:22

## 2022-06-26 RX ADMIN — Medication 40 MILLIGRAM(S): at 08:22

## 2022-06-26 RX ADMIN — Medication 2000 UNIT(S): at 08:22

## 2022-06-26 RX ADMIN — ALBUTEROL 2 PUFF(S): 90 AEROSOL, METERED ORAL at 09:51

## 2022-06-26 NOTE — DISCHARGE NOTE PROVIDER - HOSPITAL COURSE
. .77 year old female w hx breast ca s/p L mastectomy, former smoker w  COPD on home O2 occas, HLD, HTN, PAF on eliquis ,  PPM since 2009  w new single chamber PPM Medtronic (RA lead only) implant on 8/24/2021 for SSS presented to ED by EMS c/o epistaxis    Pt states she noted bleeding from both nostrils and was unable to make it stop  Has had multiple cauteries in past; on eliquis for PAF  Has had increased cough over past few days treated w dany  denied fever  felt more labored w SOB on exertion, occas at rest    Also has had pneumonitis a few weeks ago; seen by pulmonary and placed on steroid taper which she completed more than 2 weeks ago  Will not use her nasal steroid spray at home since it has precipitated epistaxis; uses nasal saline instead  Is not consistently using O2 since the nasal cannula dries out the nasal membranes and she bleeds  In ED /75  HR 77   RR 18  T 98.2   95% RA  epistaxis resolved w/o intervention  dexamethasone 5 mg IV  albuterol neb  Mg 1 g IV  CXR clear   pt was saturating well on RA , she NRB was ordered in ED in order to avoid further nasal bleed with nasal cannula     patient was seen 6/26 at 10am pt comfortable speaking in full sentences , denies sob at rest, says she wants to go home and says that the main reason she came to ED was epistaxis and bryce breathing is at baseline , however has had more cough lastely in the past few days , her epistaxis has resolved , says will see her ENT and Pulm Dr valero    77 year old female w hx breast ca s/p L mastectomy, former smoker w  COPD on home O2 occas, HLD, HTN, PAF on eliquis ,  PPM since 2009  w new single chamber PPM Medtronic (RA lead only) implant on 8/24/2021 for SSS presented to ED by EMS c/o epistaxis    #acute mild  COPD exacerbation  #chronic hypoxic resp failure at baseline now , now on RA, pt says she has home o2 at home and uses it as needed  1. obs on med surg     s/p dexamethasone in ED  s/p . solumedrol 40 mg BID now switch to prednisone taper over 7-8 days   f/u with Dr valero in 1 week- I   informed Dr valero   Incruse ellipta NA   albuterol q 6 hrs  s/p Mg 1 g in ED    #Tachycardia  # leukocytosis likely stress related   likely a combination of volume depletion  and missed night time dose of diltiazem  resumed diltiazem  s/p  IV NS @ 100 cc/hr x 1 L    #elevated creatinine likely from volume depletion /prerenal azotemia resolved   1. s/p  cc in ED    #elevated Hb   16.2 vs 13.8 08-  #Hx chronic compensatory erythrocytosis  1. s/p  cc in ED  s/p IV  cc/hr x 1 L   trend H/H as outpt     #Epistaxis  resolved spontaneously and has not recurred   f/u with Dr Valero  f/u ENT as outpatient       #HLD   [facility]    #HTN  cw home meds    #PAF  in NSR now  1. eliquis 5 mg BID,   BB as above    #Hx L breast CA s/p mod mastectomy  #LUE edema  1. no BP L arm    medically stable for discharge home, discussed with Celeste stark and patient and RN team     Physical exam   NAD   [id number] regular   Lungs - clear to auscultation B/L   abd soft NT   ext B/L lower ext no edema, LUE edema from prior mastectomy    discharge time 47mins

## 2022-06-26 NOTE — H&P ADULT - ASSESSMENT
77 year old female w hx breast ca s/p L mastectomy, former smoker w  COPD on home O2 occas, HLD, HTN, PAF on eliquis ,  PPM since 2009  w new single chamber PPM Medtronic (RA lead only) implant on 8/24/2021 for SSS presented to ED by EMS c/o epistaxis      #Acute decomp of COPD  #Acute on chronic hypoxic resp failure  1. admit to med  2. humidify venturi mask  3. s/p dexamethasone in ED  4. solumedrol 40 mg BID  5. Incruse ellipta NA  6. albuterol q 6 hrs  7. s/p Mg 1 g in ED  8. can consider pulm if breathing does not improve      #Tachycardia  likely a combination of volume depletion given increased work of breathing and missed night time dose of diltiazem  1. diltiazem 60 mg po stat  2. IV NS @ 100 cc/hr      #Leukocytosis  1. trend      #Epistaxis  resolved spontaneously and has not recurred  1. humidify O2 here  2. her oxygenator at home can likely be humidified  3. NS q 2 hrs prn      #HLD  1. crestor interchange      #HTN  all w parameters  1. diltiazem  mg q AM  2. diltiazem 60 mg po q HS  3. metoprolol 25 mg BID  4. HCTZ 25 mg po q AM prn BP sys > 135      #PAF  in NSR now  1. eliquis 5 mg BID, hold dose overnight now  2. resume in AM  3. BB as above      #Hx L breast CA s/p mod mastectomy  #LUE edema  1. no BP L arm        #VTE  on eliquis     77 year old female w hx breast ca s/p L mastectomy, former smoker w  COPD on home O2 occas, HLD, HTN, PAF on eliquis ,  PPM since 2009  w new single chamber PPM Medtronic (RA lead only) implant on 8/24/2021 for SSS presented to ED by EMS c/o epistaxis      #Acute decomp of COPD  #Acute on chronic hypoxic resp failure  1. admit to med  2. humidify venturi mask  3. s/p dexamethasone in ED  4. solumedrol 40 mg BID  5. Incruse ellipta NA  6. albuterol q 6 hrs  7. s/p Mg 1 g in ED  8. can consider pulm if breathing does not improve      #Tachycardia  likely a combination of volume depletion given increased work of breathing and missed night time dose of diltiazem  1. diltiazem 60 mg po stat  2. IV NS @ 100 cc/hr x 1 L      #Leukocytosis  1. trend      #Epistaxis  resolved spontaneously and has not recurred  1. humidify O2 here  2. her oxygenator at home can likely be humidified  3. NS q 2 hrs prn      #HLD  1. crestor interchange      #HTN  all w parameters  1. diltiazem  mg q AM  2. diltiazem 60 mg po q HS  3. metoprolol 25 mg BID  4. HCTZ 25 mg po q AM prn BP sys > 135      #PAF  in NSR now  1. eliquis 5 mg BID, hold dose overnight now  2. resume in AM  3. BB as above      #Hx L breast CA s/p mod mastectomy  #LUE edema  1. no BP L arm        #VTE  on eliquis     77 year old female w hx breast ca s/p L mastectomy, former smoker w  COPD on home O2 occas, HLD, HTN, PAF on eliquis ,  PPM since 2009  w new single chamber PPM Medtronic (RA lead only) implant on 8/24/2021 for SSS presented to ED by EMS c/o epistaxis      #Acute decomp of COPD  #Acute on chronic hypoxic resp failure  1. obs on med surg  2. change NRB to humidified venturi mask  3. s/p dexamethasone in ED  4. solumedrol 40 mg BID  5. Incruse ellipta NA  6. albuterol q 6 hrs  7. s/p Mg 1 g in ED  8. can consider pulm if breathing does not improve      #Tachycardia  likely a combination of volume depletion given increased work of breathing and missed night time dose of diltiazem  1. diltiazem 60 mg po stat  2. IV NS @ 100 cc/hr x 1 L      #elevated creatinine  1. s/p  cc in ED  2. BMP in AM      #elevated Hb   16.2 vs 13.8 08-  #Hx chronic compensatory erythrocytosis  1. s/p  cc in ED  2. IV  cc/hr x 1 L  3. trend H/H      #Leukocytosis  1. trend      #Epistaxis  resolved spontaneously and has not recurred  1. humidify O2 here  2. her oxygenator at home can likely be humidified  3. NS q 2 hrs prn      #HLD  1. crestor interchange      #HTN  all w parameters  1. diltiazem  mg q AM  2. diltiazem 60 mg po q HS  3. metoprolol 25 mg BID  4. HCTZ 25 mg po q AM prn BP sys > 135      #PAF  in NSR now  1. eliquis 5 mg BID, hold dose overnight now  2. resume in AM  3. BB as above      #Hx L breast CA s/p mod mastectomy  #LUE edema  1. no BP L arm        #VTE  on eliquis

## 2022-06-26 NOTE — H&P ADULT - NSHPOUTPATIENTPROVIDERS_GEN_ALL_CORE
PCP Card  Dr. Tee Okeefe  Breast surgeon  Dr. Baer  ENT Dr. Verdugo PCP Card  Dr. Tee Justin  Breast surgeon  Dr. Baer  ENT Dr. Verdugo

## 2022-06-26 NOTE — DISCHARGE NOTE PROVIDER - NSDCFUSCHEDAPPT_GEN_ALL_CORE_FT
[facility]  BRSTIMAG [address]  Scheduled Appointment: 07/08/2022    [facility]  ULTRASND [address]  Scheduled Appointment: 07/08/2022    [facility]  HNT PreAdmits  Scheduled Appointment: 07/13/2022    [facility]  CardioElectro [address]  Scheduled Appointment: 07/15/2022    [facility]  OrthoSurg  [address]  Scheduled Appointment: 07/26/2022    [facility]  HNT PreAdmits  Scheduled Appointment: 07/26/2022

## 2022-06-26 NOTE — PATIENT PROFILE ADULT - FALL HARM RISK - HARM RISK INTERVENTIONS

## 2022-06-26 NOTE — H&P ADULT - HISTORY OF PRESENT ILLNESS
77 year old female w hx breast ca s/p L mastectomy, former smoker w  COPD on home O2 occas, HLD, HTN, PAF on eliquis ,  PPM since 2009  w new single chamber PPM Medtronic (RA lead only) implant on 8/24/2021 for SSS presented to ED by EMS c/o epistaxis    Pt states she noted bleeding from both nostrils and was unable to make it stop  Has had multiple cauteries in past; on eliquis for PAF  Has had increased cough over past few days treated w pastoraafenisen  denied fever  felt more labored w SOB on exertion, occas at rest    Also has had pneumonitis a few weeks ago; seen by pulmonary and placed on steroid taper which she completed more than 1 week ago  Will not use her nasal steroid spray at home since it has precipitated epistaxis; uses nasal saline instead  Is not consistently using O2 since the nasal cannula dries out the nasal membranes and she bleeds      In ED /75  HR 77   RR 18  T 98.2   95% RA  epistaxis resolved w/o intervention  dexamethasone 5 mg IV  albuterol neb  Mg 1 g IV  CXR clear      Was tachycardic to low 100s when she arrived on floor and respirations were labored.  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

## 2022-06-26 NOTE — H&P ADULT - NSHPLABSRESULTS_GEN_ALL_CORE
CXR no acute infiltrate by my review    EKG NSR 75 septal infarct age undetermined CXR no acute infiltrate by my review    EKG NSR 75 septal infarct age undetermined; unable to view image in EMR due to error message; according to report no significant change from EKG done 08-

## 2022-06-26 NOTE — DISCHARGE NOTE PROVIDER - CARE PROVIDER_API CALL
SONIA Justin (DO)  Pulmonary Disease; Sleep Medicine  [address] [place] [address]  Phone: [phone]  Fax: [phone]  Follow Up Time:     ENT,   Phone: (   )    -  Fax: (   )    -  Follow Up Time:

## 2022-06-26 NOTE — DISCHARGE NOTE NURSING/CASE MANAGEMENT/SOCIAL WORK - PATIENT PORTAL LINK FT
You can access the FollowMyHealth Patient Portal offered by [facility] by registering at the following website: [email or web address]. By joining [facility]’s FollowMyHealth portal, you will also be able to view your health information using other applications (apps) compatible with our system.

## 2022-06-26 NOTE — H&P ADULT - NSHPPHYSICALEXAM_GEN_ALL_CORE
Vital Signs Last 24 Hrs  T(C): 36.7 (26 Jun 2022 01:15), Max: 36.8 (25 Jun 2022 17:03)  T(F): 98.1 (26 Jun 2022 01:15), Max: 98.2 (25 Jun 2022 17:03)  HR: 104 (26 Jun 2022 01:15) (77 - 104)  BP: 146/64 (26 Jun 2022 01:15) (110/64 - 146/64)  BP(mean): 76 (25 Jun 2022 23:48) (76 - 76)  RR: 22 (26 Jun 2022 01:15) (18 - 24)  SpO2: 95% (26 Jun 2022 01:15) (91% - 96%)

## 2022-06-26 NOTE — DISCHARGE NOTE NURSING/CASE MANAGEMENT/SOCIAL WORK - NSDCPEFALRISK_GEN_ALL_CORE
For information on Fall & Injury Prevention, visit: [email or web address] OR  [email or web address] OR  https://www.cdc.gov/steadi/patient.html

## 2022-06-26 NOTE — DISCHARGE NOTE PROVIDER - NSDCCPCAREPLAN_GEN_ALL_CORE_FT
PRINCIPAL DISCHARGE DIAGNOSIS  Diagnosis: COPD exacerbation  Assessment and Plan of Treatment: you were started on steroid taper, please follow up with Dr valero in 1 week  follow up with ENT forrecurrent nosebleed

## 2022-06-26 NOTE — H&P ADULT - RESPIRATORY
clear to auscultation bilaterally/breath sounds equal/no intercostal retractions/respiratory distress/diminished breath sounds, L/diminished breath sounds, R

## 2022-06-26 NOTE — DISCHARGE NOTE PROVIDER - NSDCMRMEDTOKEN_GEN_ALL_CORE_FT
acetaminophen 325 mg oral tablet: 2 tab(s) orally every 6 hours, As needed, Mild Pain (1 - 3)  benazepril 20 mg oral tablet: 1 tab(s) orally 2 times a day  Cardizem 60 mg oral tablet: 1 tab(s) orally once a day (at bedtime)  cholecalciferol 1000 intl units oral tablet: 2 tab(s) orally once a day  CoQ10: 100 milligram(s) orally once a day  Crestor 10 mg oral tablet: 1 tab(s) orally once a day  DilTIAZem (Eqv-Cardizem CD) 300 mg/24 hours oral capsule, extended release: 1 cap(s) orally once a day  Eliquis 5 mg oral tablet: 1 tab(s) orally 2 times a day, DO NOT RESTART TAKING UNTIL Saturday 8/28.        hydroCHLOROthiazide 25 mg oral tablet: 1 tab(s) orally once a day, As Needed ( BP &gt;135 )   Incruse Ellipta 62.5 mcg/inh inhalation powder: 1 puff(s) inhaled every 24 hours  metoprolol succinate 25 mg oral tablet, extended release: 1 tab(s) orally 2 times a day  Multiple Vitamins oral tablet: 1 tab(s) orally once a day  pantoprazole 40 mg oral delayed release tablet: 1 tab(s) orally once a day (before a meal)  predniSONE 10 mg oral tablet: 1 tab(s) orally once a day  ProAir HFA 90 mcg/inh inhalation aerosol: 2 puff(s) inhaled every 6 hours, As Needed - for shortness of breath and/or wheezing  Xanax 0.5 mg oral tablet: 1 tab(s) orally 3 times a day, As Needed  zolpidem 10 mg oral tablet: 1 tab(s) orally once a day (at bedtime)   acetaminophen 325 mg oral tablet: 2 tab(s) orally every 6 hours, As needed, Mild Pain (1 - 3)  benazepril 20 mg oral tablet: 1 tab(s) orally 2 times a day  Cardizem 60 mg oral tablet: 1 tab(s) orally once a day (at bedtime)  cholecalciferol 1000 intl units oral tablet: 2 tab(s) orally once a day  CoQ10: 100 milligram(s) orally once a day  Crestor 10 mg oral tablet: 1 tab(s) orally once a day  DilTIAZem (Eqv-Cardizem CD) 300 mg/24 hours oral capsule, extended release: 1 cap(s) orally once a day  Eliquis 5 mg oral tablet: 1 tab(s) orally 2 times a day, DO NOT RESTART TAKING UNTIL Saturday 8/28.        hydroCHLOROthiazide 25 mg oral tablet: 1 tab(s) orally once a day, As Needed ( BP &gt;135 )   Incruse Ellipta 62.5 mcg/inh inhalation powder: 1 puff(s) inhaled every 24 hours  metoprolol succinate 25 mg oral tablet, extended release: 1 tab(s) orally 2 times a day  Multiple Vitamins oral tablet: 1 tab(s) orally once a day  pantoprazole 40 mg oral delayed release tablet: 1 tab(s) orally once a day (before a meal)  predniSONE 10 mg oral tablet: 4 tab(s) orally once a day x1 day  3 tabs oral once a day x2 days  2 tabs oral once a day x2 days  1 tab oral once a day x3 days  ProAir HFA 90 mcg/inh inhalation aerosol: 2 puff(s) inhaled every 6 hours, As Needed - for shortness of breath and/or wheezing  Xanax 0.5 mg oral tablet: 1 tab(s) orally 3 times a day, As Needed  zolpidem 10 mg oral tablet: 1 tab(s) orally once a day (at bedtime)

## 2022-06-29 ENCOUNTER — NON-APPOINTMENT (OUTPATIENT)
Age: 77
End: 2022-06-29

## 2022-07-08 ENCOUNTER — RESULT REVIEW (OUTPATIENT)
Age: 77
End: 2022-07-08

## 2022-07-08 ENCOUNTER — APPOINTMENT (OUTPATIENT)
Dept: ULTRASOUND IMAGING | Facility: CLINIC | Age: 77
End: 2022-07-08

## 2022-07-08 ENCOUNTER — APPOINTMENT (OUTPATIENT)
Dept: MAMMOGRAPHY | Facility: CLINIC | Age: 77
End: 2022-07-08

## 2022-07-08 ENCOUNTER — OUTPATIENT (OUTPATIENT)
Dept: OUTPATIENT SERVICES | Facility: HOSPITAL | Age: 77
LOS: 1 days | End: 2022-07-08
Payer: MEDICARE

## 2022-07-08 DIAGNOSIS — Z90.49 ACQUIRED ABSENCE OF OTHER SPECIFIED PARTS OF DIGESTIVE TRACT: Chronic | ICD-10-CM

## 2022-07-08 DIAGNOSIS — Z12.39 ENCOUNTER FOR OTHER SCREENING FOR MALIGNANT NEOPLASM OF BREAST: ICD-10-CM

## 2022-07-08 DIAGNOSIS — R92.2 INCONCLUSIVE MAMMOGRAM: ICD-10-CM

## 2022-07-08 DIAGNOSIS — Z98.890 OTHER SPECIFIED POSTPROCEDURAL STATES: Chronic | ICD-10-CM

## 2022-07-08 DIAGNOSIS — Z95.0 PRESENCE OF CARDIAC PACEMAKER: Chronic | ICD-10-CM

## 2022-07-08 DIAGNOSIS — Z90.12 ACQUIRED ABSENCE OF LEFT BREAST AND NIPPLE: Chronic | ICD-10-CM

## 2022-07-08 DIAGNOSIS — Z85.3 PERSONAL HISTORY OF MALIGNANT NEOPLASM OF BREAST: ICD-10-CM

## 2022-07-08 PROCEDURE — 77063 BREAST TOMOSYNTHESIS BI: CPT | Mod: 26,52

## 2022-07-08 PROCEDURE — 76641 ULTRASOUND BREAST COMPLETE: CPT

## 2022-07-08 PROCEDURE — 76641 ULTRASOUND BREAST COMPLETE: CPT | Mod: 26,RT

## 2022-07-08 PROCEDURE — 77067 SCR MAMMO BI INCL CAD: CPT | Mod: 26,RT,52

## 2022-07-08 PROCEDURE — 77063 BREAST TOMOSYNTHESIS BI: CPT

## 2022-07-08 PROCEDURE — 77067 SCR MAMMO BI INCL CAD: CPT

## 2022-07-13 ENCOUNTER — RESULT REVIEW (OUTPATIENT)
Age: 77
End: 2022-07-13

## 2022-07-13 ENCOUNTER — OUTPATIENT (OUTPATIENT)
Dept: OUTPATIENT SERVICES | Facility: HOSPITAL | Age: 77
LOS: 1 days | End: 2022-07-13
Payer: MEDICARE

## 2022-07-13 VITALS
TEMPERATURE: 98 F | WEIGHT: 160.94 LBS | DIASTOLIC BLOOD PRESSURE: 61 MMHG | RESPIRATION RATE: 16 BRPM | HEART RATE: 66 BPM | OXYGEN SATURATION: 95 % | SYSTOLIC BLOOD PRESSURE: 122 MMHG | HEIGHT: 63 IN

## 2022-07-13 DIAGNOSIS — Z90.89 ACQUIRED ABSENCE OF OTHER ORGANS: Chronic | ICD-10-CM

## 2022-07-13 DIAGNOSIS — Z90.49 ACQUIRED ABSENCE OF OTHER SPECIFIED PARTS OF DIGESTIVE TRACT: Chronic | ICD-10-CM

## 2022-07-13 DIAGNOSIS — Z90.12 ACQUIRED ABSENCE OF LEFT BREAST AND NIPPLE: Chronic | ICD-10-CM

## 2022-07-13 DIAGNOSIS — Z98.890 OTHER SPECIFIED POSTPROCEDURAL STATES: Chronic | ICD-10-CM

## 2022-07-13 DIAGNOSIS — M17.12 UNILATERAL PRIMARY OSTEOARTHRITIS, LEFT KNEE: ICD-10-CM

## 2022-07-13 DIAGNOSIS — Z95.0 PRESENCE OF CARDIAC PACEMAKER: Chronic | ICD-10-CM

## 2022-07-13 DIAGNOSIS — T82.110A BREAKDOWN (MECHANICAL) OF CARDIAC ELECTRODE, INITIAL ENCOUNTER: Chronic | ICD-10-CM

## 2022-07-13 DIAGNOSIS — Z01.818 ENCOUNTER FOR OTHER PREPROCEDURAL EXAMINATION: ICD-10-CM

## 2022-07-13 LAB
A1C WITH ESTIMATED AVERAGE GLUCOSE RESULT: 6.2 % — HIGH (ref 4–5.6)
ALBUMIN SERPL ELPH-MCNC: 3.8 G/DL — SIGNIFICANT CHANGE UP (ref 3.3–5)
ANION GAP SERPL CALC-SCNC: 5 MMOL/L — SIGNIFICANT CHANGE UP (ref 5–17)
BASOPHILS # BLD AUTO: 0.16 K/UL — SIGNIFICANT CHANGE UP (ref 0–0.2)
BASOPHILS NFR BLD AUTO: 1.7 % — SIGNIFICANT CHANGE UP (ref 0–2)
BUN SERPL-MCNC: 24 MG/DL — HIGH (ref 7–23)
CALCIUM SERPL-MCNC: 9.6 MG/DL — SIGNIFICANT CHANGE UP (ref 8.5–10.1)
CHLORIDE SERPL-SCNC: 108 MMOL/L — SIGNIFICANT CHANGE UP (ref 96–108)
CO2 SERPL-SCNC: 25 MMOL/L — SIGNIFICANT CHANGE UP (ref 22–31)
CREAT SERPL-MCNC: 1.21 MG/DL — SIGNIFICANT CHANGE UP (ref 0.5–1.3)
EGFR: 46 ML/MIN/1.73M2 — LOW
EOSINOPHIL # BLD AUTO: 0.24 K/UL — SIGNIFICANT CHANGE UP (ref 0–0.5)
EOSINOPHIL NFR BLD AUTO: 2.6 % — SIGNIFICANT CHANGE UP (ref 0–6)
ESTIMATED AVERAGE GLUCOSE: 131 MG/DL — HIGH (ref 68–114)
GLUCOSE SERPL-MCNC: 115 MG/DL — HIGH (ref 70–99)
HCT VFR BLD CALC: 46 % — HIGH (ref 34.5–45)
HGB BLD-MCNC: 15.6 G/DL — HIGH (ref 11.5–15.5)
IMM GRANULOCYTES NFR BLD AUTO: 0.5 % — SIGNIFICANT CHANGE UP (ref 0–1.5)
INR BLD: 1.27 RATIO — HIGH (ref 0.88–1.16)
LYMPHOCYTES # BLD AUTO: 1.41 K/UL — SIGNIFICANT CHANGE UP (ref 1–3.3)
LYMPHOCYTES # BLD AUTO: 15.4 % — SIGNIFICANT CHANGE UP (ref 13–44)
MCHC RBC-ENTMCNC: 30.8 PG — SIGNIFICANT CHANGE UP (ref 27–34)
MCHC RBC-ENTMCNC: 33.9 GM/DL — SIGNIFICANT CHANGE UP (ref 32–36)
MCV RBC AUTO: 90.9 FL — SIGNIFICANT CHANGE UP (ref 80–100)
MONOCYTES # BLD AUTO: 1.22 K/UL — HIGH (ref 0–0.9)
MONOCYTES NFR BLD AUTO: 13.3 % — SIGNIFICANT CHANGE UP (ref 2–14)
MRSA PCR RESULT.: SIGNIFICANT CHANGE UP
NEUTROPHILS # BLD AUTO: 6.1 K/UL — SIGNIFICANT CHANGE UP (ref 1.8–7.4)
NEUTROPHILS NFR BLD AUTO: 66.5 % — SIGNIFICANT CHANGE UP (ref 43–77)
PLATELET # BLD AUTO: 223 K/UL — SIGNIFICANT CHANGE UP (ref 150–400)
POTASSIUM SERPL-MCNC: 4.5 MMOL/L — SIGNIFICANT CHANGE UP (ref 3.5–5.3)
POTASSIUM SERPL-SCNC: 4.5 MMOL/L — SIGNIFICANT CHANGE UP (ref 3.5–5.3)
PROTHROM AB SERPL-ACNC: 14.8 SEC — HIGH (ref 10.5–13.4)
RBC # BLD: 5.06 M/UL — SIGNIFICANT CHANGE UP (ref 3.8–5.2)
RBC # FLD: 14 % — SIGNIFICANT CHANGE UP (ref 10.3–14.5)
S AUREUS DNA NOSE QL NAA+PROBE: SIGNIFICANT CHANGE UP
SODIUM SERPL-SCNC: 138 MMOL/L — SIGNIFICANT CHANGE UP (ref 135–145)
WBC # BLD: 9.18 K/UL — SIGNIFICANT CHANGE UP (ref 3.8–10.5)
WBC # FLD AUTO: 9.18 K/UL — SIGNIFICANT CHANGE UP (ref 3.8–10.5)

## 2022-07-13 PROCEDURE — 87641 MR-STAPH DNA AMP PROBE: CPT

## 2022-07-13 PROCEDURE — 82040 ASSAY OF SERUM ALBUMIN: CPT

## 2022-07-13 PROCEDURE — 73560 X-RAY EXAM OF KNEE 1 OR 2: CPT | Mod: 26,LT

## 2022-07-13 PROCEDURE — 85610 PROTHROMBIN TIME: CPT

## 2022-07-13 PROCEDURE — 99214 OFFICE O/P EST MOD 30 MIN: CPT | Mod: 25

## 2022-07-13 PROCEDURE — 80048 BASIC METABOLIC PNL TOTAL CA: CPT

## 2022-07-13 PROCEDURE — 36415 COLL VENOUS BLD VENIPUNCTURE: CPT

## 2022-07-13 PROCEDURE — 86850 RBC ANTIBODY SCREEN: CPT

## 2022-07-13 PROCEDURE — 83036 HEMOGLOBIN GLYCOSYLATED A1C: CPT

## 2022-07-13 PROCEDURE — 86901 BLOOD TYPING SEROLOGIC RH(D): CPT

## 2022-07-13 PROCEDURE — 87640 STAPH A DNA AMP PROBE: CPT

## 2022-07-13 PROCEDURE — 86900 BLOOD TYPING SEROLOGIC ABO: CPT

## 2022-07-13 PROCEDURE — 73560 X-RAY EXAM OF KNEE 1 OR 2: CPT | Mod: LT

## 2022-07-13 PROCEDURE — 85025 COMPLETE CBC W/AUTO DIFF WBC: CPT

## 2022-07-13 RX ORDER — DILTIAZEM HCL 120 MG
1 CAPSULE, EXT RELEASE 24 HR ORAL
Qty: 0 | Refills: 0 | DISCHARGE

## 2022-07-13 RX ORDER — METOPROLOL TARTRATE 50 MG
1 TABLET ORAL
Qty: 0 | Refills: 0 | DISCHARGE

## 2022-07-13 NOTE — H&P PST ADULT - ASSESSMENT
77 year old female diagnosed with OA left knee c/o left knee pain; presents to PST for planned left TKR    Plan:  1. PST instructions given ; NPO status/  instructions to be given by ASU   2. Pt instructed to take following meds on day of surgery:   3. Pt instructed to take routine evening medications unless indicated   4. Stop NSAIDS ( Aspirin Alev Motrin Mobic Diclofenac), herbal supplements , MVI , Vitamin fish oil 7 days prior to surgery  unless   directed by surgeon or cardiologist;   5. Medical Optimization  with    6. EZ wash instructions given & mupirocin instructions given  7. Labs EKG CXR as per surgeon request   8. Pt instructed to self quarantine after Covid test   9. Covid Testing scheduled Pt notified and aware  10. Pt denies covid symptoms shortness of breath fever cough       CAPRINI SCORE [CLOT]    AGE RELATED RISK FACTORS                                                       MOBILITY RELATED FACTORS  [ ] Age 41-60 years                                            (1 Point)                  [ ] Bed rest                                                        (1 Point)  [ ] Age: 61-74 years                                           (2 Points)                 [ ] Plaster cast                                                   (2 Points)  [ ] Age= 75 years                                              (3 Points)                 [ ] Bed bound for more than 72 hours                 (2 Points)    DISEASE RELATED RISK FACTORS                                               GENDER SPECIFIC FACTORS  [ ] Edema in the lower extremities                       (1 Point)                  [ ] Pregnancy                                                     (1 Point)  [ ] Varicose veins                                               (1 Point)                  [ ] Post-partum < 6 weeks                                   (1 Point)             [ ] BMI > 25 Kg/m2                                            (1 Point)                  [ ] Hormonal therapy  or oral contraception          (1 Point)                 [ ] Sepsis (in the previous month)                        (1 Point)                  [ ] History of pregnancy complications                 (1 point)  [ ] Pneumonia or serious lung disease                                               [ ] Unexplained or recurrent                     (1 Point)           (in the previous month)                               (1 Point)  [ ] Abnormal pulmonary function test                     (1 Point)                 SURGERY RELATED RISK FACTORS  [ ] Acute myocardial infarction                              (1 Point)                 [ ]  Section                                             (1 Point)  [ ] Congestive heart failure (in the previous month)  (1 Point)               [ ] Minor surgery                                                  (1 Point)   [ ] Inflammatory bowel disease                             (1 Point)                 [ ] Arthroscopic surgery                                        (2 Points)  [ ] Central venous access                                      (2 Points)                [ ] General surgery lasting more than 45 minutes   (2 Points)       [ ] Stroke (in the previous month)                          (5 Points)               [ ] Elective arthroplasty                                         (5 Points)            ( ) past and present malignancy                             ( 2 points)                                                                                                                                    HEMATOLOGY RELATED FACTORS                                                 TRAUMA RELATED RISK FACTORS  [ ] Prior episodes of VTE                                     (3 Points)                 [ ] Fracture of the hip, pelvis, or leg                       (5 Points)  [ ] Positive family history for VTE                         (3 Points)                 [ ] Acute spinal cord injury (in the previous month)  (5 Points)  [ ] Prothrombin 33899 A                                     (3 Points)                 [ ] Paralysis  (less than 1 month)                             (5 Points)  [ ] Factor V Leiden                                             (3 Points)                  [ ] Multiple Trauma within 1 month                        (5 Points)  [ ] Lupus anticoagulants                                     (3 Points)                                                           [ ] Anticardiolipin antibodies                               (3 Points)                                                       [ ] High homocysteine in the blood                      (3 Points)                                             [ ] Other congenital or acquired thrombophilia      (3 Points)                                                [ ] Heparin induced thrombocytopenia                  (3 Points)                                          Total Score [          ]      The Caprini score indicates that this patient is at high risk for a VTE event (score 6 or greater). Surgical patients in this group will benefit from both pharmacologic prophylaxis and intermittent compression devices.  The surgical team will determine the balance between VTE risk and bleeding risk, and other clinical considerations  77 year old female diagnosed with OA left knee c/o left knee pain; presents to PST for planned left TKR    Plan:  1. PST instructions given ; NPO status/  instructions to be given by ASU   2. Pt instructed to take following meds on day of surgery: cardizem benazepri toprol incruse advair proair   3. Pt instructed to take routine evening medications unless indicated   4. Stop NSAIDS ( Aspirin Alev Motrin Mobic Diclofenac), herbal supplements , MVI , Vitamin fish oil 7 days prior to surgery  unless   directed by surgeon or cardiologist;   5. Medical Optimization  with Dr Okeefe   6. EZ wash instructions given & mupirocin instructions given  7. Labs EKG CXR as per surgeon request   8. Pt instructed to self quarantine after Covid test   9. Covid Testing scheduled Pt notified and aware  10. Pt denies covid symptoms shortness of breath fever cough       CAPRINI SCORE [CLOT]    AGE RELATED RISK FACTORS                                                       MOBILITY RELATED FACTORS  [ ] Age 41-60 years                                            (1 Point)                  [ ] Bed rest                                                        (1 Point)  [ ] Age: 61-74 years                                           (2 Points)                 [ ] Plaster cast                                                   (2 Points)  [ x] Age= 75 years                                              (3 Points)                 [ ] Bed bound for more than 72 hours                 (2 Points)    DISEASE RELATED RISK FACTORS                                               GENDER SPECIFIC FACTORS  [ ] Edema in the lower extremities                       (1 Point)                  [ ] Pregnancy                                                     (1 Point)  [ ] Varicose veins                                               (1 Point)                  [ ] Post-partum < 6 weeks                                   (1 Point)             [x ] BMI > 25 Kg/m2                                            (1 Point)                  [ ] Hormonal therapy  or oral contraception          (1 Point)                 [ ] Sepsis (in the previous month)                        (1 Point)                  [ ] History of pregnancy complications                 (1 point)  [ ] Pneumonia or serious lung disease                                               [ ] Unexplained or recurrent                     (1 Point)           (in the previous month)                               (1 Point)  [ ] Abnormal pulmonary function test                     (1 Point)                 SURGERY RELATED RISK FACTORS  [ ] Acute myocardial infarction                              (1 Point)                 [ ]  Section                                             (1 Point)  [ ] Congestive heart failure (in the previous month)  (1 Point)               [ ] Minor surgery                                                  (1 Point)   [ ] Inflammatory bowel disease                             (1 Point)                 [ ] Arthroscopic surgery                                        (2 Points)  [ ] Central venous access                                      (2 Points)                [ ] General surgery lasting more than 45 minutes   (2 Points)       [ ] Stroke (in the previous month)                          (5 Points)               [ x] Elective arthroplasty                                         (5 Points)            ( ) past and present malignancy                             ( 2 points)                                                                                                                                    HEMATOLOGY RELATED FACTORS                                                 TRAUMA RELATED RISK FACTORS  [ ] Prior episodes of VTE                                     (3 Points)                 [ ] Fracture of the hip, pelvis, or leg                       (5 Points)  [ ] Positive family history for VTE                         (3 Points)                 [ ] Acute spinal cord injury (in the previous month)  (5 Points)  [ ] Prothrombin 85140 A                                     (3 Points)                 [ ] Paralysis  (less than 1 month)                             (5 Points)  [ ] Factor V Leiden                                             (3 Points)                  [ ] Multiple Trauma within 1 month                        (5 Points)  [ ] Lupus anticoagulants                                     (3 Points)                                                           [ ] Anticardiolipin antibodies                               (3 Points)                                                       [ ] High homocysteine in the blood                      (3 Points)                                             [ ] Other congenital or acquired thrombophilia      (3 Points)                                                [ ] Heparin induced thrombocytopenia                  (3 Points)                                          Total Score [  9        ]      The Caprini score indicates that this patient is at high risk for a VTE event (score 6 or greater). Surgical patients in this group will benefit from both pharmacologic prophylaxis and intermittent compression devices.  The surgical team will determine the balance between VTE risk and bleeding risk, and other clinical considerations

## 2022-07-13 NOTE — H&P PST ADULT - HISTORY OF PRESENT ILLNESS
76 yr old female with h/o breast ca 1995, former smoker, stable COPD/emphysema, HTN. HLD, paroxysmal afib on Eliquis, tachy-maryam syndrome, PPM ( 2009) generator change 2020. Patient reports pacemaker leads malfunction and presents to PST for scheduled pacemaker lead extraction /pacemaker implant on 8/24/2021.  Denies CP, palpitations, chronic SOB, does not require oxygen. Denies fever, chills, no acute complaints. Denies sick contacts. Covid PCR 8/21/2021 @ [facility]. Ambulating without assistance.        ***Patient was instructed by cardiologist to hold Eliquis 3 days prior procedure *** 77 year old female diagnosed with OA left knee c/o left knee pain; presents to PST for planned left TKR

## 2022-07-13 NOTE — H&P PST ADULT - HAVE YOU HAD A FIRST COVID-19 BOOSTER?
Please call patient to inform (will need ) to inform that MRI of the lumbar spine noted lumbar arthritis/lumbar spinal stenosis/lumbar disc bulges.  Options for treatment include initiation of Gabapentin, further formal physical therapy, a right L5-S1 transforaminal epidural corticosteroid injection, etc moving forward.    Estrada Hopkins DO, [facility]  [facility]   Yes

## 2022-07-13 NOTE — H&P PST ADULT - NSICDXPASTMEDICALHX_GEN_ALL_CORE_FT
PAST MEDICAL HISTORY:  Afib on Eliquis    Borderline type 2 diabetes mellitus     COPD with emphysema     Erythrocytosis Chronic, compensatory - mild    Former smoker     History of ETOH abuse     Hyperlipidemia, unspecified hyperlipidemia type     Hypertension     Left knee pain     Malignant neoplasm of left female breast, unspecified estrogen receptor status, unspecified site of breast 1995 , h/o chemo    Osteoarthritis both knees L>R    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    Epistaxis 6/2022    Erythrocytosis Chronic, compensatory - mild    Fatty liver     Former smoker     History of ETOH abuse last drink 1984    Hyperlipidemia, unspecified hyperlipidemia type     Hypertension     Left knee pain     Malignant neoplasm of left female breast, unspecified estrogen receptor status, unspecified site of breast 1995 , h/o chemo    Melanoma back    On home oxygen therapy 2L as needed    Osteoarthritis both knees L>R    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

## 2022-07-13 NOTE — H&P PST ADULT - NSICDXPASTSURGICALHX_GEN_ALL_CORE_FT
PAST SURGICAL HISTORY:  Artificial pacemaker Medtronic Model [id number]  initially 7/28/2009,  generator change 9/30/2020 , last interrogation 8/9/2021    H/O mastectomy, left 1995    History of appendectomy 2018    History of arthroscopy of left knee      PAST SURGICAL HISTORY:  Artificial pacemaker Medtronic Model [id number]  initially 7/28/2009,  generator change 9/30/2020 ,    H/O mastectomy, left 1995 axillary node dissection    History of appendectomy 2018    History of arthroscopy of left knee 2007    History of tonsillectomy     Pacemaker lead fracture replace atrial only 8/2021

## 2022-07-13 NOTE — H&P PST ADULT - FALL HARM RISK - UNIVERSAL INTERVENTIONS
Bed in lowest position, wheels locked, appropriate side rails in place/Call bell, personal items and telephone in reach/Instruct patient to call for assistance before getting out of bed or chair/Non-slip footwear when patient is out of bed/[place] to call system/Physically safe environment - no spills, clutter or unnecessary equipment/Purposeful Proactive Rounding/Room/bathroom lighting operational, light cord in reach

## 2022-07-13 NOTE — H&P PST ADULT - HEALTH CARE MAINTENANCE
Moderna fully vaccinated , as per patient Pt denies travel out of [facility] for the past 14 days Pt denies  travel internationally for the past 21 days   annual physical

## 2022-07-14 DIAGNOSIS — Z01.818 ENCOUNTER FOR OTHER PREPROCEDURAL EXAMINATION: ICD-10-CM

## 2022-07-14 DIAGNOSIS — M17.12 UNILATERAL PRIMARY OSTEOARTHRITIS, LEFT KNEE: ICD-10-CM

## 2022-07-15 ENCOUNTER — APPOINTMENT (OUTPATIENT)
Dept: ELECTROPHYSIOLOGY | Facility: CLINIC | Age: 77
End: 2022-07-15

## 2022-07-15 ENCOUNTER — NON-APPOINTMENT (OUTPATIENT)
Age: 77
End: 2022-07-15

## 2022-07-15 PROBLEM — F41.9 ANXIETY DISORDER, UNSPECIFIED: Chronic | Status: ACTIVE | Noted: 2022-07-13

## 2022-07-15 PROBLEM — R04.0 EPISTAXIS: Chronic | Status: ACTIVE | Noted: 2022-07-13

## 2022-07-15 PROBLEM — K76.0 FATTY (CHANGE OF) LIVER, NOT ELSEWHERE CLASSIFIED: Chronic | Status: ACTIVE | Noted: 2022-07-13

## 2022-07-15 PROBLEM — J43.9 EMPHYSEMA, UNSPECIFIED: Chronic | Status: ACTIVE | Noted: 2021-08-19

## 2022-07-15 PROBLEM — E04.1 NONTOXIC SINGLE THYROID NODULE: Chronic | Status: ACTIVE | Noted: 2022-07-13

## 2022-07-15 PROBLEM — F10.11 ALCOHOL ABUSE, IN REMISSION: Chronic | Status: ACTIVE | Noted: 2021-08-19

## 2022-07-15 PROBLEM — C43.9 MALIGNANT MELANOMA OF SKIN, UNSPECIFIED: Chronic | Status: ACTIVE | Noted: 2022-07-13

## 2022-07-15 PROBLEM — J18.9 PNEUMONIA, UNSPECIFIED ORGANISM: Chronic | Status: ACTIVE | Noted: 2022-07-13

## 2022-07-15 PROBLEM — Z99.81 DEPENDENCE ON SUPPLEMENTAL OXYGEN: Chronic | Status: ACTIVE | Noted: 2022-07-13

## 2022-07-15 PROCEDURE — 93296 REM INTERROG EVL PM/IDS: CPT

## 2022-07-15 PROCEDURE — 93294 REM INTERROG EVL PM/LDLS PM: CPT

## 2022-07-26 ENCOUNTER — OUTPATIENT (OUTPATIENT)
Dept: OUTPATIENT SERVICES | Facility: HOSPITAL | Age: 77
LOS: 1 days | End: 2022-07-26

## 2022-07-26 ENCOUNTER — APPOINTMENT (OUTPATIENT)
Dept: CARDIOLOGY | Facility: CLINIC | Age: 77
End: 2022-07-26

## 2022-07-26 ENCOUNTER — TRANSCRIPTION ENCOUNTER (OUTPATIENT)
Age: 77
End: 2022-07-26

## 2022-07-26 DIAGNOSIS — Z90.12 ACQUIRED ABSENCE OF LEFT BREAST AND NIPPLE: Chronic | ICD-10-CM

## 2022-07-26 DIAGNOSIS — Z98.890 OTHER SPECIFIED POSTPROCEDURAL STATES: Chronic | ICD-10-CM

## 2022-07-26 DIAGNOSIS — Z95.0 PRESENCE OF CARDIAC PACEMAKER: Chronic | ICD-10-CM

## 2022-07-26 DIAGNOSIS — Z90.89 ACQUIRED ABSENCE OF OTHER ORGANS: Chronic | ICD-10-CM

## 2022-07-26 DIAGNOSIS — M17.12 UNILATERAL PRIMARY OSTEOARTHRITIS, LEFT KNEE: ICD-10-CM

## 2022-07-26 DIAGNOSIS — T82.110A BREAKDOWN (MECHANICAL) OF CARDIAC ELECTRODE, INITIAL ENCOUNTER: Chronic | ICD-10-CM

## 2022-07-26 DIAGNOSIS — Z90.49 ACQUIRED ABSENCE OF OTHER SPECIFIED PARTS OF DIGESTIVE TRACT: Chronic | ICD-10-CM

## 2022-07-26 RX ORDER — ALPRAZOLAM 0.25 MG
1 TABLET ORAL
Qty: 0 | Refills: 0 | DISCHARGE

## 2022-07-26 RX ORDER — FLUTICASONE PROPIONATE AND SALMETEROL 50; 250 UG/1; UG/1
1 POWDER ORAL; RESPIRATORY (INHALATION)
Qty: 0 | Refills: 0 | DISCHARGE

## 2022-07-26 RX ORDER — ZOLPIDEM TARTRATE 10 MG/1
1 TABLET ORAL
Qty: 0 | Refills: 0 | DISCHARGE

## 2022-07-28 RX ORDER — ONDANSETRON 8 MG/1
4 TABLET, FILM COATED ORAL ONCE
Refills: 0 | Status: DISCONTINUED | OUTPATIENT
Start: 2022-07-29 | End: 2022-07-29

## 2022-07-28 RX ORDER — SODIUM CHLORIDE 9 MG/ML
1000 INJECTION, SOLUTION INTRAVENOUS
Refills: 0 | Status: DISCONTINUED | OUTPATIENT
Start: 2022-07-29 | End: 2022-07-29

## 2022-07-28 RX ORDER — FENTANYL CITRATE 50 UG/ML
50 INJECTION INTRAVENOUS
Refills: 0 | Status: DISCONTINUED | OUTPATIENT
Start: 2022-07-29 | End: 2022-07-29

## 2022-07-28 RX ORDER — OXYCODONE HYDROCHLORIDE 5 MG/1
5 TABLET ORAL ONCE
Refills: 0 | Status: DISCONTINUED | OUTPATIENT
Start: 2022-07-29 | End: 2022-07-29

## 2022-07-29 ENCOUNTER — APPOINTMENT (OUTPATIENT)
Dept: ORTHOPEDIC SURGERY | Facility: HOSPITAL | Age: 77
End: 2022-07-29

## 2022-07-29 ENCOUNTER — TRANSCRIPTION ENCOUNTER (OUTPATIENT)
Age: 77
End: 2022-07-29

## 2022-07-29 ENCOUNTER — RESULT REVIEW (OUTPATIENT)
Age: 77
End: 2022-07-29

## 2022-07-29 ENCOUNTER — INPATIENT (INPATIENT)
Facility: HOSPITAL | Age: 77
LOS: 2 days | Discharge: HOME HEALTH | DRG: 469 | End: 2022-08-01
Attending: ORTHOPAEDIC SURGERY | Admitting: ORTHOPAEDIC SURGERY
Payer: MEDICARE

## 2022-07-29 VITALS
HEIGHT: 63 IN | HEART RATE: 73 BPM | RESPIRATION RATE: 20 BRPM | SYSTOLIC BLOOD PRESSURE: 141 MMHG | DIASTOLIC BLOOD PRESSURE: 71 MMHG | TEMPERATURE: 98 F | WEIGHT: 160.06 LBS | OXYGEN SATURATION: 95 %

## 2022-07-29 DIAGNOSIS — M17.12 UNILATERAL PRIMARY OSTEOARTHRITIS, LEFT KNEE: ICD-10-CM

## 2022-07-29 DIAGNOSIS — Z90.89 ACQUIRED ABSENCE OF OTHER ORGANS: Chronic | ICD-10-CM

## 2022-07-29 DIAGNOSIS — T82.110A BREAKDOWN (MECHANICAL) OF CARDIAC ELECTRODE, INITIAL ENCOUNTER: Chronic | ICD-10-CM

## 2022-07-29 DIAGNOSIS — Z90.12 ACQUIRED ABSENCE OF LEFT BREAST AND NIPPLE: Chronic | ICD-10-CM

## 2022-07-29 DIAGNOSIS — Z90.49 ACQUIRED ABSENCE OF OTHER SPECIFIED PARTS OF DIGESTIVE TRACT: Chronic | ICD-10-CM

## 2022-07-29 DIAGNOSIS — Z98.890 OTHER SPECIFIED POSTPROCEDURAL STATES: Chronic | ICD-10-CM

## 2022-07-29 DIAGNOSIS — Z95.0 PRESENCE OF CARDIAC PACEMAKER: Chronic | ICD-10-CM

## 2022-07-29 LAB
ANION GAP SERPL CALC-SCNC: 6 MMOL/L — SIGNIFICANT CHANGE UP (ref 5–17)
BUN SERPL-MCNC: 23 MG/DL — SIGNIFICANT CHANGE UP (ref 7–23)
CALCIUM SERPL-MCNC: 9.1 MG/DL — SIGNIFICANT CHANGE UP (ref 8.5–10.1)
CHLORIDE SERPL-SCNC: 110 MMOL/L — HIGH (ref 96–108)
CO2 SERPL-SCNC: 25 MMOL/L — SIGNIFICANT CHANGE UP (ref 22–31)
CREAT SERPL-MCNC: 1.33 MG/DL — HIGH (ref 0.5–1.3)
EGFR: 41 ML/MIN/1.73M2 — LOW
GLUCOSE SERPL-MCNC: 106 MG/DL — HIGH (ref 70–99)
HCT VFR BLD CALC: 41 % — SIGNIFICANT CHANGE UP (ref 34.5–45)
HGB BLD-MCNC: 13.7 G/DL — SIGNIFICANT CHANGE UP (ref 11.5–15.5)
MCHC RBC-ENTMCNC: 30.9 PG — SIGNIFICANT CHANGE UP (ref 27–34)
MCHC RBC-ENTMCNC: 33.4 GM/DL — SIGNIFICANT CHANGE UP (ref 32–36)
MCV RBC AUTO: 92.3 FL — SIGNIFICANT CHANGE UP (ref 80–100)
PLATELET # BLD AUTO: 291 K/UL — SIGNIFICANT CHANGE UP (ref 150–400)
POTASSIUM SERPL-MCNC: 3.7 MMOL/L — SIGNIFICANT CHANGE UP (ref 3.5–5.3)
POTASSIUM SERPL-SCNC: 3.7 MMOL/L — SIGNIFICANT CHANGE UP (ref 3.5–5.3)
RBC # BLD: 4.44 M/UL — SIGNIFICANT CHANGE UP (ref 3.8–5.2)
RBC # FLD: 13.8 % — SIGNIFICANT CHANGE UP (ref 10.3–14.5)
SODIUM SERPL-SCNC: 141 MMOL/L — SIGNIFICANT CHANGE UP (ref 135–145)
WBC # BLD: 7.02 K/UL — SIGNIFICANT CHANGE UP (ref 3.8–10.5)
WBC # FLD AUTO: 7.02 K/UL — SIGNIFICANT CHANGE UP (ref 3.8–10.5)

## 2022-07-29 PROCEDURE — 97163 PT EVAL HIGH COMPLEX 45 MIN: CPT | Mod: GP

## 2022-07-29 PROCEDURE — 27447 TOTAL KNEE ARTHROPLASTY: CPT | Mod: GC,LT

## 2022-07-29 PROCEDURE — 97116 GAIT TRAINING THERAPY: CPT | Mod: GP

## 2022-07-29 PROCEDURE — 36415 COLL VENOUS BLD VENIPUNCTURE: CPT

## 2022-07-29 PROCEDURE — 99221 1ST HOSP IP/OBS SF/LOW 40: CPT

## 2022-07-29 PROCEDURE — 88305 TISSUE EXAM BY PATHOLOGIST: CPT | Mod: 26

## 2022-07-29 PROCEDURE — 82962 GLUCOSE BLOOD TEST: CPT

## 2022-07-29 PROCEDURE — 97530 THERAPEUTIC ACTIVITIES: CPT | Mod: GP

## 2022-07-29 PROCEDURE — 94640 AIRWAY INHALATION TREATMENT: CPT

## 2022-07-29 PROCEDURE — 73560 X-RAY EXAM OF KNEE 1 OR 2: CPT | Mod: 26,LT

## 2022-07-29 PROCEDURE — C1776: CPT

## 2022-07-29 PROCEDURE — 80048 BASIC METABOLIC PNL TOTAL CA: CPT

## 2022-07-29 PROCEDURE — 73560 X-RAY EXAM OF KNEE 1 OR 2: CPT | Mod: LT

## 2022-07-29 PROCEDURE — 88305 TISSUE EXAM BY PATHOLOGIST: CPT

## 2022-07-29 PROCEDURE — 85027 COMPLETE CBC AUTOMATED: CPT

## 2022-07-29 PROCEDURE — C1713: CPT

## 2022-07-29 RX ORDER — TIOTROPIUM BROMIDE 18 UG/1
1 CAPSULE ORAL; RESPIRATORY (INHALATION) DAILY
Refills: 0 | Status: DISCONTINUED | OUTPATIENT
Start: 2022-07-29 | End: 2022-08-01

## 2022-07-29 RX ORDER — ALBUTEROL 90 UG/1
2 AEROSOL, METERED ORAL EVERY 6 HOURS
Refills: 0 | Status: DISCONTINUED | OUTPATIENT
Start: 2022-07-29 | End: 2022-08-01

## 2022-07-29 RX ORDER — FERROUS SULFATE 325(65) MG
325 TABLET ORAL DAILY
Refills: 0 | Status: DISCONTINUED | OUTPATIENT
Start: 2022-07-29 | End: 2022-08-01

## 2022-07-29 RX ORDER — DILTIAZEM HCL 120 MG
60 CAPSULE, EXT RELEASE 24 HR ORAL
Refills: 0 | Status: DISCONTINUED | OUTPATIENT
Start: 2022-07-29 | End: 2022-08-01

## 2022-07-29 RX ORDER — ACETAMINOPHEN 500 MG
975 TABLET ORAL ONCE
Refills: 0 | Status: COMPLETED | OUTPATIENT
Start: 2022-07-29 | End: 2022-07-29

## 2022-07-29 RX ORDER — CELECOXIB 200 MG/1
200 CAPSULE ORAL EVERY 12 HOURS
Refills: 0 | Status: DISCONTINUED | OUTPATIENT
Start: 2022-07-30 | End: 2022-07-31

## 2022-07-29 RX ORDER — LISINOPRIL 2.5 MG/1
20 TABLET ORAL
Refills: 0 | Status: DISCONTINUED | OUTPATIENT
Start: 2022-07-29 | End: 2022-08-01

## 2022-07-29 RX ORDER — OXYCODONE HYDROCHLORIDE 5 MG/1
1 TABLET ORAL
Qty: 30 | Refills: 0
Start: 2022-07-29 | End: 2022-08-02

## 2022-07-29 RX ORDER — DILTIAZEM HCL 120 MG
60 CAPSULE, EXT RELEASE 24 HR ORAL
Refills: 0 | Status: DISCONTINUED | OUTPATIENT
Start: 2022-07-29 | End: 2022-07-29

## 2022-07-29 RX ORDER — PROCHLORPERAZINE MALEATE 5 MG
5 TABLET ORAL EVERY 8 HOURS
Refills: 0 | Status: DISCONTINUED | OUTPATIENT
Start: 2022-07-29 | End: 2022-08-01

## 2022-07-29 RX ORDER — PANTOPRAZOLE SODIUM 20 MG/1
40 TABLET, DELAYED RELEASE ORAL ONCE
Refills: 0 | Status: COMPLETED | OUTPATIENT
Start: 2022-07-29 | End: 2022-07-29

## 2022-07-29 RX ORDER — ASCORBIC ACID 60 MG
500 TABLET,CHEWABLE ORAL
Refills: 0 | Status: DISCONTINUED | OUTPATIENT
Start: 2022-07-29 | End: 2022-08-01

## 2022-07-29 RX ORDER — FOLIC ACID 0.8 MG
1 TABLET ORAL DAILY
Refills: 0 | Status: DISCONTINUED | OUTPATIENT
Start: 2022-07-29 | End: 2022-08-01

## 2022-07-29 RX ORDER — PANTOPRAZOLE SODIUM 20 MG/1
1 TABLET, DELAYED RELEASE ORAL
Qty: 30 | Refills: 0
Start: 2022-07-29 | End: 2022-08-27

## 2022-07-29 RX ORDER — SODIUM CHLORIDE 9 MG/ML
500 INJECTION, SOLUTION INTRAVENOUS ONCE
Refills: 0 | Status: COMPLETED | OUTPATIENT
Start: 2022-07-29 | End: 2022-07-29

## 2022-07-29 RX ORDER — APIXABAN 2.5 MG/1
5 TABLET, FILM COATED ORAL
Refills: 0 | Status: DISCONTINUED | OUTPATIENT
Start: 2022-07-29 | End: 2022-08-01

## 2022-07-29 RX ORDER — OXYCODONE HYDROCHLORIDE 5 MG/1
5 TABLET ORAL EVERY 4 HOURS
Refills: 0 | Status: DISCONTINUED | OUTPATIENT
Start: 2022-07-29 | End: 2022-08-01

## 2022-07-29 RX ORDER — CEFAZOLIN SODIUM 1 G
2000 VIAL (EA) INJECTION EVERY 8 HOURS
Refills: 0 | Status: COMPLETED | OUTPATIENT
Start: 2022-07-29 | End: 2022-07-30

## 2022-07-29 RX ORDER — POLYETHYLENE GLYCOL 3350 17 G/17G
17 POWDER, FOR SOLUTION ORAL AT BEDTIME
Refills: 0 | Status: DISCONTINUED | OUTPATIENT
Start: 2022-07-29 | End: 2022-08-01

## 2022-07-29 RX ORDER — HYDROCHLOROTHIAZIDE 25 MG
25 TABLET ORAL DAILY
Refills: 0 | Status: DISCONTINUED | OUTPATIENT
Start: 2022-07-29 | End: 2022-07-31

## 2022-07-29 RX ORDER — SODIUM CHLORIDE 9 MG/ML
1000 INJECTION, SOLUTION INTRAVENOUS
Refills: 0 | Status: DISCONTINUED | OUTPATIENT
Start: 2022-07-29 | End: 2022-08-01

## 2022-07-29 RX ORDER — LISINOPRIL 2.5 MG/1
20 TABLET ORAL DAILY
Refills: 0 | Status: DISCONTINUED | OUTPATIENT
Start: 2022-07-29 | End: 2022-07-29

## 2022-07-29 RX ORDER — HYDROMORPHONE HYDROCHLORIDE 2 MG/ML
0.5 INJECTION INTRAMUSCULAR; INTRAVENOUS; SUBCUTANEOUS EVERY 4 HOURS
Refills: 0 | Status: DISCONTINUED | OUTPATIENT
Start: 2022-07-29 | End: 2022-08-01

## 2022-07-29 RX ORDER — ACETAMINOPHEN 500 MG
975 TABLET ORAL EVERY 8 HOURS
Refills: 0 | Status: DISCONTINUED | OUTPATIENT
Start: 2022-07-29 | End: 2022-08-01

## 2022-07-29 RX ORDER — DILTIAZEM HCL 120 MG
300 CAPSULE, EXT RELEASE 24 HR ORAL DAILY
Refills: 0 | Status: DISCONTINUED | OUTPATIENT
Start: 2022-07-29 | End: 2022-08-01

## 2022-07-29 RX ORDER — SENNA PLUS 8.6 MG/1
2 TABLET ORAL AT BEDTIME
Refills: 0 | Status: DISCONTINUED | OUTPATIENT
Start: 2022-07-29 | End: 2022-08-01

## 2022-07-29 RX ORDER — OXYCODONE HYDROCHLORIDE 5 MG/1
10 TABLET ORAL EVERY 4 HOURS
Refills: 0 | Status: DISCONTINUED | OUTPATIENT
Start: 2022-07-29 | End: 2022-08-01

## 2022-07-29 RX ORDER — ONDANSETRON 8 MG/1
8 TABLET, FILM COATED ORAL EVERY 8 HOURS
Refills: 0 | Status: DISCONTINUED | OUTPATIENT
Start: 2022-07-29 | End: 2022-08-01

## 2022-07-29 RX ORDER — ATORVASTATIN CALCIUM 80 MG/1
40 TABLET, FILM COATED ORAL AT BEDTIME
Refills: 0 | Status: DISCONTINUED | OUTPATIENT
Start: 2022-07-29 | End: 2022-08-01

## 2022-07-29 RX ORDER — PANTOPRAZOLE SODIUM 20 MG/1
40 TABLET, DELAYED RELEASE ORAL
Refills: 0 | Status: DISCONTINUED | OUTPATIENT
Start: 2022-07-29 | End: 2022-08-01

## 2022-07-29 RX ORDER — METOPROLOL TARTRATE 50 MG
25 TABLET ORAL
Refills: 0 | Status: DISCONTINUED | OUTPATIENT
Start: 2022-07-29 | End: 2022-08-01

## 2022-07-29 RX ORDER — BUDESONIDE AND FORMOTEROL FUMARATE DIHYDRATE 160; 4.5 UG/1; UG/1
2 AEROSOL RESPIRATORY (INHALATION)
Refills: 0 | Status: DISCONTINUED | OUTPATIENT
Start: 2022-07-29 | End: 2022-08-01

## 2022-07-29 RX ADMIN — OXYCODONE HYDROCHLORIDE 10 MILLIGRAM(S): 5 TABLET ORAL at 23:14

## 2022-07-29 RX ADMIN — APIXABAN 5 MILLIGRAM(S): 2.5 TABLET, FILM COATED ORAL at 22:45

## 2022-07-29 RX ADMIN — Medication 975 MILLIGRAM(S): at 18:23

## 2022-07-29 RX ADMIN — OXYCODONE HYDROCHLORIDE 10 MILLIGRAM(S): 5 TABLET ORAL at 22:44

## 2022-07-29 RX ADMIN — Medication 975 MILLIGRAM(S): at 09:00

## 2022-07-29 RX ADMIN — BUDESONIDE AND FORMOTEROL FUMARATE DIHYDRATE 2 PUFF(S): 160; 4.5 AEROSOL RESPIRATORY (INHALATION) at 20:08

## 2022-07-29 RX ADMIN — Medication 100 MILLIGRAM(S): at 21:00

## 2022-07-29 RX ADMIN — Medication 975 MILLIGRAM(S): at 18:45

## 2022-07-29 RX ADMIN — PANTOPRAZOLE SODIUM 40 MILLIGRAM(S): 20 TABLET, DELAYED RELEASE ORAL at 08:47

## 2022-07-29 RX ADMIN — SODIUM CHLORIDE 1500 MILLILITER(S): 9 INJECTION, SOLUTION INTRAVENOUS at 13:50

## 2022-07-29 RX ADMIN — Medication 25 MILLIGRAM(S): at 22:45

## 2022-07-29 RX ADMIN — Medication 60 MILLIGRAM(S): at 22:46

## 2022-07-29 RX ADMIN — ATORVASTATIN CALCIUM 40 MILLIGRAM(S): 80 TABLET, FILM COATED ORAL at 22:45

## 2022-07-29 RX ADMIN — Medication 500 MILLIGRAM(S): at 22:46

## 2022-07-29 RX ADMIN — SODIUM CHLORIDE 1000 MILLILITER(S): 9 INJECTION, SOLUTION INTRAVENOUS at 13:45

## 2022-07-29 NOTE — BRIEF OPERATIVE NOTE - NSICDXBRIEFPOSTOP_GEN_ALL_CORE_FT
Patient would like to be stronger and be able to get around at home without getting too "out of breath"
POST-OP DIAGNOSIS:  Osteoarthritis of left knee 29-Jul-2022 12:58:36  Ghassan Chan

## 2022-07-29 NOTE — CONSULT NOTE ADULT - ASSESSMENT
This is a 77 year old female with hx of A. Fib on Eliquis 5mg [id number] Score: 4, osteoarthritis, presented to [facility] for planned left tkr. Pt has high thrombotic risk and require anticoagulation treatment dose.         Plan:  resume her Eliquis 5mg po twice a day at 22:00  Daily CBC/BMP  LE Venodyne  :increase mobility as per ortho  :thanks for consult will f/u

## 2022-07-29 NOTE — CONSULT NOTE ADULT - SUBJECTIVE AND OBJECTIVE BOX
CC- LT TKR    HPI:  78yo/F with PMH afib on Eliquis, HTN, hyperlipidemia, COPD, tachy-maryam s/p PPM,  breast ca s/p LT mastectomy, melanoma back, OA presented for elective LT TKR. Patient had pain in her LT knee affecting ambulation and daily activities, she failed outpatient treatment and scheduled for TKR. Medical consult called for postop medical management    PMH- as above  PSH- LT mastectomy, appendectomy, tonsillectomy  Soc hx- ex-alcoholic, ex-smoker quit  Fam hx- f pancreatic ca,    7/29/22- seen postop in PACU, spinal wearing off, sensation not completely back yet    Review of system- All 10 systems reviewed and is as per HPI otherwise negative.     T(C): 36.4 (07-29-22 @ 16:45), Max: 36.8 (07-29-22 @ 08:26)  HR: 63 (07-29-22 @ 16:45) (50 - 73)  BP: 125/82 (07-29-22 @ 16:45) (88/48 - 141/71)  RR: 19 (07-29-22 @ 16:45) (13 - 24)  SpO2: 99% (07-29-22 @ 16:45) (95% - 100%)  Wt(kg): --    LABS:                        13.7   7.02  )-----------( 291      ( 29 Jul 2022 12:45 )             41.0     07-29    141  |  110<H>  |  23  ----------------------------<  106<H>  3.7   |  25  |  1.33<H>    Ca    9.1      29 Jul 2022 12:45    CAPILLARY BLOOD GLUCOSE  POCT Blood Glucose.: 101 mg/dL (29 Jul 2022 12:31)  POCT Blood Glucose.: 112 mg/dL (29 Jul 2022 08:55)    RADIOLOGY & ADDITIONAL TESTS:      PHYSICAL EXAM:  GENERAL: NAD, well-groomed, well-developed  HEAD:  Atraumatic, Normocephalic  EYES: EOMI, PERRLA, conjunctiva and sclera clear  HEENT: Moist mucous membranes  NECK: Supple, No JVD  NERVOUS SYSTEM:  Alert & Oriented X3, Motor Strength 5/5 B/L upper and lower extremities; DTRs 2+ intact and symmetric  CHEST/LUNG: Clear to auscultation bilaterally; No rales, rhonchi, wheezing, or rubs  HEART: Regular rate and rhythm; No murmurs, rubs, or gallops  ABDOMEN: Soft, Nontender, Nondistended; Bowel sounds present  GENITOURINARY- Voiding, no palpable bladder  EXTREMITIES:  2+ Peripheral Pulses, No clubbing, cyanosis, or edema  MUSCULOSKELTAL- LT knee dressing dry  SKIN-no rash, no lesion  CNS- alert, oriented X3, non focal       Daily Height in cm: 160.02 (29 Jul 2022 08:26)      apixaban 5 milliGRAM(s) Oral two times a day  fentaNYL    Injectable 50 MICROGram(s) IV Push every 10 minutes PRN  lactated ringers. 1000 milliLiter(s) IV Continuous <Continuous>  ondansetron Injectable 4 milliGRAM(s) IV Push once PRN  oxyCODONE    IR 5 milliGRAM(s) Oral once PRN    Assessment/Plan  #LT TKR  Ortho following  PT as tolerated  AC by eliquis  Monitor HH postop  Pain meds prn  Bowel regimen  Incentive spirometry    #Afib/ s/p PPM  Back on Eliquis    #COPD  Cont home inhalers    #HTN- monitor for postop hypotension in view of anesthesia/ volume loss intraop etc    #DVt proph- eliquis    #Dispo-- thank you for consult, will follow    
  HPI: This is a 77 year old female diagnosed with OA left knee c/o left knee pain; presents to [facility] for planned left TKR.        Patient is a 77y old  Female who presents with a chief complaint of LEFT KNEE PAIN.     Consulted by Dr. Jerson Mast for VTE prophylaxis, risk stratification, and anticoagulation management.    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      Afib  on Eliquis      Malignant neoplasm of left female breast, unspecified estrogen receptor status, unspecified site of breast   , h/o chemo      Hyperlipidemia, unspecified hyperlipidemia type      COPD with emphysema  1 or less exacerbations/year      Hypertension      Borderline type 2 diabetes mellitus      Former smoker      Erythrocytosis  Chronic, compensatory - mild      Osteoarthritis  both knees L&gt;R      Left knee pain      History of ETOH abuse  last drink       On home oxygen therapy  2L as needed      Melanoma  back      Pneumonitis      Fatty liver      Anxiety      Thyroid nodule      Epistaxis  2022      H/O mastectomy, left   axillary node dissection      Artificial pacemaker  Medtronic Model [id number]  initially 2009,  generator change 2020 ,      History of arthroscopy of left knee        History of appendectomy  2018      Pacemaker lead fracture  replace atrial only 2021      History of tonsillectomy          FAMILY HISTORY:  FH: pancreatic cancer (Father)        Interval Note:  2022 Pt seen in PACU at bedside.dISCUSSED WITH PT RESUMING HER ELIQUIS tonight.  Pt has o concerns s/p left tkr.       CAPRINI SCORE  AGE RELATED RISK FACTORS                                                       MOBILITY RELATED FACTORS  [ ] Age 41-60 years                                            (1 Point)                  [ ] Bed rest                                                        (1 Point)  [ ] Age: 61-74 years                                           (2 Points)                [ ] Plaster cast                                                   (2 Points)  [x ] Age= 75 years                                              (3 Points)                 [ ] Bed bound for more than 72 hours                   (2 Points)    DISEASE RELATED RISK FACTORS                                               GENDER SPECIFIC FACTORS  [ ] Edema in the lower extremities                       (1 Point)           [ ] Pregnancy                                                            (1 Point)  [ ] Varicose veins                                               (1 Point)                  [ ] Post-partum < 6 weeks                                      (1 Point)             [x ] BMI > 25 Kg/m2                                            (1 Point)                  [ ] Hormonal therapy or oral contraception       (1 Point)                 [ ] Sepsis (in the previous month)                        (1 Point)             [ ] History of pregnancy complications                (1Point)  [ ] Pneumonia or serious lung disease                                             [ ] Unexplained or recurrent  (=/>3), premature                                 (In the previous month)                               (1 Point)                birth with toxemia or growth-restricted infant (1 Point)  [ ] Abnormal pulmonary function test            (1 Point)                                   SURGERY RELATED RISK FACTORS  [ ] Acute myocardial infarction                       (1 Point)                  [ ]  Section                                         (1 Point)  [ ] Congestive heart failure (in the previous month) (1 Point)   [ ] Minor surgery   lasting <45 minutes       (1 Point)   [ ] Inflammatory bowel disease                             (1 Point)          [ ] Arthroscopic surgery                                  (2 Points)  [ ] Central venous access                                    (2 Points)            [ ] General surgery lasting >45 minutes      (2 Points)       [ ] Stroke (in the previous month)                  (5 Points)            [x ] Elective major lower extremity arthroplasty (5 Points)                                   [ x ] Malignancy (present or past include skin melanoma                                          but exclude  basal skin cell)    (2 points)                                      TRAUMA RELATED RISK FACTORS                HEMATOLOGY RELATED FACTORS                                  [ ] Fracture of the hip, pelvis, or leg                       (5 Points)  [ ] Prior episodes of VTE                                     (3 Points)          [ ] Acute spinal cord injury (in the previous month)  (5 Points)  [ ] Positive family history for VTE                         (3 Points)       [ ] Paralysis (less than 1 month)                          (5 Points)  [ ] Prothrombin 10208 A                                      (3 Points)         [ ] Multiple Trauma (within 1month)                 (5Points)                                                                                                                                                                [ ] Factor V Leiden                                          (3 Points)                                OTHER RISK FACTORS                          [ ] Lupus anticoagulants                                     (3 Points)                       [ ] BMI > 40                          (1 Point)                                                         [ ] Anticardiolipin antibodies                                (3 Points)                   [ ] Smoking                              (1Point)                                                [ ] High homocysteine in the blood                      (3 Points)                [  ] Diabetes requiring insulin (1point)                         [ ] Other congenital or acquired thrombophilia       (3 Points)          [  ] Chemotherapy                   (1 Point)  [ ] Heparin induced thrombocytopenia                  (3 Points)             [  ] Blood Transfusion                (1 point)                                                                                                             Total Score [ 11 ]                                                                                                                                                                                                                                                                                                                                                                                                                                           [id number] Score: 4     IMPROVE Bleeding Risk Score:    Falls Risk:   High (  )  Mod (  x)  Low (  )  crcl:  39.8       cr: 1.33         BMI 28.4                    Denies any personal or familial history of clotting or bleeding disorders.    Allergies    No Known Allergies    Intolerances    Mobic (Other)      REVIEW OF SYSTEMS    (  )Fever	     (  )Constipation	(  )SOB				(  )Headache	(  )Dysuria  (  )Chills	     (  )Melena	(  )Dyspnea present on exertion	                    (  )Dizziness                    (  )Polyuria  (  )Nausea	     (  )Hematochezia	(  )Cough			                    (  )Syncope   	(  )Hematuria  (  )Vomiting    (  )Chest Pain	(  )Wheezing			(  )Weakness (X) KNEE PAIN  (  )Diarrhea     (  )Palpitations	(  )Anorexia			(  )Myalgia    Pertinent positives in HPI and daily subjective.  All other ROS negative.    Vital Signs Last 24 Hrs  T(C): 36.4 (22 @ 16:45), Max: 36.8 (22 @ 08:26)  T(F): 97.5 (22 @ 16:45), Max: 98.3 (22 @ 08:26)  HR: 63 (22 @ 16:45) (50 - 73)  BP: 125/82 (22 @ 16:45) (88/48 - 141/71)  BP(mean): --  RR: 19 (22 @ 16:45) (13 - 24)  SpO2: 99% (22 @ 16:45) (95% - 100%)        PHYSICAL EXAM:    Constitutional: Appears Well    Neurological: A& O x 3    Skin: Warm    Respiratory and Thorax: normal effort; Breath sounds: normal; No rales/wheezing/rhonchi  	  Cardiovascular: S1, S2, regular, NMBR	    Gastrointestinal: BS + x 4Q, nontender	    Genitourinary:  Bladder nondistended, nontender    Musculoskeletal:   General Right:   no muscle/joint tenderness,   normal tone, no joint swelling,   ROM: full	    General Left:   no muscle/joint tenderness,   normal tone, no joint swelling,   ROM: full      Lower extrems:   Right: no calf tenderness              negative julián's sign               + pedal pulses    Left:   no calf tenderness              negative julián's sign               + pedal pulses    Knee rt: dsg CDI      MEDICATIONS  (STANDING):  apixaban 5 milliGRAM(s) Oral two times a day  lactated ringers. 1000 milliLiter(s) IV Continuous <Continuous>  			              DVT Prophylaxis:  LMWH                   (  )  Heparin SQ           (  )  Coumadin             (  )  Xarelto                  (  )  Eliquis                   (X  )  Venodynes           (X  )  Ambulation          (x  )  UFH                       (  )  Contraindicated  (  )  EC Aspirin             (  )

## 2022-07-29 NOTE — DISCHARGE NOTE PROVIDER - NSDCCPCAREPLAN_GEN_ALL_CORE_FT
PRINCIPAL DISCHARGE DIAGNOSIS  Diagnosis: Primary osteoarthritis of left knee  Assessment and Plan of Treatment:        PRINCIPAL DISCHARGE DIAGNOSIS  Diagnosis: Primary osteoarthritis of left knee  Assessment and Plan of Treatment:       SECONDARY DISCHARGE DIAGNOSES  Diagnosis: COPD with acute exacerbation  Assessment and Plan of Treatment: take prednisone as prescribed.   if any worsening of symptoms call dr. Justin.

## 2022-07-29 NOTE — DISCHARGE NOTE PROVIDER - NSDCFUADDINST_GEN_ALL_CORE_FT
Discharge Instructions for Left Total Knee Arthroplasty:    1. ACTIVITY: WBAT, Rolling walker, Daily PT. Gentle ROM 0-full as tolerated. Walk plenty. Keep a bump (rolled towel or blanket) under your heel to keep leg straight while in bed or chair for 2 weeks.  2. CALL FOR: fever over 101, wound redness, drainage or open area, calf pain/calf swelling.  3. BANDAGE: Change dressing to a new Mepilex Ag bandage POD7 (8/5/22). May change sooner if dressing saturated or falling off. DO NOT REMOVE BANDAGE TO CHECK WOUND ON INTAKE.  4. STAPLES: RN Remove Staples POD14 (8/12/22).  5. SHOWER: Okay to shower. Do not soak, submerge or let shower stream beat on dressing/wound.  6. DVT PE PROPHYLAXIS: Managed by Anticoag Team. See Med Rec.  7. GI: Continue Protonix daily while on Anticoagulant. an eRx has been sent to your pharmacy.  8. LABS:  INR if on Coumadin.  9. FOLLOW UP: Dr. Mast in 21 days (1 week after staples removed);  Call to schedule.   10. MEDICATIONS:  If going home, eRX sent to your pharmacy for .   11.**Call office if medications not covered under your insurance, especially BLOOD CLOT PREVENTION/anticoagulant medication.

## 2022-07-29 NOTE — DISCHARGE NOTE PROVIDER - NSDCMRMEDTOKEN_GEN_ALL_CORE_FT
Advair HFA: 2 puffs 2x/day   benazepril 20 mg oral tablet: 1 tab(s) orally 2 times a day  Cardizem 60 mg oral tablet: 1 tab(s) orally-HS   Cardizem  mg/24 hours oral tablet, extended release: 1 tab(s) orally once a day  cholecalciferol 1000 intl units oral tablet: 2 tab(s) orally once a day  CoQ10: 100 milligram(s) orally once a day  Crestor 10 mg oral tablet: 1 tab(s) orally once a day  Eliquis 5 mg oral tablet: 1 tab(s) orally 2 times a day         enoxaparin 40 mg/0.4 mL injectable solution: 40 milligram(s) subcutaneously once a day MDD:40mg take as directed by anticoagulation service.  last dose on Thursday a.m. 7-  Fish Oil oral capsule:   hydroCHLOROthiazide 25 mg oral tablet: 1 tab(s) orally once a day, As Needed ( BP &gt;135 )   Incruse Ellipta 62.5 mcg/inh inhalation powder: 1 puff(s) inhaled every 24 hours  MiraLax oral powder for reconstitution: 17 gram(s) orally once a day, As Needed   Multiple Vitamins oral tablet: 1 tab(s) orally once a day  ondansetron 4 mg oral tablet: 1 tab(s) orally every 8 hours, As Needed for nausea  oxyCODONE 5 mg oral tablet: 1 tab(s) orally every 4 hours as needed for severe pain; MDD:6  oxyCODONE 5 mg oral tablet: 1 tab(s) orally every 6 hours MDD:6 tablets  pantoprazole 40 mg oral delayed release tablet: 1 tab(s) orally once a day   ProAir HFA 90 mcg/inh inhalation aerosol: 2 puff(s) inhaled every 6 hours, As Needed - for shortness of breath and/or wheezing  Protonix 40 mg oral delayed release tablet: 1 tab(s) orally once a day   Robitussin: 200 milligram(s) orally , As Needed  senna oral tablet: 1 tab(s) orally once a day, As Needed -for constipation   Toprol-XL 25 mg oral tablet, extended release: 1 tab(s) orally 2x/day  Tylenol Extra Strength 500 mg oral tablet: 2 tab(s) orally 3 times a day   Xanax 0.5 mg oral tablet: 1 tab(s) orally 3 times a day, As Needed. Do not take while taking narcotic pain medication.   zolpidem 10 mg oral tablet: 1 tab(s) orally once a day (at bedtime), As Needed. Do not take while taking narcotic pain medication.    Advair HFA: 2 puffs 2x/day   benazepril 20 mg oral tablet: 1 tab(s) orally 2 times a day  Cardizem 60 mg oral tablet: 1 tab(s) orally-HS   Cardizem  mg/24 hours oral tablet, extended release: 1 tab(s) orally once a day  cholecalciferol 1000 intl units oral tablet: 2 tab(s) orally once a day  CoQ10: 100 milligram(s) orally once a day  Crestor 10 mg oral tablet: 1 tab(s) orally once a day  Eliquis 5 mg oral tablet: 1 tab(s) orally 2 times a day         Fish Oil oral capsule:   hydroCHLOROthiazide 25 mg oral tablet: 1 tab(s) orally once a day, As Needed ( BP &gt;135 )   Incruse Ellipta 62.5 mcg/inh inhalation powder: 1 puff(s) inhaled every 24 hours  MiraLax oral powder for reconstitution: 17 gram(s) orally once a day, As Needed   Multiple Vitamins oral tablet: 1 tab(s) orally once a day  ondansetron 4 mg oral tablet: 1 tab(s) orally every 8 hours, As Needed for nausea  oxyCODONE 5 mg oral tablet: 1 tab(s) orally every 4 hours as needed for severe pain; MDD:6  pantoprazole 40 mg oral delayed release tablet: 1 tab(s) orally once a day   predniSONE 20 mg oral tablet: 2 tab(s) orally once a day X 2 days, then 1 tab orally X 2 days, then 0.5 tab oral daily X 2 days, then stop.   ProAir HFA 90 mcg/inh inhalation aerosol: 2 puff(s) inhaled every 6 hours, As Needed - for shortness of breath and/or wheezing  Protonix 40 mg oral delayed release tablet: 1 tab(s) orally once a day   senna oral tablet: 1 tab(s) orally once a day, As Needed -for constipation   Toprol-XL 25 mg oral tablet, extended release: 1 tab(s) orally 2x/day  Xanax 0.5 mg oral tablet: 1 tab(s) orally 3 times a day, As Needed. Do not take while taking narcotic pain medication.   zolpidem 10 mg oral tablet: 1 tab(s) orally once a day (at bedtime), As Needed. Do not take while taking narcotic pain medication.

## 2022-07-29 NOTE — DISCHARGE NOTE PROVIDER - PROVIDER TOKENS
PROVIDER:[TOKEN:[[id number]]] PROVIDER:[TOKEN:[[id number]]],PROVIDER:[TOKEN:[[id number]],FOLLOWUP:[1 week]]

## 2022-07-29 NOTE — PHYSICAL THERAPY INITIAL EVALUATION ADULT - GENERAL OBSERVATIONS, REHAB EVAL
Pt. received in PACU with all monitors intact, O2 via mask. Pt. is very pleasant and agreeable to PT but was not ready for OOB assessment.

## 2022-07-29 NOTE — PHYSICAL THERAPY INITIAL EVALUATION ADULT - ADDITIONAL COMMENTS
Pt. reported lives alone and has one step to enter home and one step to kitchen. Pt. was IND with all ADL's and with NAD + drive.

## 2022-07-29 NOTE — PHYSICAL THERAPY INITIAL EVALUATION ADULT - PERTINENT HX OF CURRENT PROBLEM, REHAB EVAL
Pt. is 44 yo M s/p Left Total Knee Arthroplasty. Pt. is 76 yo female s/p Left Total Knee Arthroplasty due to OA.

## 2022-07-29 NOTE — PHYSICAL THERAPY INITIAL EVALUATION ADULT - PLANNED THERAPY INTERVENTIONS, PT EVAL
balance training/bed mobility training/gait training/manual therapy techniques/motor coordination training/neuromuscular re-education/postural re-education/ROM/strengthening/stretching/transfer training

## 2022-07-29 NOTE — DISCHARGE NOTE PROVIDER - CARE PROVIDER_API CALL
Jerson Mast)  Orthopaedic Surgery  [address] [place] [address]  Phone: [phone]  Fax: [phone]  Follow Up Time:    Jerson Mast)  Orthopaedic Surgery  [address] [place] [address]  Phone: [phone]  Fax: [phone]  Follow Up Time:     SONIA Justin ()  Pulmonary Disease; Sleep Medicine  [address] [place] [address]  Phone: [phone]  Fax: [phone]  Follow Up Time: 1 week

## 2022-07-29 NOTE — DISCHARGE NOTE PROVIDER - HOSPITAL COURSE
H&P:  Pt is a 77y Female   Multiple PMHx     Now s/p Left Total Knee Arthroplasty. Pt is afebrile with stable vital signs. Pain is controlled. Alert and Oriented. Exam reveals intact EHL FHL TA GS, +DP. Dressing is clean and dry.    Hospital Course:  Patient presented to [facility] medically cleared for elective Left Total Knee Replacement Surgery, having failed outpatient conservative management. Prophylactic antibiotics were started before the procedure and continued for 24 hours. They were admitted after surgery to the orthopedic floor. There were no complications during the hospital stay. All home medications were continued.     **Pt received **U PRBC post op for Acute Blood Loss Anemia.    Routine consults were obtained from the Anticoagulation Team for DVT/PE prophylaxis, from Physical Therapy for twice daily PT, and from the Hospitalist for Medical Co-management. Patient was placed on anticoagulation. Pertinent home medications were continued. Daily labs were followed.      On POD 0 pt was stable overnight. No major events post-op. Pt received twice daily PT. The plan is for DC to home with home PT** or to Rehab for ongoing PT**. The orthopedic Attending is aware and agrees.    **POD1 DC DOCUMENTAION** (Keep or Delete depending on scenario)  The patient had no post-operative complications and clinically progressed faster than anticipated. The following condition(s) were actively treated during the hospital stay:  Diabetes  HTN  BMI>=35  Cardiac/Cardiovascular disease (MI, CAD, HF, Stroke, Cardiac Arrhythmia)  Pulmonary disease  Sleep Apnea  End stage renal disease  Hx of cirrhosis  Depression, Anxiety, hx of mental illness  Hx of DVT/VTE  Chronic pain, requiring narcotics  The patient met criteria for discharge before the 2nd night of the stay. The patient was appropriately and safely discharged home with home PT.    ******INCOMPLETE NOTE IN PREPARATION FOR DISPO PLANNING*******  ******INCOMPLETE NOTE IN PREPARATION FOR DISPO PLANNING*******  ******INCOMPLETE NOTE IN PREPARATION FOR DISPO PLANNING*******     H&P:  Pt is a 77y Female   Multiple PMHx     Now s/p Left Total Knee Arthroplasty. Pt is afebrile with stable vital signs. Pain is controlled. Alert and Oriented. Exam reveals intact EHL FHL TA GS, +DP. Dressing is clean and dry.    Hospital Course:  Patient presented to [facility] medically cleared for elective Left Total Knee Replacement Surgery, having failed outpatient conservative management. Prophylactic antibiotics were started before the procedure and continued for 24 hours. They were admitted after surgery to the orthopedic floor. There were no complications during the hospital stay. All home medications were continued.     Routine consults were obtained from the Anticoagulation Team for DVT/PE prophylaxis, from Physical Therapy for twice daily PT, and from the Hospitalist for Medical Co-management. Patient was placed on anticoagulation. Pertinent home medications were continued. Daily labs were followed.      On POD 0 pt was stable overnight. No major events post-op. Pt received twice daily PT. The plan is for DC to home with home PT. The orthopedic Attending is aware and agrees.    The patient had no post-operative complications and clinically progressed faster than anticipated. The following condition(s) were actively treated during the hospital stay:  Diabetes  HTN  BMI>=35  Cardiac/Cardiovascular disease (MI, CAD, HF, Stroke, Cardiac Arrhythmia)  Pulmonary disease  Sleep Apnea  End stage renal disease  Hx of cirrhosis  Depression, Anxiety, hx of mental illness  Hx of DVT/VTE  Chronic pain, requiring narcotics  The patient met criteria for discharge before the 2nd night of the stay. The patient was appropriately and safely discharged home with home PT.         H&P:  Pt is a 77y Female   Multiple PMHx     Now s/p Left Total Knee Arthroplasty. Pt is afebrile with stable vital signs. Pain is controlled. Alert and Oriented. Exam reveals intact EHL FHL TA GS, +DP. Dressing is clean and dry.    Hospital Course:  Patient presented to [facility] medically cleared for elective Left Total Knee Replacement Surgery, having failed outpatient conservative management. Prophylactic antibiotics were started before the procedure and continued for 24 hours. They were admitted after surgery to the orthopedic floor. There were no complications during the hospital stay. All home medications were continued.     Routine consults were obtained from the Anticoagulation Team for DVT/PE prophylaxis, from Physical Therapy for twice daily PT, and from the Hospitalist for Medical Co-management. Patient was placed on anticoagulation. Pertinent home medications were continued. Daily labs were followed.      On POD 0 pt was stable overnight. No major events post-op. Pt received twice daily PT. The plan is for DC to home with home PT. The orthopedic Attending is aware and agrees.    The patient had no post-operative complications and clinically progressed faster than anticipated. The following condition(s) were actively treated during the hospital stay:  Diabetes  HTN  BMI>=35  Cardiac/Cardiovascular disease (MI, CAD, HF, Stroke, Cardiac Arrhythmia)  Pulmonary disease  Sleep Apnea  End stage renal disease  Hx of cirrhosis  Depression, Anxiety, hx of mental illness  Hx of DVT/VTE  Chronic pain, requiring narcotics  The patient met criteria for discharge before the 2nd night of the stay. The patient was appropriately and safely discharged home with home PT.    Hospitalist addendum:  hospital course complicated by acute copd exacerbation requiring iv steroids. This has since improved and she will be discharged on a steroid taper.   she is advised to call dr. Justin if any worsening sob/wheeze/copd symptoms.

## 2022-07-29 NOTE — DISCHARGE NOTE PROVIDER - CARE PROVIDERS DIRECT ADDRESSES
,[email or web address]

## 2022-07-30 LAB
ANION GAP SERPL CALC-SCNC: 5 MMOL/L — SIGNIFICANT CHANGE UP (ref 5–17)
BUN SERPL-MCNC: 17 MG/DL — SIGNIFICANT CHANGE UP (ref 7–23)
CALCIUM SERPL-MCNC: 8.8 MG/DL — SIGNIFICANT CHANGE UP (ref 8.5–10.1)
CHLORIDE SERPL-SCNC: 110 MMOL/L — HIGH (ref 96–108)
CO2 SERPL-SCNC: 25 MMOL/L — SIGNIFICANT CHANGE UP (ref 22–31)
CREAT SERPL-MCNC: 1.14 MG/DL — SIGNIFICANT CHANGE UP (ref 0.5–1.3)
EGFR: 49 ML/MIN/1.73M2 — LOW
GLUCOSE SERPL-MCNC: 125 MG/DL — HIGH (ref 70–99)
HCT VFR BLD CALC: 39.5 % — SIGNIFICANT CHANGE UP (ref 34.5–45)
HGB BLD-MCNC: 13.3 G/DL — SIGNIFICANT CHANGE UP (ref 11.5–15.5)
MCHC RBC-ENTMCNC: 31.3 PG — SIGNIFICANT CHANGE UP (ref 27–34)
MCHC RBC-ENTMCNC: 33.7 GM/DL — SIGNIFICANT CHANGE UP (ref 32–36)
MCV RBC AUTO: 92.9 FL — SIGNIFICANT CHANGE UP (ref 80–100)
PLATELET # BLD AUTO: 257 K/UL — SIGNIFICANT CHANGE UP (ref 150–400)
POTASSIUM SERPL-MCNC: 4.2 MMOL/L — SIGNIFICANT CHANGE UP (ref 3.5–5.3)
POTASSIUM SERPL-SCNC: 4.2 MMOL/L — SIGNIFICANT CHANGE UP (ref 3.5–5.3)
RBC # BLD: 4.25 M/UL — SIGNIFICANT CHANGE UP (ref 3.8–5.2)
RBC # FLD: 14 % — SIGNIFICANT CHANGE UP (ref 10.3–14.5)
SODIUM SERPL-SCNC: 140 MMOL/L — SIGNIFICANT CHANGE UP (ref 135–145)
WBC # BLD: 11.04 K/UL — HIGH (ref 3.8–10.5)
WBC # FLD AUTO: 11.04 K/UL — HIGH (ref 3.8–10.5)

## 2022-07-30 PROCEDURE — 99232 SBSQ HOSP IP/OBS MODERATE 35: CPT

## 2022-07-30 PROCEDURE — 99231 SBSQ HOSP IP/OBS SF/LOW 25: CPT

## 2022-07-30 RX ADMIN — CELECOXIB 200 MILLIGRAM(S): 200 CAPSULE ORAL at 21:55

## 2022-07-30 RX ADMIN — Medication 325 MILLIGRAM(S): at 09:28

## 2022-07-30 RX ADMIN — BUDESONIDE AND FORMOTEROL FUMARATE DIHYDRATE 2 PUFF(S): 160; 4.5 AEROSOL RESPIRATORY (INHALATION) at 20:10

## 2022-07-30 RX ADMIN — Medication 25 MILLIGRAM(S): at 09:29

## 2022-07-30 RX ADMIN — Medication 100 MILLIGRAM(S): at 04:27

## 2022-07-30 RX ADMIN — Medication 60 MILLIGRAM(S): at 21:56

## 2022-07-30 RX ADMIN — Medication 500 MILLIGRAM(S): at 09:28

## 2022-07-30 RX ADMIN — Medication 975 MILLIGRAM(S): at 15:06

## 2022-07-30 RX ADMIN — Medication 975 MILLIGRAM(S): at 05:45

## 2022-07-30 RX ADMIN — APIXABAN 5 MILLIGRAM(S): 2.5 TABLET, FILM COATED ORAL at 21:54

## 2022-07-30 RX ADMIN — Medication 500 MILLIGRAM(S): at 21:54

## 2022-07-30 RX ADMIN — CELECOXIB 200 MILLIGRAM(S): 200 CAPSULE ORAL at 21:54

## 2022-07-30 RX ADMIN — PANTOPRAZOLE SODIUM 40 MILLIGRAM(S): 20 TABLET, DELAYED RELEASE ORAL at 05:46

## 2022-07-30 RX ADMIN — ATORVASTATIN CALCIUM 40 MILLIGRAM(S): 80 TABLET, FILM COATED ORAL at 21:55

## 2022-07-30 RX ADMIN — CELECOXIB 200 MILLIGRAM(S): 200 CAPSULE ORAL at 09:28

## 2022-07-30 RX ADMIN — Medication 975 MILLIGRAM(S): at 21:55

## 2022-07-30 RX ADMIN — LISINOPRIL 20 MILLIGRAM(S): 2.5 TABLET ORAL at 09:34

## 2022-07-30 RX ADMIN — Medication 25 MILLIGRAM(S): at 21:56

## 2022-07-30 RX ADMIN — BUDESONIDE AND FORMOTEROL FUMARATE DIHYDRATE 2 PUFF(S): 160; 4.5 AEROSOL RESPIRATORY (INHALATION) at 09:17

## 2022-07-30 RX ADMIN — OXYCODONE HYDROCHLORIDE 10 MILLIGRAM(S): 5 TABLET ORAL at 09:27

## 2022-07-30 RX ADMIN — Medication 1 TABLET(S): at 09:28

## 2022-07-30 RX ADMIN — Medication 300 MILLIGRAM(S): at 09:34

## 2022-07-30 RX ADMIN — OXYCODONE HYDROCHLORIDE 5 MILLIGRAM(S): 5 TABLET ORAL at 15:58

## 2022-07-30 RX ADMIN — Medication 1 MILLIGRAM(S): at 09:28

## 2022-07-30 RX ADMIN — APIXABAN 5 MILLIGRAM(S): 2.5 TABLET, FILM COATED ORAL at 09:34

## 2022-07-30 RX ADMIN — OXYCODONE HYDROCHLORIDE 10 MILLIGRAM(S): 5 TABLET ORAL at 10:20

## 2022-07-30 RX ADMIN — Medication 100 MILLIGRAM(S): at 18:25

## 2022-07-30 RX ADMIN — OXYCODONE HYDROCHLORIDE 5 MILLIGRAM(S): 5 TABLET ORAL at 15:08

## 2022-07-31 ENCOUNTER — TRANSCRIPTION ENCOUNTER (OUTPATIENT)
Age: 77
End: 2022-07-31

## 2022-07-31 LAB
ANION GAP SERPL CALC-SCNC: 6 MMOL/L — SIGNIFICANT CHANGE UP (ref 5–17)
BUN SERPL-MCNC: 25 MG/DL — HIGH (ref 7–23)
CALCIUM SERPL-MCNC: 8.8 MG/DL — SIGNIFICANT CHANGE UP (ref 8.5–10.1)
CHLORIDE SERPL-SCNC: 104 MMOL/L — SIGNIFICANT CHANGE UP (ref 96–108)
CO2 SERPL-SCNC: 26 MMOL/L — SIGNIFICANT CHANGE UP (ref 22–31)
CREAT SERPL-MCNC: 1.54 MG/DL — HIGH (ref 0.5–1.3)
EGFR: 34 ML/MIN/1.73M2 — LOW
GLUCOSE SERPL-MCNC: 102 MG/DL — HIGH (ref 70–99)
HCT VFR BLD CALC: 35.4 % — SIGNIFICANT CHANGE UP (ref 34.5–45)
HGB BLD-MCNC: 11.7 G/DL — SIGNIFICANT CHANGE UP (ref 11.5–15.5)
MCHC RBC-ENTMCNC: 31.1 PG — SIGNIFICANT CHANGE UP (ref 27–34)
MCHC RBC-ENTMCNC: 33.1 GM/DL — SIGNIFICANT CHANGE UP (ref 32–36)
MCV RBC AUTO: 94.1 FL — SIGNIFICANT CHANGE UP (ref 80–100)
PLATELET # BLD AUTO: 226 K/UL — SIGNIFICANT CHANGE UP (ref 150–400)
POTASSIUM SERPL-MCNC: 4.1 MMOL/L — SIGNIFICANT CHANGE UP (ref 3.5–5.3)
POTASSIUM SERPL-SCNC: 4.1 MMOL/L — SIGNIFICANT CHANGE UP (ref 3.5–5.3)
RBC # BLD: 3.76 M/UL — LOW (ref 3.8–5.2)
RBC # FLD: 14.1 % — SIGNIFICANT CHANGE UP (ref 10.3–14.5)
SODIUM SERPL-SCNC: 136 MMOL/L — SIGNIFICANT CHANGE UP (ref 135–145)
WBC # BLD: 13.8 K/UL — HIGH (ref 3.8–10.5)
WBC # FLD AUTO: 13.8 K/UL — HIGH (ref 3.8–10.5)

## 2022-07-31 PROCEDURE — 99232 SBSQ HOSP IP/OBS MODERATE 35: CPT

## 2022-07-31 PROCEDURE — 99231 SBSQ HOSP IP/OBS SF/LOW 25: CPT

## 2022-07-31 RX ADMIN — ALBUTEROL 2 PUFF(S): 90 AEROSOL, METERED ORAL at 09:27

## 2022-07-31 RX ADMIN — CELECOXIB 200 MILLIGRAM(S): 200 CAPSULE ORAL at 09:32

## 2022-07-31 RX ADMIN — Medication 500 MILLIGRAM(S): at 22:42

## 2022-07-31 RX ADMIN — Medication 975 MILLIGRAM(S): at 05:46

## 2022-07-31 RX ADMIN — Medication 975 MILLIGRAM(S): at 23:25

## 2022-07-31 RX ADMIN — Medication 300 MILLIGRAM(S): at 09:31

## 2022-07-31 RX ADMIN — ATORVASTATIN CALCIUM 40 MILLIGRAM(S): 80 TABLET, FILM COATED ORAL at 22:42

## 2022-07-31 RX ADMIN — TIOTROPIUM BROMIDE 1 CAPSULE(S): 18 CAPSULE ORAL; RESPIRATORY (INHALATION) at 09:12

## 2022-07-31 RX ADMIN — Medication 40 MILLIGRAM(S): at 12:08

## 2022-07-31 RX ADMIN — LISINOPRIL 20 MILLIGRAM(S): 2.5 TABLET ORAL at 09:32

## 2022-07-31 RX ADMIN — Medication 500 MILLIGRAM(S): at 09:32

## 2022-07-31 RX ADMIN — Medication 100 MILLIGRAM(S): at 23:25

## 2022-07-31 RX ADMIN — BUDESONIDE AND FORMOTEROL FUMARATE DIHYDRATE 2 PUFF(S): 160; 4.5 AEROSOL RESPIRATORY (INHALATION) at 09:12

## 2022-07-31 RX ADMIN — LISINOPRIL 20 MILLIGRAM(S): 2.5 TABLET ORAL at 22:43

## 2022-07-31 RX ADMIN — APIXABAN 5 MILLIGRAM(S): 2.5 TABLET, FILM COATED ORAL at 22:42

## 2022-07-31 RX ADMIN — APIXABAN 5 MILLIGRAM(S): 2.5 TABLET, FILM COATED ORAL at 09:31

## 2022-07-31 RX ADMIN — BUDESONIDE AND FORMOTEROL FUMARATE DIHYDRATE 2 PUFF(S): 160; 4.5 AEROSOL RESPIRATORY (INHALATION) at 20:18

## 2022-07-31 RX ADMIN — Medication 975 MILLIGRAM(S): at 15:40

## 2022-07-31 RX ADMIN — Medication 1 TABLET(S): at 09:32

## 2022-07-31 RX ADMIN — PANTOPRAZOLE SODIUM 40 MILLIGRAM(S): 20 TABLET, DELAYED RELEASE ORAL at 05:46

## 2022-07-31 RX ADMIN — Medication 25 MILLIGRAM(S): at 09:32

## 2022-07-31 RX ADMIN — Medication 1 MILLIGRAM(S): at 09:32

## 2022-07-31 RX ADMIN — Medication 325 MILLIGRAM(S): at 09:32

## 2022-07-31 RX ADMIN — Medication 25 MILLIGRAM(S): at 22:42

## 2022-07-31 RX ADMIN — Medication 40 MILLIGRAM(S): at 23:25

## 2022-07-31 RX ADMIN — Medication 100 MILLIGRAM(S): at 06:28

## 2022-07-31 RX ADMIN — Medication 60 MILLIGRAM(S): at 22:42

## 2022-07-31 NOTE — DISCHARGE NOTE NURSING/CASE MANAGEMENT/SOCIAL WORK - PATIENT PORTAL LINK FT
You can access the FollowMyHealth Patient Portal offered by [facility] by registering at the following website: [email or web address]. By joining [facility]’s FollowMyHealth portal, you will also be able to view your health information using other applications (apps) compatible with our system.

## 2022-08-01 VITALS
OXYGEN SATURATION: 95 % | DIASTOLIC BLOOD PRESSURE: 62 MMHG | SYSTOLIC BLOOD PRESSURE: 153 MMHG | HEART RATE: 122 BPM | TEMPERATURE: 98 F

## 2022-08-01 LAB
ANION GAP SERPL CALC-SCNC: 9 MMOL/L — SIGNIFICANT CHANGE UP (ref 5–17)
BUN SERPL-MCNC: 26 MG/DL — HIGH (ref 7–23)
CALCIUM SERPL-MCNC: 9.5 MG/DL — SIGNIFICANT CHANGE UP (ref 8.5–10.1)
CHLORIDE SERPL-SCNC: 108 MMOL/L — SIGNIFICANT CHANGE UP (ref 96–108)
CO2 SERPL-SCNC: 22 MMOL/L — SIGNIFICANT CHANGE UP (ref 22–31)
CREAT SERPL-MCNC: 1.15 MG/DL — SIGNIFICANT CHANGE UP (ref 0.5–1.3)
EGFR: 49 ML/MIN/1.73M2 — LOW
GLUCOSE SERPL-MCNC: 157 MG/DL — HIGH (ref 70–99)
HCT VFR BLD CALC: 35.2 % — SIGNIFICANT CHANGE UP (ref 34.5–45)
HGB BLD-MCNC: 12 G/DL — SIGNIFICANT CHANGE UP (ref 11.5–15.5)
MCHC RBC-ENTMCNC: 31.1 PG — SIGNIFICANT CHANGE UP (ref 27–34)
MCHC RBC-ENTMCNC: 34.1 GM/DL — SIGNIFICANT CHANGE UP (ref 32–36)
MCV RBC AUTO: 91.2 FL — SIGNIFICANT CHANGE UP (ref 80–100)
PLATELET # BLD AUTO: 274 K/UL — SIGNIFICANT CHANGE UP (ref 150–400)
POTASSIUM SERPL-MCNC: 4 MMOL/L — SIGNIFICANT CHANGE UP (ref 3.5–5.3)
POTASSIUM SERPL-SCNC: 4 MMOL/L — SIGNIFICANT CHANGE UP (ref 3.5–5.3)
RBC # BLD: 3.86 M/UL — SIGNIFICANT CHANGE UP (ref 3.8–5.2)
RBC # FLD: 13.9 % — SIGNIFICANT CHANGE UP (ref 10.3–14.5)
SODIUM SERPL-SCNC: 139 MMOL/L — SIGNIFICANT CHANGE UP (ref 135–145)
WBC # BLD: 15.41 K/UL — HIGH (ref 3.8–10.5)
WBC # FLD AUTO: 15.41 K/UL — HIGH (ref 3.8–10.5)

## 2022-08-01 PROCEDURE — 99231 SBSQ HOSP IP/OBS SF/LOW 25: CPT

## 2022-08-01 PROCEDURE — 99232 SBSQ HOSP IP/OBS MODERATE 35: CPT

## 2022-08-01 RX ADMIN — Medication 1 TABLET(S): at 10:28

## 2022-08-01 RX ADMIN — TIOTROPIUM BROMIDE 1 CAPSULE(S): 18 CAPSULE ORAL; RESPIRATORY (INHALATION) at 09:21

## 2022-08-01 RX ADMIN — LISINOPRIL 20 MILLIGRAM(S): 2.5 TABLET ORAL at 09:39

## 2022-08-01 RX ADMIN — Medication 25 MILLIGRAM(S): at 09:39

## 2022-08-01 RX ADMIN — ALBUTEROL 2 PUFF(S): 90 AEROSOL, METERED ORAL at 09:21

## 2022-08-01 RX ADMIN — Medication 1 MILLIGRAM(S): at 10:28

## 2022-08-01 RX ADMIN — Medication 975 MILLIGRAM(S): at 06:33

## 2022-08-01 RX ADMIN — APIXABAN 5 MILLIGRAM(S): 2.5 TABLET, FILM COATED ORAL at 09:39

## 2022-08-01 RX ADMIN — Medication 300 MILLIGRAM(S): at 09:39

## 2022-08-01 RX ADMIN — Medication 100 MILLIGRAM(S): at 06:33

## 2022-08-01 RX ADMIN — Medication 40 MILLIGRAM(S): at 09:39

## 2022-08-01 RX ADMIN — Medication 20 MILLIGRAM(S): at 12:51

## 2022-08-01 RX ADMIN — PANTOPRAZOLE SODIUM 40 MILLIGRAM(S): 20 TABLET, DELAYED RELEASE ORAL at 06:33

## 2022-08-01 RX ADMIN — Medication 500 MILLIGRAM(S): at 10:28

## 2022-08-01 RX ADMIN — BUDESONIDE AND FORMOTEROL FUMARATE DIHYDRATE 2 PUFF(S): 160; 4.5 AEROSOL RESPIRATORY (INHALATION) at 09:21

## 2022-08-01 NOTE — PROGRESS NOTE ADULT - PROVIDER SPECIALTY LIST ADULT
Hospitalist
Anticoag Management
Hospitalist
Hospitalist
Orthopedics
Orthopedics
Anticoag Management
Orthopedics
Orthopedics
Anticoag Management

## 2022-08-01 NOTE — PROGRESS NOTE ADULT - ASSESSMENT
This is a 77 year old female with hx of A. Fib on Eliquis 5mg [id number] Score: 4, osteoarthritis, presented to [facility] for planned left tkr. Pt has high thrombotic risk and require anticoagulation treatment dose.         Plan:  Cont Eliquis 5mg po twice a day  Daily CBC/BMP  LE Venodyne  :increase mobility as per ortho  : will f/u  Dispo: Home
This is a 77 year old female with hx of A. Fib on Eliquis 5mg [id number] Score: 4, osteoarthritis, presented to [facility] for planned left tkr. Pt has high thrombotic risk and require anticoagulation treatment dose.         Plan:  Cont Eliquis 5mg po twice a day at 22:00  Daily CBC/BMP  LE Venodyne  :increase mobility as per ortho  : will f/u  Dispo: Home
This is a 77 year old female with hx of A. Fib on Eliquis 5mg [id number] Score: 4, osteoarthritis, presented to [facility] for planned left tkr. Pt has high thrombotic risk and require anticoagulation treatment dose.         Plan:  Cont Eliquis 5mg po twice a day  Repeat CBC/BMP as a home draw.   LE Venodyne  :increase mobility as per ortho  : will f/u  Dispo: Home today

## 2022-08-01 NOTE — PROGRESS NOTE ADULT - SUBJECTIVE AND OBJECTIVE BOX
HPI: This is a 77 year old female diagnosed with OA left knee c/o left knee pain; presents to [facility] for planned left TKR.        Patient is a 77y old  Female who presents with a chief complaint of LEFT KNEE PAIN.     Consulted by Dr. Jerson Mast for VTE prophylaxis, risk stratification, and anticoagulation management.    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      Afib  on Eliquis      Malignant neoplasm of left female breast, unspecified estrogen receptor status, unspecified site of breast   , h/o chemo      Hyperlipidemia, unspecified hyperlipidemia type      COPD with emphysema  1 or less exacerbations/year      Hypertension      Borderline type 2 diabetes mellitus      Former smoker      Erythrocytosis  Chronic, compensatory - mild      Osteoarthritis  both knees L&gt;R      Left knee pain      History of ETOH abuse  last drink       On home oxygen therapy  2L as needed      Melanoma  back      Pneumonitis      Fatty liver      Anxiety      Thyroid nodule      Epistaxis  2022      H/O mastectomy, left   axillary node dissection      Artificial pacemaker  [employer]tronic Model [id number]  initially 2009,  generator change 2020 ,      History of arthroscopy of left knee        History of appendectomy  2018      Pacemaker lead fracture  replace atrial only 2021      History of tonsillectomy          FAMILY HISTORY:  FH: pancreatic cancer (Father)        Interval Note:  2022 Pt seen in PACU at bedside. DISCUSSED WITH PT RESUMING HER ELIQUIS tonight.  Pt has o concerns s/p left tkr.   2022 Pt seen at bedside on [facility].  Discussed her resuming her Eliquis  No concerns.  Pt states she wants to go home today depending on how she does with physical therapy.   2022 Pt eula t bedside on [facility].  Discussed her eliquis no concerns.  States she is still in hospital due to her COPD AND EMPHYSEMA NEED SOME PREDNISONE.  Home tomorrow if she is stable.    2022 Pt seen at [facility] oob ambulating to bathroom  States she is going home today.  Discussed her Eliquis no concerns states sh will be going home on prednisone.     CAPRINI SCORE  AGE RELATED RISK FACTORS                                                       MOBILITY RELATED FACTORS  [ ] Age 41-60 years                                            (1 Point)                  [ ] Bed rest                                                        (1 Point)  [ ] Age: 61-74 years                                           (2 Points)                [ ] Plaster cast                                                   (2 Points)  [x ] Age= 75 years                                              (3 Points)                 [ ] Bed bound for more than 72 hours                   (2 Points)    DISEASE RELATED RISK FACTORS                                               GENDER SPECIFIC FACTORS  [ ] Edema in the lower extremities                       (1 Point)           [ ] Pregnancy                                                            (1 Point)  [ ] Varicose veins                                               (1 Point)                  [ ] Post-partum < 6 weeks                                      (1 Point)             [x ] BMI > 25 Kg/m2                                            (1 Point)                  [ ] Hormonal therapy or oral contraception       (1 Point)                 [ ] Sepsis (in the previous month)                        (1 Point)             [ ] History of pregnancy complications                (1Point)  [ ] Pneumonia or serious lung disease                                             [ ] Unexplained or recurrent  (=/>3), premature                                 (In the previous month)                               (1 Point)                birth with toxemia or growth-restricted infant (1 Point)  [ ] Abnormal pulmonary function test            (1 Point)                                   SURGERY RELATED RISK FACTORS  [ ] Acute myocardial infarction                       (1 Point)                  [ ]  Section                                         (1 Point)  [ ] Congestive heart failure (in the previous month) (1 Point)   [ ] Minor surgery   lasting <45 minutes       (1 Point)   [ ] Inflammatory bowel disease                             (1 Point)          [ ] Arthroscopic surgery                                  (2 Points)  [ ] Central venous access                                    (2 Points)            [ ] General surgery lasting >45 minutes      (2 Points)       [ ] Stroke (in the previous month)                  (5 Points)            [x ] Elective major lower extremity arthroplasty (5 Points)                                   [ x ] Malignancy (present or past include skin melanoma                                          but exclude  basal skin cell)    (2 points)                                      TRAUMA RELATED RISK FACTORS                HEMATOLOGY RELATED FACTORS                                  [ ] Fracture of the hip, pelvis, or leg                       (5 Points)  [ ] Prior episodes of VTE                                     (3 Points)          [ ] Acute spinal cord injury (in the previous month)  (5 Points)  [ ] Positive family history for VTE                         (3 Points)       [ ] Paralysis (less than 1 month)                          (5 Points)  [ ] Prothrombin 28914 A                                      (3 Points)         [ ] Multiple Trauma (within 1month)                 (5Points)                                                                                                                                                                [ ] Factor V Leiden                                          (3 Points)                                OTHER RISK FACTORS                          [ ] Lupus anticoagulants                                     (3 Points)                       [ ] BMI > 40                          (1 Point)                                                         [ ] Anticardiolipin antibodies                                (3 Points)                   [ ] Smoking                              (1Point)                                                [ ] High homocysteine in the blood                      (3 Points)                [  ] Diabetes requiring insulin (1point)                         [ ] Other congenital or acquired thrombophilia       (3 Points)          [  ] Chemotherapy                   (1 Point)  [ ] Heparin induced thrombocytopenia                  (3 Points)             [  ] Blood Transfusion                (1 point)                                                                                                             Total Score [ 11 ]                                                                                                                   [id number] Score: 4     IMPROVE Bleeding Risk Score:    Falls Risk:   High (  )  Mod (  x)  Low (  )  crcl:  39.8       cr: 1.33         BMI 28.4                    Denies any personal or familial history of clotting or bleeding disorders.    Allergies    No Known Allergies    Intolerances    Mobic (Other)      REVIEW OF SYSTEMS    (  )Fever	     (  )Constipation	(  )SOB				(  )Headache	(  )Dysuria  (  )Chills	     (  )Melena	(  )Dyspnea present on exertion	                    (  )Dizziness                    (  )Polyuria  (  )Nausea	     (  )Hematochezia	(  )Cough			                    (  )Syncope   	(  )Hematuria  (  )Vomiting    (  )Chest Pain	(  )Wheezing			(  )Weakness  (X) KNEE PAIN  (  )Diarrhea     (  )Palpitations	(  )Anorexia			(  )Myalgia    Pertinent positives in HPI and daily subjective.  All other ROS negative.    Vital Signs Last 24 Hrs  T(C): 36.8 (22 @ 08:25), Max: 36.8 (22 @ 20:15)  T(F): 98.2 (22 @ 08:25), Max: 98.2 (22 @ 20:15)  HR: 112 (22 @ 09:24) (84 - 122)  BP: 153/62 (22 @ 08:25) (105/51 - 153/62)  BP(mean): 79 (22 @ 20:15) (79 - 79)  RR: 18 (22 @ 00:35) (18 - 18)  SpO2: 95% (22 @ 08:25) (89% - 95%)    PHYSICAL EXAM:    Constitutional: Appears Well    Neurological: A& O x 3    Skin: Warm    Respiratory and Thorax: normal effort; Breath sounds: normal; +wheezing noted bl  	  Cardiovascular: S1, S2, regular, NMBR	    Gastrointestinal: BS + x 4Q, nontender	    Genitourinary:  Bladder nondistended, nontender    Musculoskeletal:   General Right:   no muscle/joint tenderness,   normal tone, no joint swelling,   ROM: full	    General Left:   no muscle/joint tenderness,   normal tone, no joint swelling,   ROM: full      Lower extrems:   Right: no calf tenderness              negative julián's sign               + pedal pulses    Left:   no calf tenderness              negative julián's sign               + pedal pulses    Knee rt: dsg CDI        DVT Prophylaxis:  LMWH                   (  )  Heparin SQ           (  )  Coumadin             (  )  Xarelto                  (  )  Eliquis                   (X  )  Venodynes           (X  )  Ambulation          (x  )  UFH                       (  )  Contraindicated  (  )  EC Aspirin             (  )        
CC- LT TKR    HPI:  78yo/F with PMH afib on Eliquis, HTN, hyperlipidemia, COPD, tachy-maryam s/p PPM,  breast ca s/p LT mastectomy, melanoma back, OA presented for elective LT TKR. Patient had pain in her LT knee affecting ambulation and daily activities, she failed outpatient treatment and scheduled for TKR. Medical consult called for postop medical management  Hospital course notable for acute hypoxic resp failure/acute copd exacerbation.     pt seen and examined this am. Feeling much better. wheezing improved. no sob at rest. Knee pain under control. no difficulty voiding. Tolerating po.    Review of system- All 10 systems reviewed and is as per HPI otherwise negative.     Vital Signs Last 24 Hrs  T(C): 36.8 (01 Aug 2022 08:25), Max: 36.8 (31 Jul 2022 20:15)  T(F): 98.2 (01 Aug 2022 08:25), Max: 98.2 (31 Jul 2022 20:15)  HR: 112 (01 Aug 2022 09:24) (84 - 122)  BP: 153/62 (01 Aug 2022 08:25) (105/51 - 153/62)  BP(mean): 79 (31 Jul 2022 20:15) (79 - 79)  RR: 18 (01 Aug 2022 00:35) (18 - 18)  SpO2: 95% (01 Aug 2022 08:25) (89% - 95%)    Parameters below as of 01 Aug 2022 08:25  Patient On (Oxygen Delivery Method): nasal cannula      PHYSICAL EXAM:    GENERAL: Comfortable, no acute distress   HEAD:  Normocephalic, atraumatic  EYES: EOMI, PERRLA  HEENT: Moist mucous membranes  NECK: Supple, No JVD  NERVOUS SYSTEM:  Alert & Oriented X3, Motor Strength 5/5 B/L upper and lower extremities  CHEST/LUNG: cta bilaterally  HEART: iirregularly irregular.  ABDOMEN: Soft, Nontender, Nondistended, Bowel sounds present  GENITOURINARY: Voiding, no palpable bladder  EXTREMITIES:   No clubbing, cyanosis, or edema  MUSCULOSKELTAL- Left knee dressing c/d/i  SKIN-no rash      LABS:                        12.0   15.41 )-----------( 274      ( 01 Aug 2022 07:56 )             35.2     08-01    139  |  108  |  26<H>  ----------------------------<  157<H>  4.0   |  22  |  1.15    Ca    9.5      01 Aug 2022 07:56        MEDICATIONS  (STANDING):  acetaminophen     Tablet .. 975 milliGRAM(s) Oral every 8 hours  apixaban 5 milliGRAM(s) Oral two times a day  ascorbic acid 500 milliGRAM(s) Oral two times a day  atorvastatin 40 milliGRAM(s) Oral at bedtime  budesonide  80 MICROgram(s)/formoterol 4.5 MICROgram(s) Inhaler 2 Puff(s) Inhalation two times a day  celecoxib 200 milliGRAM(s) Oral every 12 hours  diltiazem    Tablet 60 milliGRAM(s) Oral <User Schedule>  diltiazem    milliGRAM(s) Oral daily  ferrous    sulfate 325 milliGRAM(s) Oral daily  folic acid 1 milliGRAM(s) Oral daily  lactated ringers. 1000 milliLiter(s) (75 mL/Hr) IV Continuous <Continuous>  lisinopril 20 milliGRAM(s) Oral two times a day  metoprolol succinate ER 25 milliGRAM(s) Oral two times a day  multivitamin 1 Tablet(s) Oral daily  pantoprazole    Tablet 40 milliGRAM(s) Oral before breakfast  polyethylene glycol 3350 17 Gram(s) Oral at bedtime  senna 2 Tablet(s) Oral at bedtime  tiotropium 18 MICROgram(s) Capsule 1 Capsule(s) Inhalation daily    MEDICATIONS  (PRN):  ALBUTerol    90 MICROgram(s) HFA Inhaler 2 Puff(s) Inhalation every 6 hours PRN for shortness of breath and/or wheezing  bisacodyl Suppository 10 milliGRAM(s) Rectal once PRN Constipation  guaiFENesin Oral Liquid (Sugar-Free) 100 milliGRAM(s) Oral every 6 hours PRN Cough  hydrochlorothiazide 25 milliGRAM(s) Oral daily PRN htn  HYDROmorphone  Injectable 0.5 milliGRAM(s) SubCutaneous every 4 hours PRN Severe Pain (7 - 10)  ondansetron Injectable 8 milliGRAM(s) IV Push every 8 hours PRN Nausea and/or Vomiting  oxyCODONE    IR 5 milliGRAM(s) Oral every 4 hours PRN Mild Pain (1 - 3)  oxyCODONE    IR 10 milliGRAM(s) Oral every 4 hours PRN Moderate Pain (4 - 6)  prochlorperazine   Injectable 5 milliGRAM(s) IV Push every 8 hours PRN Nausea and/or Vomiting        Assessment/Plan  #OA left knee s/p left knee replacement:  -POD#3  -pain control  -dc celebrex as pt on eliquis.   -physical therapy  -incentive spirometry  -bowel regimen.     #Acute copd exacerbation with acute hypoxic respiratory failure:  -change iv solumedrol to po prednisone.   -prn o2   -can dc on prednisone taper (sent script to pharmacy)  -pt to f/u with Dr. Justin as outpt.    #Afib/ s/p PPM  -continue cardizem, bb with hold parameters.    -on eliquis.     #HTN  -off hctz d/t dehydration/manny.   -encourage po fluids.   -continue bb/ccb and ace-i with hold parameters.     #DVt proph  - eliquis    Dispo  home today  
Orthopedics Post-op Check    Patient seen and examined at bedside. Pain is controlled. Patient is feeling well and denies any CP, SOB, nausea or vomiting.    LABS:                        13.3   11.04 )-----------( 257      ( 30 Jul 2022 07:52 )             39.5     07-30    140  |  110<H>  |  17  ----------------------------<  125<H>  4.2   |  25  |  1.14    Ca    8.8      30 Jul 2022 07:52            VITAL SIGNS:  T(C): 36.7 (07-31-22 @ 05:00), Max: 37.4 (07-30-22 @ 20:14)  HR: 69 (07-31-22 @ 05:00) (69 - 76)  BP: 101/44 (07-31-22 @ 05:00) (101/44 - 120/85)  RR: 18 (07-31-22 @ 05:00) (18 - 18)  SpO2: 96% (07-31-22 @ 05:00) (85% - 96%)    Physical Exam:   Gen: NAD, resting comfortably, on oxygen therapy  LLE:   Dressing c/d/i  +EHL/FHL/TA/GS  SILT L2-S1  Compartments soft and compressible  DP/PT pulses palpable  No calf TTP bilaterally          A/P:  77yFemale Stable POD2 s/p L TKA    Patient wants to head home today, will discuss with case management and plan for DC pending O2 requirements  Follow up postoperative labs  WBAT LLE with assistive devices as needed.   Pain management PRN  PT/OT  DVT PPx, AC Recs Appreciated  Incentive spirometry  Will discuss plan with Dr. Mast and change accordingly. 
Orthopedics Post-op Check  POD 0  Patient seen and examined at bedside. Pain is controlled. Patient is feeling well and denies any nausea or vomiting.    Vital Signs:   T(C): 36.5 (07-29-22 @ 18:05), Max: 36.8 (07-29-22 @ 08:26)  T(F): 97.7 (07-29-22 @ 18:05), Max: 98.3 (07-29-22 @ 08:26)  HR: 60 (07-29-22 @ 18:05) (50 - 73)  BP: 125/62 (07-29-22 @ 18:05) (88/48 - 141/71)  RR: 18 (07-29-22 @ 18:05) (13 - 24)  SpO2: 98% (07-29-22 @ 18:05) (95% - 100%)    Physical Exam:   Gen: NAD, resting comfortably, on oxygen therapy  LLE:   Dressing c/d/i  +EHL/FHL/TA/GS  SILT L2-S1, endorses partial numbness below the knee   Compartments soft and compressible  DP/PT pulses palpable  No calf TTP bilaterally                          13.7   7.02  )-----------( 291      ( 29 Jul 2022 12:45 )             41.0     29 Jul 2022 12:45    141    |  110    |  23     ----------------------------<  106    3.7     |  25     |  1.33     Ca    9.1        29 Jul 2022 12:45      A/P:  77yFemale Stable POD 0  s/p L TKA    Follow up postoperative labs  WBAT LLE with assistive devices as needed.   Pain management PRN  PT/OT  Continue PPx antibiotics  DVT PPx: hold until POD1, AC Recs Appreciated  Incentive spirometry  Will discuss plan with Dr. Mast and change accordingly. 
CC- LT TKR    HPI:  78yo/F with PMH afib on Eliquis, HTN, hyperlipidemia, COPD, tachy-maryam s/p PPM,  breast ca s/p LT mastectomy, melanoma back, OA presented for elective LT TKR. Patient had pain in her LT knee affecting ambulation and daily activities, she failed outpatient treatment and scheduled for TKR. Medical consult called for postop medical management    pt seen and examined this am. Was coughing and wheezing. Felt a little better after getting inhalers. Hadn't ambulated yet with physical therapy. Has home o2 although hasn't used it in a long time.     Review of system- All 10 systems reviewed and is as per HPI otherwise negative.     Vital Signs Last 24 Hrs  T(C): 36.7 (31 Jul 2022 08:35), Max: 37.4 (30 Jul 2022 20:14)  T(F): 98 (31 Jul 2022 08:35), Max: 99.3 (30 Jul 2022 20:14)  HR: 77 (31 Jul 2022 09:30) (69 - 77)  BP: 125/58 (31 Jul 2022 09:30) (96/52 - 125/58)  RR: 17 (31 Jul 2022 09:30) (17 - 18)  SpO2: 93% (31 Jul 2022 09:30) (85% - 96%)    Parameters below as of 31 Jul 2022 09:30  Patient On (Oxygen Delivery Method): room air    PHYSICAL EXAM:    GENERAL: Comfortable, no acute distress   HEAD:  Normocephalic, atraumatic  EYES: EOMI, PERRLA  HEENT: Moist mucous membranes  NECK: Supple, No JVD  NERVOUS SYSTEM:  Alert & Oriented X3, Motor Strength 5/5 B/L upper and lower extremities  CHEST/LUNG: bilateral expiratory wheeze  HEART: Regular rate and rhythm  ABDOMEN: Soft, Nontender, Nondistended, Bowel sounds present  GENITOURINARY: Voiding, no palpable bladder  EXTREMITIES:   No clubbing, cyanosis, or edema  MUSCULOSKELTAL- Left knee dressing c/d/i  SKIN-no rash        LABS:                        11.7   13.80 )-----------( 226      ( 31 Jul 2022 08:27 )             35.4     07-31    136  |  104  |  25<H>  ----------------------------<  102<H>  4.1   |  26  |  1.54<H>    Ca    8.8      31 Jul 2022 08:27        MEDICATIONS  (STANDING):  acetaminophen     Tablet .. 975 milliGRAM(s) Oral every 8 hours  apixaban 5 milliGRAM(s) Oral two times a day  ascorbic acid 500 milliGRAM(s) Oral two times a day  atorvastatin 40 milliGRAM(s) Oral at bedtime  budesonide  80 MICROgram(s)/formoterol 4.5 MICROgram(s) Inhaler 2 Puff(s) Inhalation two times a day  celecoxib 200 milliGRAM(s) Oral every 12 hours  diltiazem    Tablet 60 milliGRAM(s) Oral <User Schedule>  diltiazem    milliGRAM(s) Oral daily  ferrous    sulfate 325 milliGRAM(s) Oral daily  folic acid 1 milliGRAM(s) Oral daily  lactated ringers. 1000 milliLiter(s) (75 mL/Hr) IV Continuous <Continuous>  lisinopril 20 milliGRAM(s) Oral two times a day  metoprolol succinate ER 25 milliGRAM(s) Oral two times a day  multivitamin 1 Tablet(s) Oral daily  pantoprazole    Tablet 40 milliGRAM(s) Oral before breakfast  polyethylene glycol 3350 17 Gram(s) Oral at bedtime  senna 2 Tablet(s) Oral at bedtime  tiotropium 18 MICROgram(s) Capsule 1 Capsule(s) Inhalation daily    MEDICATIONS  (PRN):  ALBUTerol    90 MICROgram(s) HFA Inhaler 2 Puff(s) Inhalation every 6 hours PRN for shortness of breath and/or wheezing  bisacodyl Suppository 10 milliGRAM(s) Rectal once PRN Constipation  guaiFENesin Oral Liquid (Sugar-Free) 100 milliGRAM(s) Oral every 6 hours PRN Cough  hydrochlorothiazide 25 milliGRAM(s) Oral daily PRN htn  HYDROmorphone  Injectable 0.5 milliGRAM(s) SubCutaneous every 4 hours PRN Severe Pain (7 - 10)  ondansetron Injectable 8 milliGRAM(s) IV Push every 8 hours PRN Nausea and/or Vomiting  oxyCODONE    IR 5 milliGRAM(s) Oral every 4 hours PRN Mild Pain (1 - 3)  oxyCODONE    IR 10 milliGRAM(s) Oral every 4 hours PRN Moderate Pain (4 - 6)  prochlorperazine   Injectable 5 milliGRAM(s) IV Push every 8 hours PRN Nausea and/or Vomiting        Assessment/Plan  #OA left knee s/p left knee replacement:  -POD#2  -pain control  -dc celebrex as pt on eliquis.   -physical therapy  -incentive spirometry  -bowel regimen.     #Acute copd exacerbation with acute hypoxic respiratory failure:  -start steroids.   -continue inhalers.   -o2 via nasal cannula to keep spo2 90-94%    #Afib/ s/p PPM  -continue cardizem, bb with hold parameters.    -on eliquis.     #HTN  -dc hctz d/t dehydration/hypovolemia/acute renail failure  -encourage po fluids.   -continue bb/ccb and ace-i with hold parameters.     #DVt proph  - eliquis      
CC- LT TKR    HPI:  78yo/F with PMH afib on Eliquis, HTN, hyperlipidemia, COPD, tachy-mayram s/p PPM,  breast ca s/p LT mastectomy, melanoma back, OA presented for elective LT TKR. Patient had pain in her LT knee affecting ambulation and daily activities, she failed outpatient treatment and scheduled for TKR. Medical consult called for postop medical management    PMH- as above  PSH- LT mastectomy, appendectomy, tonsillectomy  Soc hx- ex-alcoholic, ex-smoker quit  Fam hx- f pancreatic ca,    7/29/22- seen postop in PACU, spinal wearing off, sensation not completely back yet  7/30/22- was up with PT earlier, did good.     Review of system- All 10 systems reviewed and is as per HPI otherwise negative.     Vital Signs Last 24 Hrs  T(C): 36.6 (30 Jul 2022 08:37), Max: 36.6 (30 Jul 2022 08:37)  T(F): 97.8 (30 Jul 2022 08:37), Max: 97.8 (30 Jul 2022 08:37)  HR: 74 (30 Jul 2022 08:37) (59 - 74)  BP: 120/85 (30 Jul 2022 08:37) (90/48 - 139/67)  BP(mean): --  RR: 18 (30 Jul 2022 08:37) (13 - 20)  SpO2: 96% (30 Jul 2022 08:37) (92% - 99%)    Parameters below as of 30 Jul 2022 08:37  Patient On (Oxygen Delivery Method): mask, Venturi    LABS:                        13.3   11.04 )-----------( 257      ( 30 Jul 2022 07:52 )             39.5     30 Jul 2022 07:52    140    |  110    |  17     ----------------------------<  125    4.2     |  25     |  1.14     Ca    8.8        30 Jul 2022 07:52    CAPILLARY BLOOD GLUCOSE  POCT Blood Glucose.: 148 mg/dL (30 Jul 2022 10:29)  POCT Blood Glucose.: 168 mg/dL (29 Jul 2022 22:55)    RADIOLOGY & ADDITIONAL TESTS:      PHYSICAL EXAM:  GENERAL: NAD, well-groomed, well-developed  HEAD:  Atraumatic, Normocephalic  EYES: EOMI, PERRLA, conjunctiva and sclera clear  HEENT: Moist mucous membranes  NECK: Supple, No JVD  NERVOUS SYSTEM:  Alert & Oriented X3, Motor Strength 5/5 B/L upper and lower extremities; DTRs 2+ intact and symmetric  CHEST/LUNG: Clear to auscultation bilaterally; No rales, rhonchi, wheezing, or rubs  HEART: Regular rate and rhythm; No murmurs, rubs, or gallops  ABDOMEN: Soft, Nontender, Nondistended; Bowel sounds present  GENITOURINARY- Voiding, no palpable bladder  EXTREMITIES:  2+ Peripheral Pulses, No clubbing, cyanosis, or edema  MUSCULOSKELTAL- LT knee dressing dry  SKIN-no rash, no lesion  CNS- alert, oriented X3, non focal       Daily Height in cm: 160.02 (29 Jul 2022 08:26)      MEDICATIONS  (STANDING):  acetaminophen     Tablet .. 975 milliGRAM(s) Oral every 8 hours  apixaban 5 milliGRAM(s) Oral two times a day  ascorbic acid 500 milliGRAM(s) Oral two times a day  atorvastatin 40 milliGRAM(s) Oral at bedtime  budesonide  80 MICROgram(s)/formoterol 4.5 MICROgram(s) Inhaler 2 Puff(s) Inhalation two times a day  celecoxib 200 milliGRAM(s) Oral every 12 hours  diltiazem    Tablet 60 milliGRAM(s) Oral <User Schedule>  diltiazem    milliGRAM(s) Oral daily  ferrous    sulfate 325 milliGRAM(s) Oral daily  folic acid 1 milliGRAM(s) Oral daily  lactated ringers. 1000 milliLiter(s) (75 mL/Hr) IV Continuous <Continuous>  lisinopril 20 milliGRAM(s) Oral two times a day  metoprolol succinate ER 25 milliGRAM(s) Oral two times a day  multivitamin 1 Tablet(s) Oral daily  pantoprazole    Tablet 40 milliGRAM(s) Oral before breakfast  polyethylene glycol 3350 17 Gram(s) Oral at bedtime  senna 2 Tablet(s) Oral at bedtime  tiotropium 18 MICROgram(s) Capsule 1 Capsule(s) Inhalation daily    MEDICATIONS  (PRN):  ALBUTerol    90 MICROgram(s) HFA Inhaler 2 Puff(s) Inhalation every 6 hours PRN for shortness of breath and/or wheezing  hydrochlorothiazide 25 milliGRAM(s) Oral daily PRN htn  HYDROmorphone  Injectable 0.5 milliGRAM(s) SubCutaneous every 4 hours PRN Severe Pain (7 - 10)  ondansetron Injectable 8 milliGRAM(s) IV Push every 8 hours PRN Nausea and/or Vomiting  oxyCODONE    IR 5 milliGRAM(s) Oral every 4 hours PRN Mild Pain (1 - 3)  oxyCODONE    IR 10 milliGRAM(s) Oral every 4 hours PRN Moderate Pain (4 - 6)  prochlorperazine   Injectable 5 milliGRAM(s) IV Push every 8 hours PRN Nausea and/or Vomiting    Assessment/Plan  #LT TKR  Ortho following  PT as tolerated  AC by eliquis  Monitor HH postop  Pain meds prn  Bowel regimen  Incentive spirometry    #Afib/ s/p PPM  Back on Eliquis    #COPD  Cont home inhalers    #HTN- BP stable    #DVt proph- eliquis    #Dispo-- continue to mobilize with PT, likely home with home PT tomorrow    
Orthopedics Post-op Check  POD 1  Patient seen and examined at bedside. Pain is controlled. Patient is feeling well and denies any CP, SOB, nausea or vomiting.    Vital Signs:   T(C): 36.5 (07-29-22 @ 18:05), Max: 36.8 (07-29-22 @ 08:26)  T(F): 97.7 (07-29-22 @ 18:05), Max: 98.3 (07-29-22 @ 08:26)  HR: 60 (07-29-22 @ 18:05) (50 - 73)  BP: 125/62 (07-29-22 @ 18:05) (88/48 - 141/71)  RR: 18 (07-29-22 @ 18:05) (13 - 24)  SpO2: 98% (07-29-22 @ 18:05) (95% - 100%)    Physical Exam:   Gen: NAD, resting comfortably, on oxygen therapy  LLE:   Dressing c/d/i  +EHL/FHL/TA/GS  SILT L2-S1  Compartments soft and compressible  DP/PT pulses palpable  No calf TTP bilaterally      Labs:                        13.7   7.02  )-----------( 291      ( 29 Jul 2022 12:45 )             41.0     29 Jul 2022 12:45    141    |  110    |  23     ----------------------------<  106    3.7     |  25     |  1.33     Ca    9.1        29 Jul 2022 12:45      A/P:  77yFemale Stable POD 1  s/p L TKA    Follow up postoperative labs  WBAT LLE with assistive devices as needed.   Pain management PRN  PT/OT  DVT PPx, AC Recs Appreciated  Incentive spirometry  Will discuss plan with Dr. Mast and change accordingly. 
Pt is a 77F POD3 L TKA. Pt was seen at bedside. Reports some shortness of breath due to her COPD, however pt is satting well on RA, and has no acute orthopaedic complaints at this time.    T(C): 36.8 (08-01-22 @ 08:25), Max: 36.8 (07-31-22 @ 20:15)  T(F): 98.2 (08-01-22 @ 08:25), Max: 98.2 (07-31-22 @ 20:15)  HR: 112 (08-01-22 @ 09:24) (84 - 122)  BP: 153/62 (08-01-22 @ 08:25) (105/51 - 153/62)  RR: 18 (08-01-22 @ 00:35) (18 - 18)  SpO2: 95% (08-01-22 @ 08:25) (89% - 95%)    Exam:    General:  Pt in NAD    LLE:    no gross deformity noted. dressings c/d/i    motor: EHL/FHL/TA/GSc  SILT: [place]quivel/Sa/SPN/DPN  2+ DP/PT  Compartments soft & compressible  Calf NTTP    A/P:  77yFemale Stable POD3 s/p L TKA    Patient wants to head home today, will discuss with case management and plan for DC pending O2 requirements  Follow up postoperative labs  WBAT LLE with assistive devices as needed.   Pain management PRN  PT/OT  DVT PPx, AC Recs Appreciated  Incentive spirometry  Will discuss plan with Dr. Mast and change accordingly. 
  HPI: This is a 77 year old female diagnosed with OA left knee c/o left knee pain; presents to [facility] for planned left TKR.        Patient is a 77y old  Female who presents with a chief complaint of LEFT KNEE PAIN.     Consulted by Dr. Jerson Mast for VTE prophylaxis, risk stratification, and anticoagulation management.    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      Afib  on Eliquis      Malignant neoplasm of left female breast, unspecified estrogen receptor status, unspecified site of breast   , h/o chemo      Hyperlipidemia, unspecified hyperlipidemia type      COPD with emphysema  1 or less exacerbations/year      Hypertension      Borderline type 2 diabetes mellitus      Former smoker      Erythrocytosis  Chronic, compensatory - mild      Osteoarthritis  both knees L&gt;R      Left knee pain      History of ETOH abuse  last drink       On home oxygen therapy  2L as needed      Melanoma  back      Pneumonitis      Fatty liver      Anxiety      Thyroid nodule      Epistaxis  2022      H/O mastectomy, left   axillary node dissection      Artificial pacemaker  Medtronic Model [id number]  initially 2009,  generator change 2020 ,      History of arthroscopy of left knee        History of appendectomy  2018      Pacemaker lead fracture  replace atrial only 2021      History of tonsillectomy          FAMILY HISTORY:  FH: pancreatic cancer (Father)        Interval Note:  2022 Pt seen in PACU at bedside. DISCUSSED WITH PT RESUMING HER ELIQUIS tonight.  Pt has o concerns s/p left tkr.   2022 Pt seen at bedside on [facility].  Discussed her resuming her Eliquis  No concerns.  Pt states she wants to go home today depending on how she does with physical therapy.   2022 Pt eula t bedside on [facility].  Discussed her eliquis no concerns.  States she is still in hospital due to her COPD AND EMPHYSEMA NEED SOME PREDNISONE.  Home tomorrow if she is stable.      CAPRINI SCORE  AGE RELATED RISK FACTORS                                                       MOBILITY RELATED FACTORS  [ ] Age 41-60 years                                            (1 Point)                  [ ] Bed rest                                                        (1 Point)  [ ] Age: 61-74 years                                           (2 Points)                [ ] Plaster cast                                                   (2 Points)  [x ] Age= 75 years                                              (3 Points)                 [ ] Bed bound for more than 72 hours                   (2 Points)    DISEASE RELATED RISK FACTORS                                               GENDER SPECIFIC FACTORS  [ ] Edema in the lower extremities                       (1 Point)           [ ] Pregnancy                                                            (1 Point)  [ ] Varicose veins                                               (1 Point)                  [ ] Post-partum < 6 weeks                                      (1 Point)             [x ] BMI > 25 Kg/m2                                            (1 Point)                  [ ] Hormonal therapy or oral contraception       (1 Point)                 [ ] Sepsis (in the previous month)                        (1 Point)             [ ] History of pregnancy complications                (1Point)  [ ] Pneumonia or serious lung disease                                             [ ] Unexplained or recurrent  (=/>3), premature                                 (In the previous month)                               (1 Point)                birth with toxemia or growth-restricted infant (1 Point)  [ ] Abnormal pulmonary function test            (1 Point)                                   SURGERY RELATED RISK FACTORS  [ ] Acute myocardial infarction                       (1 Point)                  [ ]  Section                                         (1 Point)  [ ] Congestive heart failure (in the previous month) (1 Point)   [ ] Minor surgery   lasting <45 minutes       (1 Point)   [ ] Inflammatory bowel disease                             (1 Point)          [ ] Arthroscopic surgery                                  (2 Points)  [ ] Central venous access                                    (2 Points)            [ ] General surgery lasting >45 minutes      (2 Points)       [ ] Stroke (in the previous month)                  (5 Points)            [x ] Elective major lower extremity arthroplasty (5 Points)                                   [ x ] Malignancy (present or past include skin melanoma                                          but exclude  basal skin cell)    (2 points)                                      TRAUMA RELATED RISK FACTORS                HEMATOLOGY RELATED FACTORS                                  [ ] Fracture of the hip, pelvis, or leg                       (5 Points)  [ ] Prior episodes of VTE                                     (3 Points)          [ ] Acute spinal cord injury (in the previous month)  (5 Points)  [ ] Positive family history for VTE                         (3 Points)       [ ] Paralysis (less than 1 month)                          (5 Points)  [ ] Prothrombin 12942 A                                      (3 Points)         [ ] Multiple Trauma (within 1month)                 (5Points)                                                                                                                                                                [ ] Factor V Leiden                                          (3 Points)                                OTHER RISK FACTORS                          [ ] Lupus anticoagulants                                     (3 Points)                       [ ] BMI > 40                          (1 Point)                                                         [ ] Anticardiolipin antibodies                                (3 Points)                   [ ] Smoking                              (1Point)                                                [ ] High homocysteine in the blood                      (3 Points)                [  ] Diabetes requiring insulin (1point)                         [ ] Other congenital or acquired thrombophilia       (3 Points)          [  ] Chemotherapy                   (1 Point)  [ ] Heparin induced thrombocytopenia                  (3 Points)             [  ] Blood Transfusion                (1 point)                                                                                                             Total Score [ 11 ]                                                                                                                   [id number] Score: 4     IMPROVE Bleeding Risk Score:    Falls Risk:   High (  )  Mod (  x)  Low (  )  crcl:  39.8       cr: 1.33         BMI 28.4                    Denies any personal or familial history of clotting or bleeding disorders.    Allergies    No Known Allergies    Intolerances    Mobic (Other)      REVIEW OF SYSTEMS    (  )Fever	     (  )Constipation	(  )SOB				(  )Headache	(  )Dysuria  (  )Chills	     (  )Melena	(  )Dyspnea present on exertion	                    (  )Dizziness                    (  )Polyuria  (  )Nausea	     (  )Hematochezia	(  )Cough			                    (  )Syncope   	(  )Hematuria  (  )Vomiting    (  )Chest Pain	(  )Wheezing			(  )Weakness  (X) KNEE PAIN  (  )Diarrhea     (  )Palpitations	(  )Anorexia			(  )Myalgia    Pertinent positives in HPI and daily subjective.  All other ROS negative.    Vital Signs Last 24 Hrs  T(C): 36.7 (22 @ 08:35), Max: 37.4 (22 @ 20:14)  T(F): 98 (22 @ 08:35), Max: 99.3 (22 @ 20:14)  HR: 77 (22 @ 09:30) (69 - 77)  BP: 125/58 (22 @ 09:30) (96/52 - 125/58)  BP(mean): --  RR: 18 (22 @ 11:45) (17 - 18)  SpO2: 90% (22 @ 11:45) (85% - 96%)    PHYSICAL EXAM:    Constitutional: Appears Well    Neurological: A& O x 3    Skin: Warm    Respiratory and Thorax: normal effort; Breath sounds: normal; +wheezing noted  	  Cardiovascular: S1, S2, regular, NMBR	    Gastrointestinal: BS + x 4Q, nontender	    Genitourinary:  Bladder nondistended, nontender    Musculoskeletal:   General Right:   no muscle/joint tenderness,   normal tone, no joint swelling,   ROM: full	    General Left:   no muscle/joint tenderness,   normal tone, no joint swelling,   ROM: full      Lower extrems:   Right: no calf tenderness              negative julián's sign               + pedal pulses    Left:   no calf tenderness              negative julián's sign               + pedal pulses    Knee rt: dsg CDI        DVT Prophylaxis:  LMWH                   (  )  Heparin SQ           (  )  Coumadin             (  )  Xarelto                  (  )  Eliquis                   (X  )  Venodynes           (X  )  Ambulation          (x  )  UFH                       (  )  Contraindicated  (  )  EC Aspirin             (  )        
  HPI: This is a 77 year old female diagnosed with OA left knee c/o left knee pain; presents to [facility] for planned left TKR.        Patient is a 77y old  Female who presents with a chief complaint of LEFT KNEE PAIN.     Consulted by Dr. Jerson Mast for VTE prophylaxis, risk stratification, and anticoagulation management.    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      Afib  on Eliquis      Malignant neoplasm of left female breast, unspecified estrogen receptor status, unspecified site of breast   , h/o chemo      Hyperlipidemia, unspecified hyperlipidemia type      COPD with emphysema  1 or less exacerbations/year      Hypertension      Borderline type 2 diabetes mellitus      Former smoker      Erythrocytosis  Chronic, compensatory - mild      Osteoarthritis  both knees L&gt;R      Left knee pain      History of ETOH abuse  last drink       On home oxygen therapy  2L as needed      Melanoma  back      Pneumonitis      Fatty liver      Anxiety      Thyroid nodule      Epistaxis  2022      H/O mastectomy, left   axillary node dissection      Artificial pacemaker  Medtronic Model [id number]  initially 2009,  generator change 2020 ,      History of arthroscopy of left knee        History of appendectomy  2018      Pacemaker lead fracture  replace atrial only 2021      History of tonsillectomy          FAMILY HISTORY:  FH: pancreatic cancer (Father)        Interval Note:  2022 Pt seen in PACU at bedside.DISCUSSED WITH PT RESUMING HER ELIQUIS tonight.  Pt has o concerns s/p left tkr.   2022 Pt seen at bedside on [facility].  Discussed her resuming her Eliquis  No concerns.  Pt states she wants to go home today depending on how she does with physical therapy.     CAPRINI SCORE  AGE RELATED RISK FACTORS                                                       MOBILITY RELATED FACTORS  [ ] Age 41-60 years                                            (1 Point)                  [ ] Bed rest                                                        (1 Point)  [ ] Age: 61-74 years                                           (2 Points)                [ ] Plaster cast                                                   (2 Points)  [x ] Age= 75 years                                              (3 Points)                 [ ] Bed bound for more than 72 hours                   (2 Points)    DISEASE RELATED RISK FACTORS                                               GENDER SPECIFIC FACTORS  [ ] Edema in the lower extremities                       (1 Point)           [ ] Pregnancy                                                            (1 Point)  [ ] Varicose veins                                               (1 Point)                  [ ] Post-partum < 6 weeks                                      (1 Point)             [x ] BMI > 25 Kg/m2                                            (1 Point)                  [ ] Hormonal therapy or oral contraception       (1 Point)                 [ ] Sepsis (in the previous month)                        (1 Point)             [ ] History of pregnancy complications                (1Point)  [ ] Pneumonia or serious lung disease                                             [ ] Unexplained or recurrent  (=/>3), premature                                 (In the previous month)                               (1 Point)                birth with toxemia or growth-restricted infant (1 Point)  [ ] Abnormal pulmonary function test            (1 Point)                                   SURGERY RELATED RISK FACTORS  [ ] Acute myocardial infarction                       (1 Point)                  [ ]  Section                                         (1 Point)  [ ] Congestive heart failure (in the previous month) (1 Point)   [ ] Minor surgery   lasting <45 minutes       (1 Point)   [ ] Inflammatory bowel disease                             (1 Point)          [ ] Arthroscopic surgery                                  (2 Points)  [ ] Central venous access                                    (2 Points)            [ ] General surgery lasting >45 minutes      (2 Points)       [ ] Stroke (in the previous month)                  (5 Points)            [x ] Elective major lower extremity arthroplasty (5 Points)                                   [ x ] Malignancy (present or past include skin melanoma                                          but exclude  basal skin cell)    (2 points)                                      TRAUMA RELATED RISK FACTORS                HEMATOLOGY RELATED FACTORS                                  [ ] Fracture of the hip, pelvis, or leg                       (5 Points)  [ ] Prior episodes of VTE                                     (3 Points)          [ ] Acute spinal cord injury (in the previous month)  (5 Points)  [ ] Positive family history for VTE                         (3 Points)       [ ] Paralysis (less than 1 month)                          (5 Points)  [ ] Prothrombin 57370 A                                      (3 Points)         [ ] Multiple Trauma (within 1month)                 (5Points)                                                                                                                                                                [ ] Factor V Leiden                                          (3 Points)                                OTHER RISK FACTORS                          [ ] Lupus anticoagulants                                     (3 Points)                       [ ] BMI > 40                          (1 Point)                                                         [ ] Anticardiolipin antibodies                                (3 Points)                   [ ] Smoking                              (1Point)                                                [ ] High homocysteine in the blood                      (3 Points)                [  ] Diabetes requiring insulin (1point)                         [ ] Other congenital or acquired thrombophilia       (3 Points)          [  ] Chemotherapy                   (1 Point)  [ ] Heparin induced thrombocytopenia                  (3 Points)             [  ] Blood Transfusion                (1 point)                                                                                                             Total Score [ 11 ]                                                                                                                   [id number] Score: 4     IMPROVE Bleeding Risk Score:    Falls Risk:   High (  )  Mod (  x)  Low (  )  crcl:  39.8       cr: 1.33         BMI 28.4                    Denies any personal or familial history of clotting or bleeding disorders.    Allergies    No Known Allergies    Intolerances    Mobic (Other)      REVIEW OF SYSTEMS    (  )Fever	     (  )Constipation	(  )SOB				(  )Headache	(  )Dysuria  (  )Chills	     (  )Melena	(  )Dyspnea present on exertion	                    (  )Dizziness                    (  )Polyuria  (  )Nausea	     (  )Hematochezia	(  )Cough			                    (  )Syncope   	(  )Hematuria  (  )Vomiting    (  )Chest Pain	(  )Wheezing			(  )Weakness  (X) KNEE PAIN  (  )Diarrhea     (  )Palpitations	(  )Anorexia			(  )Myalgia    Pertinent positives in HPI and daily subjective.  All other ROS negative.    Vital Signs Last 24 Hrs  T(C): 36.6 (22 @ 08:37), Max: 36.6 (22 @ 08:37)  T(F): 97.8 (22 @ 08:37), Max: 97.8 (22 @ 08:37)  HR: 74 (22 @ 08:37) (56 - 74)  BP: 120/85 (22 @ 08:37) (90/48 - 139/67)  BP(mean): --  RR: 18 (22 @ 08:37) (13 - 20)  SpO2: 96% (22 @ 08:37) (92% - 99%)      PHYSICAL EXAM:    Constitutional: Appears Well    Neurological: A& O x 3    Skin: Warm    Respiratory and Thorax: normal effort; Breath sounds: normal; No rales/wheezing/rhonchi  	  Cardiovascular: S1, S2, regular, NMBR	    Gastrointestinal: BS + x 4Q, nontender	    Genitourinary:  Bladder nondistended, nontender    Musculoskeletal:   General Right:   no muscle/joint tenderness,   normal tone, no joint swelling,   ROM: full	    General Left:   no muscle/joint tenderness,   normal tone, no joint swelling,   ROM: full      Lower extrems:   Right: no calf tenderness              negative julián's sign               + pedal pulses    Left:   no calf tenderness              negative julián's sign               + pedal pulses    Knee rt: dsg CDI        DVT Prophylaxis:  LMWH                   (  )  Heparin SQ           (  )  Coumadin             (  )  Xarelto                  (  )  Eliquis                   (X  )  Venodynes           (X  )  Ambulation          (x  )  UFH                       (  )  Contraindicated  (  )  EC Aspirin             (  )

## 2022-08-02 PROBLEM — I89.0 LYMPHEDEMA, NOT ELSEWHERE CLASSIFIED: Chronic | Status: ACTIVE | Noted: 2022-07-29

## 2022-08-04 DIAGNOSIS — F41.9 ANXIETY DISORDER, UNSPECIFIED: ICD-10-CM

## 2022-08-04 DIAGNOSIS — Z85.3 PERSONAL HISTORY OF MALIGNANT NEOPLASM OF BREAST: ICD-10-CM

## 2022-08-04 DIAGNOSIS — E78.5 HYPERLIPIDEMIA, UNSPECIFIED: ICD-10-CM

## 2022-08-04 DIAGNOSIS — Z99.81 DEPENDENCE ON SUPPLEMENTAL OXYGEN: ICD-10-CM

## 2022-08-04 DIAGNOSIS — I48.91 UNSPECIFIED ATRIAL FIBRILLATION: ICD-10-CM

## 2022-08-04 DIAGNOSIS — R73.03 PREDIABETES: ICD-10-CM

## 2022-08-04 DIAGNOSIS — M17.12 UNILATERAL PRIMARY OSTEOARTHRITIS, LEFT KNEE: ICD-10-CM

## 2022-08-04 DIAGNOSIS — Z87.891 PERSONAL HISTORY OF NICOTINE DEPENDENCE: ICD-10-CM

## 2022-08-04 DIAGNOSIS — G89.29 OTHER CHRONIC PAIN: ICD-10-CM

## 2022-08-04 DIAGNOSIS — I10 ESSENTIAL (PRIMARY) HYPERTENSION: ICD-10-CM

## 2022-08-04 DIAGNOSIS — Z79.01 LONG TERM (CURRENT) USE OF ANTICOAGULANTS: ICD-10-CM

## 2022-08-04 DIAGNOSIS — Z92.21 PERSONAL HISTORY OF ANTINEOPLASTIC CHEMOTHERAPY: ICD-10-CM

## 2022-08-04 DIAGNOSIS — Z95.0 PRESENCE OF CARDIAC PACEMAKER: ICD-10-CM

## 2022-08-04 DIAGNOSIS — J43.9 EMPHYSEMA, UNSPECIFIED: ICD-10-CM

## 2022-08-04 DIAGNOSIS — Z20.822 CONTACT WITH AND (SUSPECTED) EXPOSURE TO COVID-19: ICD-10-CM

## 2022-08-04 DIAGNOSIS — J96.20 ACUTE AND CHRONIC RESPIRATORY FAILURE, UNSPECIFIED WHETHER WITH HYPOXIA OR HYPERCAPNIA: ICD-10-CM

## 2022-08-17 ENCOUNTER — APPOINTMENT (OUTPATIENT)
Dept: ORTHOPEDIC SURGERY | Facility: CLINIC | Age: 77
End: 2022-08-17

## 2022-08-17 PROCEDURE — 73560 X-RAY EXAM OF KNEE 1 OR 2: CPT | Mod: LT

## 2022-08-17 PROCEDURE — 99024 POSTOP FOLLOW-UP VISIT: CPT

## 2022-08-22 NOTE — ADDENDUM
[FreeTextEntry1] : This note was written by Koko Sepulveda on 08/22/2022, acting as a scribe for Jerson Mast III, MD

## 2022-08-22 NOTE — HISTORY OF PRESENT ILLNESS
[de-identified] : Postop Left Knee [de-identified] : The patient comes in today for her left knee.  She states she is definitely feeling better. [de-identified] : Left Knee: Range of Motion in Degrees\par 	\par 	                  Claimant:    Normal:	\par Flexion Active	    120	    135-degrees	\par Flexion Passive	    120	    135-degrees	\par Extension Active	    10	    0-5-degrees	\par Extension Passive	    10	    0-5-degrees	\par \par She has a little redness at the lower end of the wound.  There is no discharge.\par  [de-identified] : Radiographs, one to two views of the left knee taken in the office today, show excellent position of the implant. [de-identified] : Status post left total knee [de-identified] : At this time, since the patient is overall doing well status post left total knee, I recommended a course of an oral antibiotic for a little cellulitis.  She will be reassessed in 3-4 weeks.\par

## 2022-08-25 ENCOUNTER — APPOINTMENT (OUTPATIENT)
Dept: ORTHOPEDIC SURGERY | Facility: CLINIC | Age: 77
End: 2022-08-25

## 2022-08-25 PROCEDURE — 99024 POSTOP FOLLOW-UP VISIT: CPT

## 2022-08-29 NOTE — ADDENDUM
[FreeTextEntry1] : This note was written by Heidi Davis on 08/29/2022 acting as scribe for Jerson Mast III, MD

## 2022-08-29 NOTE — HISTORY OF PRESENT ILLNESS
[de-identified] : Postop Left Knee [de-identified] : The patient comes in today status post left total knee arthroplasty on 07/29/2022.  She states she feels great. [de-identified] : Left Knee:\par Knee: Range of Motion in Degrees	\par 	                  Claimant:	Normal:	\par Flexion Active	  130 	                135-degrees	\par Flexion Passive	  130	                135-degrees	\par Extension Active	  0	                0-5-degrees	\par Extension Passive	  0	                0-5-degrees	 \par \par No instability.  Wound is well-healed.  There are no signs of infection.  [de-identified] : Status post left total knee arthroplasty [de-identified] : The patient is doing well status post left total knee arthroplasty with a resolved cellulitis. At this time, she will start on outpatient therapy and be reassessed in two months.

## 2022-09-01 NOTE — ED ADULT NURSE NOTE - CARDIO ASSESSMENT
Leanne MANZO from [facility] called with AMANDA for Dr Mayo.  Patient is wanting a lift chair but according to the Medicare guidelines, he does not qualify because he can sit and stand on his own. (Family says he does have trouble doing this if he is tired)  She is going to tell him and his family that he would need a face to face visit with his PCP to get an order and they can certainly schedule this but according to her assessment he does not qualify. Pt doesn't want to pay for it on his own. She did tell them that Medicare only pays for 80% of the motor which is $200 of the $1200 cost of the chair.        ---

## 2022-09-06 DIAGNOSIS — M17.12 UNILATERAL PRIMARY OSTEOARTHRITIS, LEFT KNEE: ICD-10-CM

## 2022-09-07 ENCOUNTER — NON-APPOINTMENT (OUTPATIENT)
Age: 77
End: 2022-09-07

## 2022-09-07 ENCOUNTER — APPOINTMENT (OUTPATIENT)
Dept: ELECTROPHYSIOLOGY | Facility: CLINIC | Age: 77
End: 2022-09-07

## 2022-09-07 VITALS
HEIGHT: 63 IN | OXYGEN SATURATION: 91 % | HEART RATE: 85 BPM | SYSTOLIC BLOOD PRESSURE: 135 MMHG | BODY MASS INDEX: 26.75 KG/M2 | WEIGHT: 151 LBS | DIASTOLIC BLOOD PRESSURE: 79 MMHG

## 2022-09-07 PROCEDURE — 93000 ELECTROCARDIOGRAM COMPLETE: CPT | Mod: 59

## 2022-09-07 PROCEDURE — 93279 PRGRMG DEV EVAL PM/LDLS PM: CPT

## 2022-09-07 RX ORDER — CEPHALEXIN 500 MG/1
500 TABLET ORAL
Qty: 40 | Refills: 0 | Status: COMPLETED | COMMUNITY
Start: 2022-08-17 | End: 2022-09-07

## 2022-09-07 RX ORDER — TRAMADOL HYDROCHLORIDE 50 MG/1
50 TABLET, COATED ORAL
Qty: 40 | Refills: 0 | Status: COMPLETED | COMMUNITY
Start: 2022-08-05 | End: 2022-09-07

## 2022-09-13 ENCOUNTER — APPOINTMENT (OUTPATIENT)
Dept: GASTROENTEROLOGY | Facility: CLINIC | Age: 77
End: 2022-09-13

## 2022-09-13 VITALS
DIASTOLIC BLOOD PRESSURE: 82 MMHG | BODY MASS INDEX: 26.22 KG/M2 | SYSTOLIC BLOOD PRESSURE: 135 MMHG | HEART RATE: 84 BPM | OXYGEN SATURATION: 91 % | WEIGHT: 148 LBS | HEIGHT: 63 IN

## 2022-09-13 DIAGNOSIS — K76.0 FATTY (CHANGE OF) LIVER, NOT ELSEWHERE CLASSIFIED: ICD-10-CM

## 2022-09-13 DIAGNOSIS — Z86.010 PERSONAL HISTORY OF COLONIC POLYPS: ICD-10-CM

## 2022-09-13 DIAGNOSIS — Z83.79 FAMILY HISTORY OF OTHER DISEASES OF THE DIGESTIVE SYSTEM: ICD-10-CM

## 2022-09-13 DIAGNOSIS — R11.0 NAUSEA: ICD-10-CM

## 2022-09-13 DIAGNOSIS — R63.0 ANOREXIA: ICD-10-CM

## 2022-09-13 PROCEDURE — 99203 OFFICE O/P NEW LOW 30 MIN: CPT

## 2022-09-13 RX ORDER — FLUTICASONE PROPIONATE AND SALMETEROL 50; 100 UG/1; UG/1
100-50 POWDER RESPIRATORY (INHALATION)
Refills: 0 | Status: ACTIVE | COMMUNITY

## 2022-09-13 RX ORDER — FLUTICASONE PROPIONATE 50 UG/1
50 SPRAY, METERED NASAL
Qty: 16 | Refills: 0 | Status: DISCONTINUED | COMMUNITY
Start: 2021-06-30 | End: 2022-09-13

## 2022-09-13 RX ORDER — ZOLPIDEM TARTRATE 10 MG/1
10 TABLET, FILM COATED ORAL
Refills: 0 | Status: ACTIVE | COMMUNITY

## 2022-09-13 NOTE — PHYSICAL EXAM
[Alert] : alert [Healthy Appearing] : healthy appearing [No Acute Distress] : no acute distress [Well Developed] : well developed [Well Nourished] : well nourished [Sclera] : the sclera and conjunctiva were normal [Hearing Threshold Finger Rub Not [place]] : hearing was normal [Normal Appearance] : the appearance of the neck was normal [No Neck Mass] : no neck mass was observed [No Respiratory Distress] : no respiratory distress [Auscultation Breath Sounds / Voice Sounds] : lungs were clear to auscultation bilaterally [Heart Sounds Gallop] : no gallops [No Rubs] : no pericardial rub [Pacemaker/Defibrillator] : Patient has pacemaker/defibrillator [Bowel Sounds] : normal bowel sounds [No Masses] : no abdominal mass palpated [Abdomen Soft] : soft [] : no hepatosplenomegaly [No Hernia] : no hernia [No Edema] : no edema [RUQ] : RUQ [Ascites] : no ascites [Rebound Tenderness] : no rebound tenderness [de-identified] : Deferred

## 2022-09-13 NOTE — REVIEW OF SYSTEMS
[SOB on Exertion] : shortness of breath during exertion [Shortness Of Breath] : no shortness of breath [Wheezing] : no wheezing [Cough] : no cough [Abdominal Pain] : no abdominal pain [Vomiting] : no vomiting [Constipation] : no constipation [Diarrhea] : no diarrhea [Heartburn] : no heartburn [Melena (black stool)] : no melena [Bleeding] : no bleeding [Fecal Incontinence (soiling)] : no fecal incontinence [Bloating (gassiness)] : no bloating

## 2022-09-13 NOTE — ASSESSMENT
[FreeTextEntry1] : 77-year-old female complaining of nausea and anorexia since having total knee replacement on July 29, 2022.  The patient has been treated by her primary care physician Dr. Tee Okeefe with Zofran which she takes sporadically.  Will have patient take Zofran 3-4 times a day and and omeprazole 40 mg daily.  Patient to follow-up in 1 month.  We will consider upper endoscopy if patient not symptomatically improved but will require both cardiology and pulmonary clearance.

## 2022-09-13 NOTE — HISTORY OF PRESENT ILLNESS
[de-identified] : Upper endoscopy performed approximately 6 to 7 years ago performed by Dr. Jair schmid reportedly revealed gastritis.  Pathology report not available [FreeTextEntry1] : Colonoscopy with polypectomy performed by Dr. Vini schmid approximately 8 to 9 years ago reportedly revealed polyps.  Pathology not available. [de-identified] : Abdominal sonogram performed approximately 3 years ago reportedly revealed fatty liver

## 2022-10-06 ENCOUNTER — APPOINTMENT (OUTPATIENT)
Dept: ORTHOPEDIC SURGERY | Facility: CLINIC | Age: 77
End: 2022-10-06

## 2022-10-06 VITALS
HEART RATE: 84 BPM | WEIGHT: 149 LBS | TEMPERATURE: 97.8 F | HEIGHT: 63 IN | BODY MASS INDEX: 26.4 KG/M2 | DIASTOLIC BLOOD PRESSURE: 84 MMHG | SYSTOLIC BLOOD PRESSURE: 155 MMHG

## 2022-10-06 PROCEDURE — 99024 POSTOP FOLLOW-UP VISIT: CPT

## 2022-10-06 PROCEDURE — 73560 X-RAY EXAM OF KNEE 1 OR 2: CPT | Mod: LT

## 2022-10-11 ENCOUNTER — APPOINTMENT (OUTPATIENT)
Dept: GASTROENTEROLOGY | Facility: CLINIC | Age: 77
End: 2022-10-11

## 2022-10-11 NOTE — ADDENDUM
[FreeTextEntry1] : This note was written by Heidi Davis on 10/11/2022 acting as scribe for Jerson Mast III, MD

## 2022-10-11 NOTE — HISTORY OF PRESENT ILLNESS
[de-identified] : Postop Left Knee [de-identified] : The patient comes in today status post left total knee arthroplasty on 07/29/2022.  She states overall she feels great.  Occasionally, she feels a little weakness in her quad. [de-identified] : Left Knee:\par Knee: Range of Motion in Degrees	\par 	                  Claimant:	Normal:	\par Flexion Active	  135 	                135-degrees	\par Flexion Passive	  135	                135-degrees	\par Extension Active	  0-5	                0-5-degrees	\par Extension Passive	  0-5	                0-5-degrees	 \par \par No instability in the AP plane to varus or valgus stress.  Well-healed scar.  [de-identified] : Radiographs, one to two views of the left knee taken in the office today, show excellent position of the implant. [de-identified] : Status post left total knee arthroplasty [de-identified] : The patient is doing well overall status post left total knee arthroplasty.  At this time, I encouraged her to continue with therapy to maximize her quadriceps strength.  She will be reassessed in 2-3 months.

## 2022-10-14 ENCOUNTER — APPOINTMENT (OUTPATIENT)
Dept: ELECTROPHYSIOLOGY | Facility: CLINIC | Age: 77
End: 2022-10-14

## 2022-10-14 ENCOUNTER — NON-APPOINTMENT (OUTPATIENT)
Age: 77
End: 2022-10-14

## 2022-10-14 PROCEDURE — 93296 REM INTERROG EVL PM/IDS: CPT

## 2022-10-14 PROCEDURE — 93294 REM INTERROG EVL PM/LDLS PM: CPT

## 2022-11-23 ENCOUNTER — NON-APPOINTMENT (OUTPATIENT)
Age: 77
End: 2022-11-23

## 2022-11-23 ENCOUNTER — APPOINTMENT (OUTPATIENT)
Dept: ELECTROPHYSIOLOGY | Facility: CLINIC | Age: 77
End: 2022-11-23

## 2022-11-23 VITALS
HEART RATE: 86 BPM | SYSTOLIC BLOOD PRESSURE: 116 MMHG | DIASTOLIC BLOOD PRESSURE: 68 MMHG | OXYGEN SATURATION: 95 % | BODY MASS INDEX: 26.58 KG/M2 | HEIGHT: 63 IN | WEIGHT: 150 LBS

## 2022-11-23 PROCEDURE — 93279 PRGRMG DEV EVAL PM/LDLS PM: CPT

## 2022-11-23 RX ORDER — ALPRAZOLAM 0.5 MG/1
0.5 TABLET ORAL
Refills: 0 | Status: DISCONTINUED | COMMUNITY
End: 2022-11-23

## 2022-11-23 RX ORDER — DEXTROMETHORPHAN HYDROBROMIDE, GUAIFENESIN, AND PHENYLEPHRINE HYDROCHLORIDE 20; 400; 10 MG/20ML; MG/20ML; MG/20ML
10-20-400 SOLUTION ORAL
Refills: 0 | Status: DISCONTINUED | COMMUNITY
End: 2022-11-23

## 2022-11-23 RX ORDER — OMEPRAZOLE 40 MG/1
40 CAPSULE, DELAYED RELEASE ORAL
Qty: 30 | Refills: 3 | Status: DISCONTINUED | COMMUNITY
Start: 2022-09-13 | End: 2022-11-23

## 2022-11-23 RX ORDER — ZOLPIDEM TARTRATE 10 MG/1
10 TABLET, FILM COATED ORAL
Refills: 0 | Status: DISCONTINUED | COMMUNITY
End: 2022-11-23

## 2022-11-23 RX ORDER — ONDANSETRON 4 MG/1
4 TABLET, ORALLY DISINTEGRATING ORAL 4 TIMES DAILY
Qty: 120 | Refills: 3 | Status: DISCONTINUED | COMMUNITY
Start: 2022-09-13 | End: 2022-11-23

## 2022-11-23 RX ORDER — BENAZEPRIL HYDROCHLORIDE 20 MG/1
20 TABLET, FILM COATED ORAL
Refills: 0 | Status: DISCONTINUED | COMMUNITY
End: 2022-11-23

## 2023-01-06 NOTE — ED PROVIDER NOTE - CROS ED GI ALL NEG
Preventive Care Visit  [facility] AND [facility]  Kenia Peterson MD, Pediatrics  Jan 6, 2023  Assessment & Plan   18 month old, here for preventive care.      ICD-10-CM    1. Encounter for routine child health examination w/o abnormal findings  Z00.129 DEVELOPMENTAL TEST, ALEGRIA     M-CHAT Development Testing     sodium fluoride (VANISH) 5% white varnish 1 packet     AK APPLICATION TOPICAL FLUORIDE VARNISH BY PHS/QHP     HEP A PED/ADOL        I reassured mom that Ayana doesn't have an infection. The crying may be related to the recent move.    Patient has been advised of split billing requirements and indicates understanding: Yes  Growth      Normal OFC, length and weight    Immunizations   Appropriate vaccinations were ordered.    Anticipatory Guidance    Reviewed age appropriate anticipatory guidance.   Reviewed Anticipatory Guidance in patient instructions    Referrals/Ongoing Specialty Care  None  Verbal Dental Referral: Verbal dental referral was given  Dental Fluoride Varnish: Yes, fluoride varnish application risks and benefits were discussed, and verbal consent was received.    Follow Up      Return in 6 months (on 7/6/2023) for Preventive Care visit.    Subjective   Ayana has been crying a lot lately.  No fever.  She slept through the night for the last three nights.  No change in urination.  No hematuria.        The family just moved to [place].    Additional Questions 10/31/2022   Accompanied by Mom   Questions for today's visit No   Surgery, major illness, or injury since last physical No     Social 1/1/2023   Lives with Parent(s), Sibling(s)   Who takes care of your child? Parent(s)   Recent potential stressors (!) RECENT MOVE   History of trauma No   Family Hx mental health challenges (!) YES   Lack of transportation has limited access to appts/meds No   Difficulty paying mortgage/rent on time No   Lack of steady place to sleep/has slept in a shelter No     Health Risks/Safety 1/1/2023   What  type of car seat does your child use?  Car seat with harness   Is your child's car seat forward or rear facing? Rear facing   Where does your child sit in the car?  Back seat   Are stairs gated at home? -   Do you use space heaters, wood stove, or a fireplace in your home? No   Are poisons/cleaning supplies and medications kept out of reach? (!) NO   Do you have a swimming pool? No   Do you have guns/firearms in the home? (!) YES   Are the guns/firearms secured in a safe or with a trigger lock? Yes   Is ammunition stored separately from guns? Yes     TB Screening 1/1/2023   Was your child born outside of the [place]? No     TB Screening: Consider immunosuppression as a risk factor for TB 1/1/2023   Recent TB infection or positive TB test in family/close contacts No   Recent travel outside [place] (child/family/close contacts) No   Recent residence in high-risk group setting (correctional facility/health care facility/homeless shelter/refugee camp) No      Dental Screening 1/1/2023   Has your child had cavities in the last 2 years? No   Have parents/caregivers/siblings had cavities in the last 2 years? (!) YES, IN THE LAST 7-23 MONTHS- MODERATE RISK     Diet 1/1/2023   Questions about feeding? No   How does your child eat?  Sippy cup, Cup, Self-feeding   What does your child regularly drink? Water, Cow's Milk   What type of milk? Whole   What type of water? Tap, (!) BOTTLED, (!) FILTERED   Vitamin or supplement use Vitamin D, Multi-vitamin with Iron   How often does your family eat meals together? Most days   How many snacks does your child eat per day 3   Are there types of foods your child won't eat? No   In past 12 months, concerned food might run out Never true   In past 12 months, food has run out/couldn't afford more Never true     Elimination 1/1/2023   Bowel or bladder concerns? (!) OTHER   Please specify: She has cried when she has went to pee. She seems to just generally be in discomfort.     Media Use  "1/1/2023   Hours per day of screen time (for entertainment) 2     Sleep 1/1/2023   Do you have any concerns about your child's sleep? (!) WAKING AT NIGHT, (!) SLEEP RESISTANCE     Vision/Hearing 1/1/2023   Vision or hearing concerns No concerns     Development/ Social-Emotional Screen 1/1/2023   Does your child receive any special services? No     Development - M-CHAT and ASQ required for C&TC  Screening tool used, reviewed with parent/guardian: Electronic M-CHAT-R   MCHAT-R Total Score 1/1/2023   M-Chat Score 2 (Low-risk)      Follow-up:  LOW-RISK: Total Score is 0-2. No follow up necessary  ASQ 18 M Communication Gross Motor Fine Motor Problem Solving Personal-social   Score 50 45 45 40 40   Cutoff 13.06 37.38 34.32 25.74 27.19   Result Passed Passed Passed Passed Passed              Objective     Exam  Pulse 128   Temp 99.6  F (37.6  C) (Tympanic)   Resp 28   Ht 2' 10\" (0.864 m)   Wt 25 lb 11.2 oz (11.7 kg)   HC 18.5\" (47 cm)   BMI 15.63 kg/m    68 %ile (Z= 0.46) based on WHO (Girls, 0-2 years) head circumference-for-age based on Head Circumference recorded on 1/6/2023.  83 %ile (Z= 0.94) based on WHO (Girls, 0-2 years) weight-for-age data using vitals from 1/6/2023.  96 %ile (Z= 1.71) based on WHO (Girls, 0-2 years) Length-for-age data based on Length recorded on 1/6/2023.  54 %ile (Z= 0.10) based on WHO (Girls, 0-2 years) weight-for-recumbent length data based on body measurements available as of 1/6/2023.    Physical Exam  GENERAL: Alert, well appearing, no distress  SKIN: Clear. No significant rash, abnormal pigmentation or lesions  HEAD: Normocephalic.  EYES:  Symmetric light reflex and no eye movement on cover/uncover test. Normal conjunctivae.  EARS: Normal canals. Tympanic membranes are normal; gray and translucent.  NOSE: Normal without discharge.  MOUTH/THROAT: Clear. No oral lesions. Teeth without obvious abnormalities.  NECK: Supple, no masses.  No thyromegaly.  LYMPH NODES: No " adenopathy  LUNGS: Clear. No rales, rhonchi, wheezing or retractions  HEART: Regular rhythm. Normal S1/S2. No murmurs. Normal pulses.  ABDOMEN: Soft, non-tender, not distended, no masses or hepatosplenomegaly. Bowel sounds normal.   GENITALIA: Normal female external genitalia. Geoffrey stage I,  No inguinal herniae are present.  EXTREMITIES: Full range of motion, no deformities  NEUROLOGIC: No focal findings. Cranial nerves grossly intact: DTR's normal. Normal gait, strength and tone        Screening Questionnaire for Pediatric Immunization    1. Is the child sick today?  No  2. Does the child have allergies to medications, food, a vaccine component, or latex? No  3. Has the child had a serious reaction to a vaccine in the past? No  4. Has the child had a health problem with lung, heart, kidney or metabolic disease (e.g., diabetes), asthma, a blood disorder, no spleen, complement component deficiency, a cochlear implant, or a spinal fluid leak?  Is he/she on long-term aspirin therapy? No  5. If the child to be vaccinated is 2 through 4 years of age, has a healthcare provider told you that the child had wheezing or asthma in the  past 12 months? No  6. If your child is a baby, have you ever been told he or she has had intussusception?  No  7. Has the child, sibling or parent had a seizure; has the child had brain or other nervous system problems?  No  8. Does the child or a family member have cancer, leukemia, HIV/AIDS, or any other immune system problem?  No  9. In the past 3 months, has the child taken medications that affect the immune system such as prednisone, other steroids, or anticancer drugs; drugs for the treatment of rheumatoid arthritis, Crohn's disease, or psoriasis; or had radiation treatments?  No  10. In the past year, has the child received a transfusion of blood or blood products, or been given immune (gamma) globulin or an antiviral drug?  No  11. Is the child/teen pregnant or is there a chance that  she could become  pregnant during the next month?  No  12. Has the child received any vaccinations in the past 4 weeks?  No     Immunization questionnaire answers were all negative.    [place] self[place]screening form given to patient.      Screening performed by Signed by Kenia Peterson MD .....1/6/2023 3:58  PM                                                                                                                                                                                                                                                                                                                                                                                                                                                                                                                                                                                                                                                                                                                                                                                                                                                                                                                                                                                                                                                                                                                                                                                                                                                                                                                                                                                                                                                                                                                                                                                                                                                                                                                                                                                                                                                                                                                                                                                                                                                                                                                                                                                                                                                                                                                                                                                                                                                                                                                                                                                                                                                                                                                                                                                                                                                                                                                                                                                                                                                                                                                                                                                                                                                                                                                                                                                                                                                                                                                                                                                                                                                                                                                                                                                                                                                                                                                                                                                 Kenia Peterson MD  [facility]   negative...

## 2023-02-22 ENCOUNTER — APPOINTMENT (OUTPATIENT)
Dept: ELECTROPHYSIOLOGY | Facility: CLINIC | Age: 78
End: 2023-02-22
Payer: MEDICARE

## 2023-02-23 ENCOUNTER — NON-APPOINTMENT (OUTPATIENT)
Age: 78
End: 2023-02-23

## 2023-02-23 PROCEDURE — 93296 REM INTERROG EVL PM/IDS: CPT

## 2023-02-23 PROCEDURE — 93294 REM INTERROG EVL PM/LDLS PM: CPT

## 2023-04-14 ENCOUNTER — NON-APPOINTMENT (OUTPATIENT)
Age: 78
End: 2023-04-14

## 2023-04-26 ENCOUNTER — APPOINTMENT (OUTPATIENT)
Dept: GASTROENTEROLOGY | Facility: CLINIC | Age: 78
End: 2023-04-26
Payer: MEDICARE

## 2023-04-26 VITALS
BODY MASS INDEX: 27.46 KG/M2 | HEART RATE: 91 BPM | HEIGHT: 63 IN | SYSTOLIC BLOOD PRESSURE: 125 MMHG | DIASTOLIC BLOOD PRESSURE: 68 MMHG | WEIGHT: 155 LBS

## 2023-04-26 DIAGNOSIS — R19.5 OTHER FECAL ABNORMALITIES: ICD-10-CM

## 2023-04-26 PROCEDURE — 99204 OFFICE O/P NEW MOD 45 MIN: CPT

## 2023-04-26 NOTE — PHYSICAL EXAM
[Alert] : alert [No Acute Distress] : no acute distress [Bowel Sounds] : normal bowel sounds [Sclera] : the sclera and conjunctiva were normal [Abdomen Tenderness] : non-tender [Abdomen Soft] : soft [Abnormal Walk] : normal gait [Normal Color / Pigmentation] : normal skin color and pigmentation [Oriented To Time, Place, And Person] : oriented to person, place, and time

## 2023-04-26 NOTE — HISTORY OF PRESENT ILLNESS
[FreeTextEntry1] : Tracee Bright is a 78 year old female PMH emphysema, atrial fibrillation on eliquis with PPM in place sent by her PCP for positive cologuard. Last colonsocopy was 8 years ago, reportedly had polyps. Denies FMH of CRC. Denies bowel complaints, typically has bowel movements regularly without significant straining or overt bleeding such as melena or hematochezia. Denies upper GI symptoms such as GERD, nausea, vomiting, or dysphagia. Denies unintentional weight loss.

## 2023-04-26 NOTE — ASSESSMENT
[FreeTextEntry1] : Plan:\par Discussed probability of false positive cologuard test however, would recommend colonoscopy to evaluate. Risks versus benefits as well as instructions reviewed, pt agrees to planned procedure. Given multiple comorbidities, would recommend in hospital setting, would also require clearance from her cardiologist and pulmonologist. All questions answered, pt expressed understanding. Discussed with Dr. Griffiths, I have spent 45 minutes on this encounter.

## 2023-05-15 ENCOUNTER — NON-APPOINTMENT (OUTPATIENT)
Age: 78
End: 2023-05-15

## 2023-05-24 ENCOUNTER — APPOINTMENT (OUTPATIENT)
Dept: ELECTROPHYSIOLOGY | Facility: CLINIC | Age: 78
End: 2023-05-24
Payer: MEDICARE

## 2023-05-25 ENCOUNTER — NON-APPOINTMENT (OUTPATIENT)
Age: 78
End: 2023-05-25

## 2023-05-25 PROCEDURE — 93294 REM INTERROG EVL PM/LDLS PM: CPT

## 2023-05-25 PROCEDURE — 93296 REM INTERROG EVL PM/IDS: CPT

## 2023-07-07 ENCOUNTER — APPOINTMENT (OUTPATIENT)
Dept: GASTROENTEROLOGY | Facility: HOSPITAL | Age: 78
End: 2023-07-07
Payer: MEDICARE

## 2023-07-07 ENCOUNTER — OUTPATIENT (OUTPATIENT)
Dept: OUTPATIENT SERVICES | Facility: HOSPITAL | Age: 78
LOS: 1 days | Discharge: HOME | End: 2023-07-07
Payer: MEDICARE

## 2023-07-07 VITALS
DIASTOLIC BLOOD PRESSURE: 73 MMHG | RESPIRATION RATE: 40 BRPM | TEMPERATURE: 98 F | WEIGHT: 154.98 LBS | SYSTOLIC BLOOD PRESSURE: 152 MMHG | OXYGEN SATURATION: 95 % | HEIGHT: 63 IN | HEART RATE: 87 BPM

## 2023-07-07 DIAGNOSIS — Z90.49 ACQUIRED ABSENCE OF OTHER SPECIFIED PARTS OF DIGESTIVE TRACT: Chronic | ICD-10-CM

## 2023-07-07 DIAGNOSIS — Z90.12 ACQUIRED ABSENCE OF LEFT BREAST AND NIPPLE: Chronic | ICD-10-CM

## 2023-07-07 DIAGNOSIS — Z98.890 OTHER SPECIFIED POSTPROCEDURAL STATES: Chronic | ICD-10-CM

## 2023-07-07 DIAGNOSIS — Z12.11 ENCOUNTER FOR SCREENING FOR MALIGNANT NEOPLASM OF COLON: ICD-10-CM

## 2023-07-07 DIAGNOSIS — Z95.0 PRESENCE OF CARDIAC PACEMAKER: Chronic | ICD-10-CM

## 2023-07-07 DIAGNOSIS — Z90.89 ACQUIRED ABSENCE OF OTHER ORGANS: Chronic | ICD-10-CM

## 2023-07-07 DIAGNOSIS — T82.110A BREAKDOWN (MECHANICAL) OF CARDIAC ELECTRODE, INITIAL ENCOUNTER: Chronic | ICD-10-CM

## 2023-07-07 PROCEDURE — 88305 TISSUE EXAM BY PATHOLOGIST: CPT

## 2023-07-07 PROCEDURE — 45380 COLONOSCOPY AND BIOPSY: CPT | Mod: GC,59

## 2023-07-07 PROCEDURE — C1889: CPT

## 2023-07-07 PROCEDURE — 45385 COLONOSCOPY W/LESION REMOVAL: CPT | Mod: GC

## 2023-07-07 PROCEDURE — 88305 TISSUE EXAM BY PATHOLOGIST: CPT | Mod: 26

## 2023-07-07 NOTE — ASU PATIENT PROFILE, ADULT - NSICDXPASTMEDICALHX_GEN_ALL_CORE_FT
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

## 2023-07-07 NOTE — ASU PATIENT PROFILE, ADULT - NSICDXPASTSURGICALHX_GEN_ALL_CORE_FT
PAST SURGICAL HISTORY:  Artificial pacemaker Medtronic Model [id number]  initially 7/28/2009,  generator change 9/30/2020 ,    H/O mastectomy, left 1995 axillary node dissection    History of appendectomy 2018    History of arthroscopy of left knee 2007    History of tonsillectomy     Pacemaker lead fracture replace atrial only 8/2021

## 2023-07-07 NOTE — ASU PATIENT PROFILE, ADULT - FALL HARM RISK - UNIVERSAL INTERVENTIONS
Bed in lowest position, wheels locked, appropriate side rails in place/Call bell, personal items and telephone in reach/Instruct patient to call for assistance before getting out of bed or chair/Non-slip footwear when patient is out of bed/[place] to call system/Physically safe environment - no spills, clutter or unnecessary equipment/Purposeful Proactive Rounding/Room/bathroom lighting operational, light cord in reach

## 2023-07-11 LAB — SURGICAL PATHOLOGY STUDY: SIGNIFICANT CHANGE UP

## 2023-07-12 DIAGNOSIS — E78.5 HYPERLIPIDEMIA, UNSPECIFIED: ICD-10-CM

## 2023-07-12 DIAGNOSIS — Z85.3 PERSONAL HISTORY OF MALIGNANT NEOPLASM OF BREAST: ICD-10-CM

## 2023-07-12 DIAGNOSIS — F41.9 ANXIETY DISORDER, UNSPECIFIED: ICD-10-CM

## 2023-07-12 DIAGNOSIS — D12.8 BENIGN NEOPLASM OF RECTUM: ICD-10-CM

## 2023-07-12 DIAGNOSIS — Z88.6 ALLERGY STATUS TO ANALGESIC AGENT: ICD-10-CM

## 2023-07-12 DIAGNOSIS — K76.0 FATTY (CHANGE OF) LIVER, NOT ELSEWHERE CLASSIFIED: ICD-10-CM

## 2023-07-12 DIAGNOSIS — Z95.0 PRESENCE OF CARDIAC PACEMAKER: ICD-10-CM

## 2023-07-12 DIAGNOSIS — Z96.652 PRESENCE OF LEFT ARTIFICIAL KNEE JOINT: ICD-10-CM

## 2023-07-12 DIAGNOSIS — Z79.01 LONG TERM (CURRENT) USE OF ANTICOAGULANTS: ICD-10-CM

## 2023-07-12 DIAGNOSIS — R19.5 OTHER FECAL ABNORMALITIES: ICD-10-CM

## 2023-07-12 DIAGNOSIS — I10 ESSENTIAL (PRIMARY) HYPERTENSION: ICD-10-CM

## 2023-07-12 DIAGNOSIS — I49.5 SICK SINUS SYNDROME: ICD-10-CM

## 2023-07-12 DIAGNOSIS — J44.9 CHRONIC OBSTRUCTIVE PULMONARY DISEASE, UNSPECIFIED: ICD-10-CM

## 2023-07-12 DIAGNOSIS — Z87.891 PERSONAL HISTORY OF NICOTINE DEPENDENCE: ICD-10-CM

## 2023-08-15 ENCOUNTER — APPOINTMENT (OUTPATIENT)
Dept: BREAST CENTER | Facility: CLINIC | Age: 78
End: 2023-08-15
Payer: MEDICARE

## 2023-08-15 VITALS
SYSTOLIC BLOOD PRESSURE: 122 MMHG | WEIGHT: 157 LBS | BODY MASS INDEX: 27.82 KG/M2 | HEART RATE: 80 BPM | DIASTOLIC BLOOD PRESSURE: 67 MMHG | HEIGHT: 63 IN

## 2023-08-15 DIAGNOSIS — Z08 ENCOUNTER FOR FOLLOW-UP EXAMINATION AFTER COMPLETED TREATMENT FOR MALIGNANT NEOPLASM: ICD-10-CM

## 2023-08-15 DIAGNOSIS — Z85.3 ENCOUNTER FOR FOLLOW-UP EXAMINATION AFTER COMPLETED TREATMENT FOR MALIGNANT NEOPLASM: ICD-10-CM

## 2023-08-15 PROCEDURE — 99213 OFFICE O/P EST LOW 20 MIN: CPT

## 2023-08-15 NOTE — HISTORY OF PRESENT ILLNESS
[FreeTextEntry1] : Ms. Bright is a 78 year old woman here for a follow-up for surveillance for breast cancer as part of survivorship. Her breast history is significant for  1.) Left breast cancer diagnosed in 1995 at age 50, stage I - s/p L mastectomy, axillary node dissection (11/30/95, Dr. Fair) = per patient, 0/13 nodes - s/p chemotherapy ([facility], Dr. Valle) - s/p tamoxifen x 5 years  She has no breast complaints. She has some left arm swelling.  She c/o GARCIA/SOB due  to a COPD exacerbation for which she has not yet returned to her baseline but notes "it is improving".  She has a pacemaker and a h/o paroxysmal Afib and notes she is symptomatic when she is in Afib with weakness and GARCIA.  She has persistent LUE swelling which started about three or so years ago.  She has not had any episodes of cellulitis and is careful to monitor for any skin breaks / trauma to the arm.    Genetic testing [facility] panel : negative

## 2023-08-15 NOTE — DATA REVIEWED
[FreeTextEntry1] : Right Mammogram/sonogram  7/8//22 - heterogenously dense; no mammographic or sonographic evidence of malignancy - BIRADS 1

## 2023-08-15 NOTE — PHYSICAL EXAM
[Normocephalic] : normocephalic [Atraumatic] : atraumatic [Sclera nonicteric] : sclera nonicteric [Conjunctiva pink] : conjunctiva pink [No Supraclavicular Adenopathy] : no supraclavicular adenopathy [No Cervical Adenopathy] : no cervical adenopathy [Clear to Auscultation Bilat] : clear to auscultation bilaterally [Normal S1, S2] : normal S1 and S2 [Asymmetrical] : asymmetrical [No dominant masses] : no dominant masses in right breast  [No Nipple Retraction] : no right nipple retraction [No Nipple Discharge] : no right nipple discharge [No Axillary Lymphadenopathy] : no left axillary lymphadenopathy [Soft] : abdomen soft [No Edema] : no edema [No Swelling] : no swelling [No Rashes] : no rashes [No Ulceration] : no ulceration [de-identified] : no wheezing noted [de-identified] : transverse scar / mastectomy [de-identified] : swelling shoulder to wrist ; watch on LUE  looks constrictive - she moved this to the other side , no cellulitis, FROM

## 2023-08-23 ENCOUNTER — APPOINTMENT (OUTPATIENT)
Dept: ELECTROPHYSIOLOGY | Facility: CLINIC | Age: 78
End: 2023-08-23
Payer: MEDICARE

## 2023-08-24 ENCOUNTER — NON-APPOINTMENT (OUTPATIENT)
Age: 78
End: 2023-08-24

## 2023-08-24 PROCEDURE — 93296 REM INTERROG EVL PM/IDS: CPT

## 2023-08-24 PROCEDURE — 93294 REM INTERROG EVL PM/LDLS PM: CPT

## 2023-09-05 ENCOUNTER — RESULT REVIEW (OUTPATIENT)
Age: 78
End: 2023-09-05

## 2023-09-05 ENCOUNTER — OUTPATIENT (OUTPATIENT)
Dept: OUTPATIENT SERVICES | Facility: HOSPITAL | Age: 78
LOS: 1 days | End: 2023-09-05
Payer: MEDICARE

## 2023-09-05 ENCOUNTER — APPOINTMENT (OUTPATIENT)
Dept: ULTRASOUND IMAGING | Facility: CLINIC | Age: 78
End: 2023-09-05
Payer: MEDICARE

## 2023-09-05 ENCOUNTER — APPOINTMENT (OUTPATIENT)
Dept: MAMMOGRAPHY | Facility: CLINIC | Age: 78
End: 2023-09-05
Payer: MEDICARE

## 2023-09-05 DIAGNOSIS — Z90.49 ACQUIRED ABSENCE OF OTHER SPECIFIED PARTS OF DIGESTIVE TRACT: Chronic | ICD-10-CM

## 2023-09-05 DIAGNOSIS — Z95.0 PRESENCE OF CARDIAC PACEMAKER: Chronic | ICD-10-CM

## 2023-09-05 DIAGNOSIS — R92.2 INCONCLUSIVE MAMMOGRAM: ICD-10-CM

## 2023-09-05 DIAGNOSIS — Z00.00 ENCOUNTER FOR GENERAL ADULT MEDICAL EXAMINATION WITHOUT ABNORMAL FINDINGS: ICD-10-CM

## 2023-09-05 DIAGNOSIS — Z98.890 OTHER SPECIFIED POSTPROCEDURAL STATES: Chronic | ICD-10-CM

## 2023-09-05 DIAGNOSIS — Z85.3 PERSONAL HISTORY OF MALIGNANT NEOPLASM OF BREAST: ICD-10-CM

## 2023-09-05 DIAGNOSIS — Z90.12 ACQUIRED ABSENCE OF LEFT BREAST AND NIPPLE: Chronic | ICD-10-CM

## 2023-09-05 PROCEDURE — 77063 BREAST TOMOSYNTHESIS BI: CPT | Mod: 26,52

## 2023-09-05 PROCEDURE — 77067 SCR MAMMO BI INCL CAD: CPT

## 2023-09-05 PROCEDURE — 77063 BREAST TOMOSYNTHESIS BI: CPT

## 2023-09-05 PROCEDURE — 77067 SCR MAMMO BI INCL CAD: CPT | Mod: 26,RT,52

## 2023-09-06 ENCOUNTER — NON-APPOINTMENT (OUTPATIENT)
Age: 78
End: 2023-09-06

## 2023-09-21 ASSESSMENT — KOOS JR
TWISING OR PIVOTING ON KNEE: MODERATE
BENDING TO THE FLOOR TO PICK UP OBJECT: MILD
KOOS JR RAW SCORE: 9
RISING FROM SITTING: MODERATE
HOW SEVERE IS YOUR KNEE STIFFNESS AFTER FIRST WAKING IN MORNING: MILD
IMPORTED FORM: YES
IMPORTED KOOS JR SCORE: 9.0
STANDING UPRIGHT: MILD
GOING UP OR DOWN STAIRS: MODERATE

## 2023-10-11 ENCOUNTER — APPOINTMENT (OUTPATIENT)
Dept: ORTHOPEDIC SURGERY | Facility: CLINIC | Age: 78
End: 2023-10-11
Payer: MEDICARE

## 2023-10-11 VITALS
SYSTOLIC BLOOD PRESSURE: 129 MMHG | WEIGHT: 157 LBS | BODY MASS INDEX: 27.82 KG/M2 | HEART RATE: 85 BPM | HEIGHT: 63 IN | DIASTOLIC BLOOD PRESSURE: 78 MMHG

## 2023-10-11 PROCEDURE — 73560 X-RAY EXAM OF KNEE 1 OR 2: CPT | Mod: LT

## 2023-10-11 PROCEDURE — 99213 OFFICE O/P EST LOW 20 MIN: CPT

## 2023-10-12 DIAGNOSIS — S83.411A SPRAIN OF MEDIAL COLLATERAL LIGAMENT OF RIGHT KNEE, INITIAL ENCOUNTER: ICD-10-CM

## 2023-10-20 NOTE — DISCHARGE NOTE ADULT - HOSPITAL COURSE
pt with acute appendicitis, s/p laparascopic appendectomy. Pt remained stable throughout hospital stay and admission. Pt to follow up with private pulmonary and cardiac specialists post discharge for medical management. Pt to follow up in surgical office in 1 week for drain removal
Yes

## 2023-10-28 ENCOUNTER — OUTPATIENT (OUTPATIENT)
Dept: OUTPATIENT SERVICES | Facility: HOSPITAL | Age: 78
LOS: 1 days | End: 2023-10-28
Payer: MEDICARE

## 2023-10-28 ENCOUNTER — APPOINTMENT (OUTPATIENT)
Dept: CT IMAGING | Facility: CLINIC | Age: 78
End: 2023-10-28
Payer: MEDICARE

## 2023-10-28 DIAGNOSIS — Z90.89 ACQUIRED ABSENCE OF OTHER ORGANS: Chronic | ICD-10-CM

## 2023-10-28 DIAGNOSIS — Z00.8 ENCOUNTER FOR OTHER GENERAL EXAMINATION: ICD-10-CM

## 2023-10-28 DIAGNOSIS — Z90.12 ACQUIRED ABSENCE OF LEFT BREAST AND NIPPLE: Chronic | ICD-10-CM

## 2023-10-28 DIAGNOSIS — Z98.890 OTHER SPECIFIED POSTPROCEDURAL STATES: Chronic | ICD-10-CM

## 2023-10-28 DIAGNOSIS — Z95.0 PRESENCE OF CARDIAC PACEMAKER: Chronic | ICD-10-CM

## 2023-10-28 DIAGNOSIS — T82.110A BREAKDOWN (MECHANICAL) OF CARDIAC ELECTRODE, INITIAL ENCOUNTER: Chronic | ICD-10-CM

## 2023-10-28 DIAGNOSIS — Z90.49 ACQUIRED ABSENCE OF OTHER SPECIFIED PARTS OF DIGESTIVE TRACT: Chronic | ICD-10-CM

## 2023-10-28 PROCEDURE — 74177 CT ABD & PELVIS W/CONTRAST: CPT | Mod: 26,MH

## 2023-10-28 PROCEDURE — 74177 CT ABD & PELVIS W/CONTRAST: CPT | Mod: MH

## 2023-11-08 ENCOUNTER — APPOINTMENT (OUTPATIENT)
Dept: ORTHOPEDIC SURGERY | Facility: CLINIC | Age: 78
End: 2023-11-08
Payer: MEDICARE

## 2023-11-08 VITALS
SYSTOLIC BLOOD PRESSURE: 112 MMHG | WEIGHT: 157 LBS | HEIGHT: 63 IN | DIASTOLIC BLOOD PRESSURE: 74 MMHG | HEART RATE: 85 BPM | BODY MASS INDEX: 27.82 KG/M2

## 2023-11-08 DIAGNOSIS — Z96.652 PRESENCE OF LEFT ARTIFICIAL KNEE JOINT: ICD-10-CM

## 2023-11-08 PROCEDURE — 99213 OFFICE O/P EST LOW 20 MIN: CPT

## 2023-11-22 ENCOUNTER — NON-APPOINTMENT (OUTPATIENT)
Age: 78
End: 2023-11-22

## 2023-11-22 ENCOUNTER — APPOINTMENT (OUTPATIENT)
Dept: ELECTROPHYSIOLOGY | Facility: CLINIC | Age: 78
End: 2023-11-22
Payer: MEDICARE

## 2023-11-22 VITALS
BODY MASS INDEX: 27.82 KG/M2 | HEART RATE: 78 BPM | WEIGHT: 157 LBS | DIASTOLIC BLOOD PRESSURE: 68 MMHG | OXYGEN SATURATION: 93 % | HEIGHT: 63 IN | SYSTOLIC BLOOD PRESSURE: 121 MMHG

## 2023-11-22 DIAGNOSIS — Z95.0 PRESENCE OF CARDIAC PACEMAKER: ICD-10-CM

## 2023-11-22 DIAGNOSIS — I48.0 PAROXYSMAL ATRIAL FIBRILLATION: ICD-10-CM

## 2023-11-22 DIAGNOSIS — I49.5 SICK SINUS SYNDROME: ICD-10-CM

## 2023-11-22 PROCEDURE — 93279 PRGRMG DEV EVAL PM/LDLS PM: CPT

## 2023-11-22 RX ORDER — ZOLPIDEM TARTRATE 10 MG/1
10 TABLET, FILM COATED ORAL
Refills: 0 | Status: ACTIVE | COMMUNITY
Start: 2023-11-22

## 2023-12-07 ENCOUNTER — APPOINTMENT (OUTPATIENT)
Dept: ORTHOPEDIC SURGERY | Facility: CLINIC | Age: 78
End: 2023-12-07
Payer: MEDICARE

## 2023-12-07 VITALS
BODY MASS INDEX: 27.82 KG/M2 | SYSTOLIC BLOOD PRESSURE: 116 MMHG | DIASTOLIC BLOOD PRESSURE: 75 MMHG | HEART RATE: 88 BPM | HEIGHT: 63 IN | TEMPERATURE: 97 F | WEIGHT: 157 LBS

## 2023-12-07 DIAGNOSIS — S83.412A SPRAIN OF MEDIAL COLLATERAL LIGAMENT OF LEFT KNEE, INITIAL ENCOUNTER: ICD-10-CM

## 2023-12-07 PROCEDURE — 99213 OFFICE O/P EST LOW 20 MIN: CPT

## 2023-12-12 VITALS
BODY MASS INDEX: 27.82 KG/M2 | SYSTOLIC BLOOD PRESSURE: 116 MMHG | WEIGHT: 157 LBS | HEIGHT: 63 IN | DIASTOLIC BLOOD PRESSURE: 75 MMHG

## 2023-12-18 PROBLEM — S83.412A SPRAIN OF MEDIAL COLLATERAL LIGAMENT OF LEFT KNEE, INITIAL ENCOUNTER: Status: ACTIVE | Noted: 2023-10-12

## 2024-02-16 ENCOUNTER — APPOINTMENT (OUTPATIENT)
Dept: RADIOLOGY | Facility: CLINIC | Age: 79
End: 2024-02-16
Payer: MEDICARE

## 2024-02-16 ENCOUNTER — RESULT REVIEW (OUTPATIENT)
Age: 79
End: 2024-02-16

## 2024-02-16 ENCOUNTER — OUTPATIENT (OUTPATIENT)
Dept: OUTPATIENT SERVICES | Facility: HOSPITAL | Age: 79
LOS: 1 days | End: 2024-02-16
Payer: MEDICARE

## 2024-02-16 DIAGNOSIS — Z90.12 ACQUIRED ABSENCE OF LEFT BREAST AND NIPPLE: Chronic | ICD-10-CM

## 2024-02-16 DIAGNOSIS — Z95.0 PRESENCE OF CARDIAC PACEMAKER: Chronic | ICD-10-CM

## 2024-02-16 DIAGNOSIS — Z98.890 OTHER SPECIFIED POSTPROCEDURAL STATES: Chronic | ICD-10-CM

## 2024-02-16 DIAGNOSIS — Z00.00 ENCOUNTER FOR GENERAL ADULT MEDICAL EXAMINATION WITHOUT ABNORMAL FINDINGS: ICD-10-CM

## 2024-02-16 DIAGNOSIS — T82.110A BREAKDOWN (MECHANICAL) OF CARDIAC ELECTRODE, INITIAL ENCOUNTER: Chronic | ICD-10-CM

## 2024-02-16 DIAGNOSIS — Z90.49 ACQUIRED ABSENCE OF OTHER SPECIFIED PARTS OF DIGESTIVE TRACT: Chronic | ICD-10-CM

## 2024-02-16 DIAGNOSIS — Z90.89 ACQUIRED ABSENCE OF OTHER ORGANS: Chronic | ICD-10-CM

## 2024-02-16 PROCEDURE — 77080 DXA BONE DENSITY AXIAL: CPT | Mod: 26

## 2024-02-16 PROCEDURE — 77080 DXA BONE DENSITY AXIAL: CPT

## 2024-02-20 ENCOUNTER — APPOINTMENT (OUTPATIENT)
Dept: ELECTROPHYSIOLOGY | Facility: CLINIC | Age: 79
End: 2024-02-20
Payer: MEDICARE

## 2024-02-21 ENCOUNTER — NON-APPOINTMENT (OUTPATIENT)
Age: 79
End: 2024-02-21

## 2024-02-21 PROCEDURE — 93294 REM INTERROG EVL PM/LDLS PM: CPT

## 2024-02-21 PROCEDURE — 93296 REM INTERROG EVL PM/IDS: CPT

## 2024-02-26 ENCOUNTER — APPOINTMENT (OUTPATIENT)
Dept: PHYSICAL MEDICINE AND REHAB | Facility: CLINIC | Age: 79
End: 2024-02-26
Payer: MEDICARE

## 2024-02-26 DIAGNOSIS — R60.9 EDEMA, UNSPECIFIED: ICD-10-CM

## 2024-02-26 PROCEDURE — 99204 OFFICE O/P NEW MOD 45 MIN: CPT

## 2024-02-26 NOTE — END OF VISIT
[] : Fellow [FreeTextEntry3] : I have personally seen and examined the patient. I fully participated in the care of this patient. I have made amendments to the documentation where necessary, and agree with the history, physical exam, and plan as documented by the Fellow, Dr. Sanchez.

## 2024-02-26 NOTE — PHYSICAL EXAM
[FreeTextEntry1] : Gen: Patient is A&O x 3, NAD HEENT: EOMI, hearing grossly normal Resp: regular, non - labored CV: pulses regular Skin: no rashes, erythema Lymph: no clubbing, cyanosis, + LUE edema Inspection: no instability ROM: full throughout Palpation:no tenderness to palpation Sensation: Decreased to light touch in right 1-st-3rd fingers Reflexes: 1+ and symmetric throughout Strength: 5/5 throughout Special tests: -Min. -Tinel's. -Phalen's. -Spurling. -Cervical compression  Gait: normal, non-antalgic  Tape Measurements were done: Right upper extremity: 15 cm above olecranon: 32 cm 10 cm above olecranon: 31 cm  10 cm below olecranon: 24 cm 15 cm below olecranon: 20 cm  Wrist: 16 cm  Dorsum of Hand: 19 cm Base of 2nd digit: 6.8 cm  Left upper extremity:  15 cm above olecranon: 37 cm  10 cm above olecranon: 36 cm  10 cm below olecranon: 24 cm 15 cm below olecranon: 21 cm  Wrist: 16.8 cm  Dorsum of Hand: 18.8 cm  Base of 2nd digit: 6 cm

## 2024-02-26 NOTE — ASSESSMENT
[FreeTextEntry1] : 78 year old female here for evaluation.  #LUE lymphedema -Obtain LUE ultrasound to rule out DVT -Lymphedema education provided -Tape Measurements done and increased measurements on the LUE -Start PT/CDT -Case discussed with Ekaterina Mondragon-PT, start MLD, plan to obtain compression garments   #Right finger numbness -Possibly secondary to carpal tunnel syndrome -Referral given for an EMG/NCS -Continue wrist splints  -Follow-up in 6 weeks

## 2024-02-26 NOTE — HISTORY OF PRESENT ILLNESS
[FreeTextEntry1] : Ms. Bright is a 78-year-old right-handed female with history of Left breast cancer diagnosed in 1995 at age 50, stage I, s/p L mastectomy, axillary node dissection (11/30/95, Dr. Fair) = per patient, 0/13 nodes.  S/p chemotherapy ([facility], Dr. Valle).  S/p tamoxifen x 5 years.   She developed left upper extremity swelling and intermittent discomfort around 5 years ago. It is hard for her to describe how the discomfort feels. The discomfort mostly arises at night and aggressive massaging relieves the pain. She never had physical therapy or lymphedema therapy. She never wore a compression garment or used a pneumatic pump.   She used to go to the gym but stopped last year due to her emphysema. She hopes to return to the gym soon. She previously did pulmonary rehab which she found helpful. She was offered to do it again but would like to hold off for now.   She has right 1st-3rd finger numbness and sometimes wakes up with bilateral hand pain. She wears hand braces occasionally at night which helps. She is scheduled to see a Neurologist in November 2024. Denies ever getting an EMG/NCS or a steroid injection in the wrist.   No pain radiation from neck.  No bowel/bladder dysfunction.

## 2024-02-27 ENCOUNTER — APPOINTMENT (OUTPATIENT)
Dept: PHYSICAL MEDICINE AND REHAB | Facility: CLINIC | Age: 79
End: 2024-02-27
Payer: MEDICARE

## 2024-02-27 VITALS
SYSTOLIC BLOOD PRESSURE: 122 MMHG | DIASTOLIC BLOOD PRESSURE: 78 MMHG | HEIGHT: 63 IN | BODY MASS INDEX: 27.46 KG/M2 | HEART RATE: 86 BPM | RESPIRATION RATE: 12 BRPM | WEIGHT: 155 LBS

## 2024-02-27 PROCEDURE — 95909 NRV CNDJ TST 5-6 STUDIES: CPT

## 2024-02-27 PROCEDURE — 95885 MUSC TST DONE W/NERV TST LIM: CPT

## 2024-02-27 NOTE — PROCEDURE
[de-identified] : PRE-PROCEDURE  * Was H&P completed and in chart at time of procedure ?  YES * Were the indications for the procedure appropriate ?  YES * Were the practitioner's entries in the patient's medical record appropriate ?  YES  PROCEDURE * Did the practitioner maintain proper sterile technique ?  YES * If bleeding was encountered, did the practitioner manage it appropriately?  YES * Were complications, if any, recognized and managed appropriately?  YES * Was the practitioner's use of diagnostic services (e.g., lab, x-ray, MRI, CT) appropriate?  YES  POST-PROCEDURE * Did the pre-procedure diagnosis coincide with the findings of the procedure?  YES * Was the procedure report complete, accurate and timely?  YES

## 2024-02-27 NOTE — ASSESSMENT
[FreeTextEntry1] : EMG/NCS RIGHT UE performed at today's office visit.  EDX findings significant for a chronic, relatively severe, axonal, sensorimotor, median mononeuropathy across the right wrist.  There is no electrodiagnostic evidence of a concomitant ulnar mononeuropathy across the right elbow.  Full report to follow.

## 2024-03-06 ENCOUNTER — APPOINTMENT (OUTPATIENT)
Dept: PHYSICAL MEDICINE AND REHAB | Facility: CLINIC | Age: 79
End: 2024-03-06
Payer: MEDICARE

## 2024-03-06 VITALS
BODY MASS INDEX: 27.46 KG/M2 | WEIGHT: 155 LBS | HEART RATE: 91 BPM | SYSTOLIC BLOOD PRESSURE: 115 MMHG | DIASTOLIC BLOOD PRESSURE: 70 MMHG | HEIGHT: 63 IN

## 2024-03-06 PROCEDURE — 20526 THER INJECTION CARP TUNNEL: CPT | Mod: RT

## 2024-03-06 PROCEDURE — 76942 ECHO GUIDE FOR BIOPSY: CPT

## 2024-03-06 PROCEDURE — 99214 OFFICE O/P EST MOD 30 MIN: CPT | Mod: 25

## 2024-03-06 NOTE — HISTORY OF PRESENT ILLNESS
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  The patient endorses a history of numbness and tingling in the first 3 digits of her right hand.  She denies neck pain or pain radiating down her right arm.  She denies numbness or tingling in her left hand or feet.  She has no history of diabetes.

## 2024-03-06 NOTE — ASSESSMENT
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  I spent most of today's office visit (30 min) reviewing the patient's EMG, reviewing and performing the MSK US examination right median nerve and hydrodissection, discussing etiology, pathogenesis and further non-operative vs. operative management.  EDX findings significant for a chronic, relatively severe, axonal, sensorimotor, median mononeuropathy across the right wrist. There was no electrodiagnostic evidence of a concomitant ulnar mononeuropathy across the right elbow.  The patient tolerated today's hydrodissection procedure quite well without adverse effects.  Depending upon how long the patient experiences symptomatic relief, the patient understands that we may need to consult Ortho Hand MD for consideration of formal carpal tunnel release surgery as there are no good non-operative management measures for advanced CTS.  I advised the patient to wear the static wrist splints at night.  Pt. is in agreement with plan.  All questions answered.  RTC 4 weeks.

## 2024-03-06 NOTE — PROCEDURE
[de-identified] : Reason for procedure: CARPAL TUNNEL SYNDROME  Procedure:  1. RIGHT MEDIAN NERVE HYDRODISSECTION AT CARPAL TUNNEL 2. US GUIDANCE  Physician: ROSALINA CRUZ D.O. Medication injected: 6 cc D5W + 1 cc Lidocaine 1% + 1 cc Dexamethasone (total)  Sedation medications: None Estimated blood loss: None Complications: None  Technique: R/B/A to US guided right median nerve hydrodissection at the carpal tunnel reviewed with patient.  Patient is agreeable and wishes to proceed.  Signed consent form to be scanned into EMR.  The patient was placed in seated position with the wrist hyperextended and supinated. Ultrasound evaluation demonstrated the a mixed hyper/hypoechoic structure with "honeycomb" appearance consistent with the median nerve within the carpal tunnel, overlying tendons and vasculature. The area was prepped in normal sterile fashion with Chloroprep x3.  A 25 gauge, 2.0 inch needle was advanced toward and just superficial to the hyper/hypoechoic structure with US guidance using a gel standoff.  After negative aspiration of heme, the above medications were injected around the nerve again with ultrasound guidance.  The needle was then directed deep to the structure.  Hydrodissection was performed with the above medications. Needle was then removed and a bandaid placed over injection site. There were no complications.   The patient was provided with post injection instructions and noted improvement in pain after injection.   CSA RIGHT MEDIAN NERVE AT CARPAL INLET MEASURED 19 mm2 (markedly enlarged) CSA RIGHT MEDIAN NERVE AT CARPAL TUNNEL OUTLET MEASURED 13 mm2 (relatively constricted) RIGHT MEDIAN NERVE LONGITUDINAL ACROSS CARPAL TUNNEL: 32 mm -> 26 mm2 at notch

## 2024-03-06 NOTE — DATA REVIEWED
[EMG] : EMG [FreeTextEntry1] : EMG/NCS RIGHT UE (2/27/24): EDX findings significant for a chronic, relatively severe, axonal, sensorimotor, median mononeuropathy across the right wrist. There is no electrodiagnostic evidence of a concomitant ulnar mononeuropathy across the right elbow.

## 2024-03-07 ENCOUNTER — NON-APPOINTMENT (OUTPATIENT)
Age: 79
End: 2024-03-07

## 2024-04-08 ENCOUNTER — APPOINTMENT (OUTPATIENT)
Dept: PHYSICAL MEDICINE AND REHAB | Facility: CLINIC | Age: 79
End: 2024-04-08
Payer: MEDICARE

## 2024-04-08 VITALS
HEART RATE: 83 BPM | WEIGHT: 155 LBS | HEIGHT: 63 IN | BODY MASS INDEX: 27.46 KG/M2 | SYSTOLIC BLOOD PRESSURE: 112 MMHG | DIASTOLIC BLOOD PRESSURE: 72 MMHG

## 2024-04-08 PROCEDURE — 99214 OFFICE O/P EST MOD 30 MIN: CPT

## 2024-04-08 NOTE — DATA REVIEWED
[EMG] : EMG [FreeTextEntry1] : PRE-PROCEDURAL SCANNING (3/7/24)  CSA RIGHT MEDIAN NERVE AT CARPAL INLET MEASURED 19 mm2 (markedly enlarged) CSA RIGHT MEDIAN NERVE AT CARPAL TUNNEL OUTLET MEASURED 13 mm2 (relatively constricted) RIGHT MEDIAN NERVE LONGITUDINAL ACROSS CARPAL TUNNEL: 32 mm -> 26 mm2 at notch  EMG/NCS RIGHT UE (2/27/24): EDX findings significant for a chronic, relatively severe, axonal, sensorimotor, median mononeuropathy across the right wrist. There is no electrodiagnostic evidence of a concomitant ulnar mononeuropathy across the right elbow.

## 2024-04-08 NOTE — ASSESSMENT
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  I spent most of today's office visit (30 min) re-reviewing the patient's EMG, US guided right median nerve hydrodissection, discussing etiology, pathogenesis and further non-operative vs. operative management.  Unfortunately, the patient did not respond to the hydrodissection.  Upon review of her ultrasound images, I was satisfied with the flow of injectate around the median nerve.  I explained that her median neuropathy is quite advanced given the dropped CMAP amplitudes and absent sensory responses and may not even respond to surgical resection of the transverse carpal ligament; however, I would like her to obtain the opinion of an orthopedic hand surgeon.  I have referred her to my colleague Dr. Shavonne Castillo. We may consider repeating the hydrodissection this time adding dexamethasone to the D5W but this would be more of a palliative measure.  I advised the patient to wear the static wrist splints at night.  Pt. is in agreement with plan.  All questions answered.  RTC after the above.

## 2024-04-08 NOTE — HISTORY OF PRESENT ILLNESS
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  Pt. reports no significant relief post procedure.  Static wrist splint has been somewhat effective in controlling her wrist/hand pain but still has chronic numbness in the fingertips.  She is still in P.T. for the lymphedema in her left arm.

## 2024-04-15 ENCOUNTER — APPOINTMENT (OUTPATIENT)
Dept: PHYSICAL MEDICINE AND REHAB | Facility: CLINIC | Age: 79
End: 2024-04-15
Payer: MEDICARE

## 2024-04-15 VITALS — BODY MASS INDEX: 27.11 KG/M2 | WEIGHT: 153 LBS | HEIGHT: 63 IN

## 2024-04-15 DIAGNOSIS — G56.00 CARPAL TUNNEL SYNDROME, UNSPECIFIED UPPER LIMB: ICD-10-CM

## 2024-04-15 PROCEDURE — 99213 OFFICE O/P EST LOW 20 MIN: CPT

## 2024-04-15 NOTE — END OF VISIT
[] : Resident [FreeTextEntry3] : I have personally seen and examined the patient. I fully participated in the care of this patient. I have made amendments to the documentation where necessary, and agree with the history, physical exam, and plan as documented by the Resident, Dr. Peters.

## 2024-04-15 NOTE — PHYSICAL EXAM
[FreeTextEntry1] : Gen: Patient is A&O x 3, NAD HEENT: EOMI, hearing grossly normal Resp: regular, non - labored CV: pulses regular Skin: scattered bruising in the LUE Lymph: no clubbing, cyanosis, + LUE edema Inspection: no instability ROM: full throughout Palpation:no tenderness to palpation Sensation: Decreased to light touch in right 1-st-3rd fingers Reflexes: 1+ and symmetric throughout Strength: 5/5 throughout Special tests: -Tinel's, -Hughes Gait: normal, non-antalgic  Tape Measurements were done:  Left upper extremity:  15 cm above olecranon: 33.5 cm, 37 cm  10 cm above olecranon: 32 cm, 36 cm  10 cm below olecranon: 22 cm, 24 cm 15 cm below olecranon: 19 cm, 21 cm  Wrist: 16 cm, 16.8 cm  Dorsum of Hand: 17.5 cm, 18.8 cm  Base of 2nd digit: 6 cm, 6 cm

## 2024-04-15 NOTE — HISTORY OF PRESENT ILLNESS
[FreeTextEntry1] : Ms. Bright is a 78-year-old right-handed female with history of Left breast cancer diagnosed in 1995 at age 50, stage I, s/p L mastectomy, axillary node dissection (11/30/95, Dr. Fair) = per patient, 0/13 nodes.  S/p chemotherapy ([facility], Dr. Valle).  S/p tamoxifen x 5 years.  She developed left upper extremity swelling and intermittent discomfort around 5 years ago. It is hard for her to describe how the discomfort feels. The discomfort mostly arises at night and aggressive massaging relieves the pain. She never had physical therapy or lymphedema therapy. She never wore a compression garment or used a pneumatic pump.   She used to go to the gym but stopped last year due to her emphysema. She hopes to return to the gym soon. She previously did pulmonary rehab which she found helpful. She was offered to do it again but would like to hold off for now.   She has right 1st-3rd finger numbness and sometimes wakes up with bilateral hand pain. She wears hand braces occasionally at night which helps. She is scheduled to see a Neurologist in November 2024. Denies ever getting an EMG/NCS or a steroid injection in the wrist.   No pain radiation from neck.  No bowel/bladder dysfunction.    Interval history: Since last visit patient saw Dr. Charles and found to have carpal tunnel syndrome and underwent with hydrodissection injection with no relief and plans to follow up with hand surgery this week.  Overall she reports improvement in LUE swelling.  She completed her lymphedema therapy sessions with Ekaterina last week.  She is going for measurements today for her compression garment.  Denies any pain in the LUE, erythema or increased warmth.

## 2024-04-15 NOTE — ASSESSMENT
[FreeTextEntry1] : 79 year old female here for evaluation.  #LUE lymphedema: -Completed CDT -Reviewed LUE US, negative for DVT -Continue to obtain compression sleeve and night time garment -Discussed pneumatic compression pump, she will consider  -Monitor   #Right Carpal Tunnel Syndrome: -S/P EMG -FU with hand surgery -Continue wrist splints  -Follow-up in 6 weeks

## 2024-04-15 NOTE — REVIEW OF SYSTEMS
[Easy Bruising] : a tendency for easy bruising [Negative] : Integumentary [de-identified] : right D1-3 finger numbness [de-identified] : left upper extremity edema

## 2024-05-08 ENCOUNTER — OUTPATIENT (OUTPATIENT)
Dept: OUTPATIENT SERVICES | Facility: HOSPITAL | Age: 79
LOS: 1 days | End: 2024-05-08
Payer: MEDICARE

## 2024-05-08 VITALS
HEIGHT: 63 IN | HEART RATE: 70 BPM | SYSTOLIC BLOOD PRESSURE: 103 MMHG | OXYGEN SATURATION: 95 % | TEMPERATURE: 98 F | RESPIRATION RATE: 16 BRPM | WEIGHT: 149.91 LBS | DIASTOLIC BLOOD PRESSURE: 57 MMHG

## 2024-05-08 DIAGNOSIS — T82.110A BREAKDOWN (MECHANICAL) OF CARDIAC ELECTRODE, INITIAL ENCOUNTER: Chronic | ICD-10-CM

## 2024-05-08 DIAGNOSIS — Z01.818 ENCOUNTER FOR OTHER PREPROCEDURAL EXAMINATION: ICD-10-CM

## 2024-05-08 DIAGNOSIS — Z96.652 PRESENCE OF LEFT ARTIFICIAL KNEE JOINT: Chronic | ICD-10-CM

## 2024-05-08 DIAGNOSIS — Z90.89 ACQUIRED ABSENCE OF OTHER ORGANS: Chronic | ICD-10-CM

## 2024-05-08 DIAGNOSIS — Z90.49 ACQUIRED ABSENCE OF OTHER SPECIFIED PARTS OF DIGESTIVE TRACT: Chronic | ICD-10-CM

## 2024-05-08 DIAGNOSIS — Z95.0 PRESENCE OF CARDIAC PACEMAKER: Chronic | ICD-10-CM

## 2024-05-08 DIAGNOSIS — Z90.12 ACQUIRED ABSENCE OF LEFT BREAST AND NIPPLE: Chronic | ICD-10-CM

## 2024-05-08 DIAGNOSIS — Z98.890 OTHER SPECIFIED POSTPROCEDURAL STATES: Chronic | ICD-10-CM

## 2024-05-08 DIAGNOSIS — G56.01 CARPAL TUNNEL SYNDROME, RIGHT UPPER LIMB: ICD-10-CM

## 2024-05-08 LAB
A1C WITH ESTIMATED AVERAGE GLUCOSE RESULT: 5.8 % — HIGH (ref 4–5.6)
ANION GAP SERPL CALC-SCNC: 4 MMOL/L — LOW (ref 5–17)
APPEARANCE UR: CLEAR — SIGNIFICANT CHANGE UP
APTT BLD: 41.7 SEC — HIGH (ref 24.5–35.6)
BACTERIA # UR AUTO: NEGATIVE /HPF — SIGNIFICANT CHANGE UP
BASOPHILS # BLD AUTO: 0.09 K/UL — SIGNIFICANT CHANGE UP (ref 0–0.2)
BASOPHILS NFR BLD AUTO: 1.4 % — SIGNIFICANT CHANGE UP (ref 0–2)
BILIRUB UR-MCNC: NEGATIVE — SIGNIFICANT CHANGE UP
BLD GP AB SCN SERPL QL: SIGNIFICANT CHANGE UP
BUN SERPL-MCNC: 33 MG/DL — HIGH (ref 7–23)
CALCIUM SERPL-MCNC: 9.7 MG/DL — SIGNIFICANT CHANGE UP (ref 8.5–10.1)
CAST: 0 /LPF — SIGNIFICANT CHANGE UP (ref 0–4)
CHLORIDE SERPL-SCNC: 109 MMOL/L — HIGH (ref 96–108)
CO2 SERPL-SCNC: 26 MMOL/L — SIGNIFICANT CHANGE UP (ref 22–31)
COLOR SPEC: YELLOW — SIGNIFICANT CHANGE UP
CREAT SERPL-MCNC: 1.18 MG/DL — SIGNIFICANT CHANGE UP (ref 0.5–1.3)
DIFF PNL FLD: NEGATIVE — SIGNIFICANT CHANGE UP
EGFR: 47 ML/MIN/1.73M2 — LOW
EOSINOPHIL # BLD AUTO: 0.21 K/UL — SIGNIFICANT CHANGE UP (ref 0–0.5)
EOSINOPHIL NFR BLD AUTO: 3.2 % — SIGNIFICANT CHANGE UP (ref 0–6)
ESTIMATED AVERAGE GLUCOSE: 120 MG/DL — HIGH (ref 68–114)
GLUCOSE SERPL-MCNC: 111 MG/DL — HIGH (ref 70–99)
GLUCOSE UR QL: NEGATIVE MG/DL — SIGNIFICANT CHANGE UP
HCT VFR BLD CALC: 47.3 % — HIGH (ref 34.5–45)
HCV AB S/CO SERPL IA: 0.06 S/CO — SIGNIFICANT CHANGE UP (ref 0–0.99)
HCV AB SERPL-IMP: SIGNIFICANT CHANGE UP
HGB BLD-MCNC: 15.9 G/DL — HIGH (ref 11.5–15.5)
IMM GRANULOCYTES NFR BLD AUTO: 0.3 % — SIGNIFICANT CHANGE UP (ref 0–0.9)
INR BLD: 1.36 RATIO — HIGH (ref 0.85–1.18)
KETONES UR-MCNC: NEGATIVE MG/DL — SIGNIFICANT CHANGE UP
LEUKOCYTE ESTERASE UR-ACNC: ABNORMAL
LYMPHOCYTES # BLD AUTO: 1.49 K/UL — SIGNIFICANT CHANGE UP (ref 1–3.3)
LYMPHOCYTES # BLD AUTO: 22.9 % — SIGNIFICANT CHANGE UP (ref 13–44)
MCHC RBC-ENTMCNC: 31.7 PG — SIGNIFICANT CHANGE UP (ref 27–34)
MCHC RBC-ENTMCNC: 33.6 GM/DL — SIGNIFICANT CHANGE UP (ref 32–36)
MCV RBC AUTO: 94.4 FL — SIGNIFICANT CHANGE UP (ref 80–100)
MONOCYTES # BLD AUTO: 1.1 K/UL — HIGH (ref 0–0.9)
MONOCYTES NFR BLD AUTO: 16.9 % — HIGH (ref 2–14)
NEUTROPHILS # BLD AUTO: 3.59 K/UL — SIGNIFICANT CHANGE UP (ref 1.8–7.4)
NEUTROPHILS NFR BLD AUTO: 55.3 % — SIGNIFICANT CHANGE UP (ref 43–77)
NITRITE UR-MCNC: NEGATIVE — SIGNIFICANT CHANGE UP
PH UR: 6 — SIGNIFICANT CHANGE UP (ref 5–8)
PLATELET # BLD AUTO: 235 K/UL — SIGNIFICANT CHANGE UP (ref 150–400)
POTASSIUM SERPL-MCNC: 4.1 MMOL/L — SIGNIFICANT CHANGE UP (ref 3.5–5.3)
POTASSIUM SERPL-SCNC: 4.1 MMOL/L — SIGNIFICANT CHANGE UP (ref 3.5–5.3)
PROT UR-MCNC: NEGATIVE MG/DL — SIGNIFICANT CHANGE UP
PROTHROM AB SERPL-ACNC: 15.2 SEC — HIGH (ref 9.5–13)
RBC # BLD: 5.01 M/UL — SIGNIFICANT CHANGE UP (ref 3.8–5.2)
RBC # FLD: 13.4 % — SIGNIFICANT CHANGE UP (ref 10.3–14.5)
RBC CASTS # UR COMP ASSIST: 0 /HPF — SIGNIFICANT CHANGE UP (ref 0–4)
SODIUM SERPL-SCNC: 139 MMOL/L — SIGNIFICANT CHANGE UP (ref 135–145)
SP GR SPEC: 1.02 — SIGNIFICANT CHANGE UP (ref 1–1.03)
SQUAMOUS # UR AUTO: 0 /HPF — SIGNIFICANT CHANGE UP (ref 0–5)
UROBILINOGEN FLD QL: 1 MG/DL — SIGNIFICANT CHANGE UP (ref 0.2–1)
WBC # BLD: 6.5 K/UL — SIGNIFICANT CHANGE UP (ref 3.8–10.5)
WBC # FLD AUTO: 6.5 K/UL — SIGNIFICANT CHANGE UP (ref 3.8–10.5)
WBC UR QL: 2 /HPF — SIGNIFICANT CHANGE UP (ref 0–5)

## 2024-05-08 PROCEDURE — 83036 HEMOGLOBIN GLYCOSYLATED A1C: CPT

## 2024-05-08 PROCEDURE — 99214 OFFICE O/P EST MOD 30 MIN: CPT | Mod: 25

## 2024-05-08 PROCEDURE — 85025 COMPLETE CBC W/AUTO DIFF WBC: CPT

## 2024-05-08 PROCEDURE — 86901 BLOOD TYPING SEROLOGIC RH(D): CPT

## 2024-05-08 PROCEDURE — 80048 BASIC METABOLIC PNL TOTAL CA: CPT

## 2024-05-08 PROCEDURE — 86900 BLOOD TYPING SEROLOGIC ABO: CPT

## 2024-05-08 PROCEDURE — 93005 ELECTROCARDIOGRAM TRACING: CPT

## 2024-05-08 PROCEDURE — 86850 RBC ANTIBODY SCREEN: CPT

## 2024-05-08 PROCEDURE — 85610 PROTHROMBIN TIME: CPT

## 2024-05-08 PROCEDURE — 93010 ELECTROCARDIOGRAM REPORT: CPT

## 2024-05-08 PROCEDURE — 81001 URINALYSIS AUTO W/SCOPE: CPT

## 2024-05-08 PROCEDURE — 85730 THROMBOPLASTIN TIME PARTIAL: CPT

## 2024-05-08 PROCEDURE — 36415 COLL VENOUS BLD VENIPUNCTURE: CPT

## 2024-05-08 PROCEDURE — 86803 HEPATITIS C AB TEST: CPT

## 2024-05-08 RX ORDER — FLUTICASONE PROPIONATE AND SALMETEROL 50; 250 UG/1; UG/1
0 POWDER ORAL; RESPIRATORY (INHALATION)
Qty: 0 | Refills: 0 | DISCHARGE

## 2024-05-08 NOTE — H&P PST ADULT - CARDIOVASCULAR COMMENTS
preop evaluation done with CURTIS Okeefe (both PCP /Cardiology) --hx Tachy/Allen sydrome, PAF, Medtronic PM, HTN, Hyperlipidemia

## 2024-05-08 NOTE — H&P PST ADULT - HEMATOLOGY/LYMPHATICS COMMENTS
on Eliquis; Hx left breast cancer 1995, left mastectomy, chemo, Tamoxifen x5yrs; Fatty liver, fatty deposit on outside of pancreas

## 2024-05-08 NOTE — H&P PST ADULT - NSICDXPASTMEDICALHX_GEN_ALL_CORE_FT
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    Epistaxis 6/2022    Erythrocytosis Chronic, compensatory - mild    Fatty liver     Former smoker     History of ETOH abuse last drink 1984    Hyperlipidemia, unspecified hyperlipidemia type     Hypertension     Left knee pain     Lymph edema LUE    Malignant neoplasm of left female breast, unspecified estrogen receptor status, unspecified site of breast 1995 , h/o chemo    Melanoma back    On home oxygen therapy 2L as needed    Osteoarthritis both knees L>R    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    Epistaxis 6/2022    Erythrocytosis Chronic, compensatory - mild    Fatty liver     Former smoker     Hiatal hernia     History of ETOH abuse last drink 1984    Hyperlipidemia, unspecified hyperlipidemia type     Hypertension     Left knee pain     Lymph edema LUE    Malignant neoplasm of left female breast, unspecified estrogen receptor status, unspecified site of breast 1995 , h/o chemo    Melanoma back    On home oxygen therapy 2L as needed    Osteoarthritis both knees L>R    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    Pneumonitis     Sebaceous cyst     Thyroid nodule      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    Epistaxis 6/2022    Erythrocytosis Chronic, compensatory - mild    Fatty liver     Former smoker     Hiatal hernia     History of ETOH abuse last drink 1984    Hyperlipidemia, unspecified hyperlipidemia type     Hypertension     Left knee pain     Lymph edema LUE    Malignant neoplasm of left female breast, unspecified estrogen receptor status, unspecified site of breast 1995 , h/o chemo    Melanoma back    On home oxygen therapy 2L as needed    Osteoarthritis both knees L>R    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

## 2024-05-08 NOTE — H&P PST ADULT - HISTORY OF PRESENT ILLNESS
[age] y.o WD, WN female presents to PST with right hand carpal tunnel. She reports numbness/tingling right hand middle, index and thumb for sometime. The symptoms worsened with pain in the hand. She followed with neurology diagnostics revealing carpal tunnel syndrome right hand.  Her hx is significant for COPD/Emphysema, PAF, Tachy/Allen syndrome, Medtronic PM, HTN, Hyperlipidemia, and left breast cancer, left arm lymphedema. She has followed with surgeon and scheduled for Right Wrist Carpal Tunnel Release

## 2024-05-08 NOTE — H&P PST ADULT - ASSESSMENT
79 y.o female scheduled for Right Wrist Carpal Tunnel Release   Plan  1. Stop all NSAIDS, herbal supplements and vitamins for 7 days.  2. NPO at midnight.  3. Take the following medications ( ) with small sips of water on the morning of your procedure/surgery.  4. Use EZ sponges as directed  5. Labs, EKG as per surgeon  6. PMD visit for optimization prior to surgery as per surgeon       79 y.o female scheduled for Right Wrist Carpal Tunnel Release   Plan  1. Stop all NSAIDS, herbal supplements and vitamins for 7 days. Eliquis - follow directions from Dr Okeefe   2. NPO at midnight.  3. Take the following medications Cardizem, Toprol, Benazepril  with small sips of water on the morning of your procedure/surgery. Use inhalers per normal routine   4. Use EZ sponges as directed  5. Labs, EKG as per surgeon  6. PMD/Cardiology CURTIS Okeefe  visit for optimization prior to surgery as per surgeon  7. Preprocedure education provided

## 2024-05-08 NOTE — H&P PST ADULT - NSICDXPASTSURGICALHX_GEN_ALL_CORE_FT
PAST SURGICAL HISTORY:  Artificial pacemaker Medtronic Model [id number]  initially 7/28/2009,  generator change 9/30/2020 ,    H/O mastectomy, left 1995 axillary node dissection    History of appendectomy 2018    History of arthroscopy of left knee 2007    History of tonsillectomy     Pacemaker lead fracture replace atrial only 8/2021     PAST SURGICAL HISTORY:  Artificial pacemaker Medtronic Model [id number]  initially 7/28/2009,  generator change 9/30/2020 ,    H/O colonoscopy     H/O mastectomy, left 1995 axillary node dissection    H/O melanoma excision     H/O total knee replacement, left     History of appendectomy 2018    History of arthroscopy of left knee 2007    History of tonsillectomy     Pacemaker lead fracture replace atrial only 8/2021

## 2024-05-09 DIAGNOSIS — Z01.818 ENCOUNTER FOR OTHER PREPROCEDURAL EXAMINATION: ICD-10-CM

## 2024-05-09 DIAGNOSIS — G56.01 CARPAL TUNNEL SYNDROME, RIGHT UPPER LIMB: ICD-10-CM

## 2024-05-16 ENCOUNTER — OUTPATIENT (OUTPATIENT)
Dept: INPATIENT UNIT | Facility: HOSPITAL | Age: 79
LOS: 1 days | Discharge: HOME | End: 2024-05-16

## 2024-05-16 ENCOUNTER — TRANSCRIPTION ENCOUNTER (OUTPATIENT)
Age: 79
End: 2024-05-16

## 2024-05-16 VITALS
HEART RATE: 79 BPM | TEMPERATURE: 97 F | DIASTOLIC BLOOD PRESSURE: 90 MMHG | RESPIRATION RATE: 27 BRPM | HEIGHT: 63 IN | WEIGHT: 149.91 LBS | SYSTOLIC BLOOD PRESSURE: 146 MMHG | OXYGEN SATURATION: 94 %

## 2024-05-16 VITALS
SYSTOLIC BLOOD PRESSURE: 100 MMHG | RESPIRATION RATE: 16 BRPM | HEART RATE: 71 BPM | DIASTOLIC BLOOD PRESSURE: 55 MMHG | OXYGEN SATURATION: 95 %

## 2024-05-16 DIAGNOSIS — Z98.890 OTHER SPECIFIED POSTPROCEDURAL STATES: Chronic | ICD-10-CM

## 2024-05-16 DIAGNOSIS — Z95.0 PRESENCE OF CARDIAC PACEMAKER: Chronic | ICD-10-CM

## 2024-05-16 DIAGNOSIS — Z96.652 PRESENCE OF LEFT ARTIFICIAL KNEE JOINT: Chronic | ICD-10-CM

## 2024-05-16 DIAGNOSIS — T82.110A BREAKDOWN (MECHANICAL) OF CARDIAC ELECTRODE, INITIAL ENCOUNTER: Chronic | ICD-10-CM

## 2024-05-16 DIAGNOSIS — G56.01 CARPAL TUNNEL SYNDROME, RIGHT UPPER LIMB: ICD-10-CM

## 2024-05-16 DIAGNOSIS — Z90.49 ACQUIRED ABSENCE OF OTHER SPECIFIED PARTS OF DIGESTIVE TRACT: Chronic | ICD-10-CM

## 2024-05-16 DIAGNOSIS — Z90.12 ACQUIRED ABSENCE OF LEFT BREAST AND NIPPLE: Chronic | ICD-10-CM

## 2024-05-16 DIAGNOSIS — Z90.89 ACQUIRED ABSENCE OF OTHER ORGANS: Chronic | ICD-10-CM

## 2024-05-16 RX ORDER — UBIDECARENONE 100 MG
300 CAPSULE ORAL
Refills: 0 | DISCHARGE

## 2024-05-16 RX ORDER — ROSUVASTATIN CALCIUM 5 MG/1
1 TABLET ORAL
Qty: 0 | Refills: 0 | DISCHARGE

## 2024-05-16 RX ORDER — HYDROCHLOROTHIAZIDE 25 MG
1 TABLET ORAL
Qty: 0 | Refills: 0 | DISCHARGE

## 2024-05-16 RX ORDER — APIXABAN 2.5 MG/1
1 TABLET, FILM COATED ORAL
Refills: 0 | DISCHARGE

## 2024-05-16 RX ORDER — TRAMADOL HYDROCHLORIDE 50 MG/1
1 TABLET ORAL
Qty: 6 | Refills: 0
Start: 2024-05-16

## 2024-05-16 RX ORDER — OMEGA-3 ACID ETHYL ESTERS 1 G
0 CAPSULE ORAL
Qty: 0 | Refills: 0 | DISCHARGE

## 2024-05-16 RX ORDER — FLUTICASONE PROPIONATE AND SALMETEROL 50; 250 UG/1; UG/1
2 POWDER ORAL; RESPIRATORY (INHALATION)
Refills: 0 | DISCHARGE

## 2024-05-16 RX ORDER — UMECLIDINIUM 62.5 UG/1
1 AEROSOL, POWDER ORAL
Qty: 0 | Refills: 0 | DISCHARGE

## 2024-05-16 RX ORDER — BENAZEPRIL HYDROCHLORIDE 40 MG/1
1 TABLET ORAL
Qty: 0 | Refills: 0 | DISCHARGE

## 2024-05-16 RX ORDER — DILTIAZEM HCL 120 MG
1 CAPSULE, EXT RELEASE 24 HR ORAL
Qty: 0 | Refills: 0 | DISCHARGE

## 2024-05-16 RX ORDER — ALBUTEROL 90 UG/1
2 AEROSOL, METERED ORAL
Qty: 0 | Refills: 0 | DISCHARGE

## 2024-05-16 RX ORDER — METOPROLOL TARTRATE 50 MG
1 TABLET ORAL
Qty: 0 | Refills: 0 | DISCHARGE

## 2024-05-16 RX ORDER — CHOLECALCIFEROL (VITAMIN D3) 125 MCG
2 CAPSULE ORAL
Qty: 0 | Refills: 0 | DISCHARGE

## 2024-05-16 RX ORDER — IPRATROPIUM/ALBUTEROL SULFATE 18-103MCG
3 AEROSOL WITH ADAPTER (GRAM) INHALATION
Refills: 0 | DISCHARGE

## 2024-05-16 NOTE — ASU PATIENT PROFILE, ADULT - NS PRO AD PATIENT TYPE
----- Message from Maribel Tadeo NP sent at 9/7/2017  2:41 PM CDT -----  Stress test normal.  NO change in medication or treatment plan   Health Care Proxy (HCP)

## 2024-05-16 NOTE — ASU PATIENT PROFILE, ADULT - FALL HARM RISK - TYPE OF ASSESSMENT
----- Message from Oksana Vines sent at 5/16/2023  9:37 AM CDT -----  Contact: Pt Home [phone]  Caller is requesting to schedule their annual screening mammogram appointment. Order is not listed in Epic.  Please enter order and contact patient to schedule.  Would the patient like a call back, or a response through their [facility] portal?:   Call          Admission

## 2024-05-16 NOTE — ASU PATIENT PROFILE, ADULT - NSICDXPASTSURGICALHX_GEN_ALL_CORE_FT
PAST SURGICAL HISTORY:  Artificial pacemaker Medtronic Model [id number]  initially 7/28/2009,  generator change 9/30/2020 ,    H/O colonoscopy     H/O mastectomy, left 1995 axillary node dissection    H/O melanoma excision     H/O total knee replacement, left     History of appendectomy 2018    History of arthroscopy of left knee 2007    History of tonsillectomy     Pacemaker lead fracture replace atrial only 8/2021

## 2024-05-16 NOTE — ASU DISCHARGE PLAN (ADULT/PEDIATRIC) - CARE PROVIDER_API CALL
Shavonne Castillo  Orthopaedic Surgery  [address] [place] [address]  Phone: [phone]  Fax: [phone]  Follow Up Time:

## 2024-05-16 NOTE — ASU PATIENT PROFILE, ADULT - NSICDXPASTMEDICALHX_GEN_ALL_CORE_FT
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    Epistaxis 6/2022    Erythrocytosis Chronic, compensatory - mild    Fatty liver     Former smoker     Hiatal hernia     History of ETOH abuse last drink 1984    Hyperlipidemia, unspecified hyperlipidemia type     Hypertension     Left knee pain     Lymph edema LUE    Malignant neoplasm of left female breast, unspecified estrogen receptor status, unspecified site of breast 1995 , h/o chemo    Melanoma back    On home oxygen therapy 2L as needed    Osteoarthritis both knees L>R    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

## 2024-05-21 ENCOUNTER — NON-APPOINTMENT (OUTPATIENT)
Age: 79
End: 2024-05-21

## 2024-05-22 ENCOUNTER — APPOINTMENT (OUTPATIENT)
Dept: ELECTROPHYSIOLOGY | Facility: CLINIC | Age: 79
End: 2024-05-22
Payer: MEDICARE

## 2024-05-22 PROBLEM — K44.9 DIAPHRAGMATIC HERNIA WITHOUT OBSTRUCTION OR GANGRENE: Chronic | Status: ACTIVE | Noted: 2024-05-08

## 2024-05-22 PROBLEM — G56.01 CARPAL TUNNEL SYNDROME, RIGHT UPPER LIMB: Chronic | Status: ACTIVE | Noted: 2024-05-08

## 2024-05-22 PROBLEM — L72.3 SEBACEOUS CYST: Chronic | Status: ACTIVE | Noted: 2024-05-08

## 2024-05-22 PROCEDURE — 93294 REM INTERROG EVL PM/LDLS PM: CPT

## 2024-05-22 PROCEDURE — 93296 REM INTERROG EVL PM/IDS: CPT

## 2024-05-23 DIAGNOSIS — Z85.3 PERSONAL HISTORY OF MALIGNANT NEOPLASM OF BREAST: ICD-10-CM

## 2024-05-23 DIAGNOSIS — G56.01 CARPAL TUNNEL SYNDROME, RIGHT UPPER LIMB: ICD-10-CM

## 2024-05-23 DIAGNOSIS — Z85.820 PERSONAL HISTORY OF MALIGNANT MELANOMA OF SKIN: ICD-10-CM

## 2024-05-23 DIAGNOSIS — J43.9 EMPHYSEMA, UNSPECIFIED: ICD-10-CM

## 2024-05-23 DIAGNOSIS — Z79.01 LONG TERM (CURRENT) USE OF ANTICOAGULANTS: ICD-10-CM

## 2024-05-23 DIAGNOSIS — Z90.12 ACQUIRED ABSENCE OF LEFT BREAST AND NIPPLE: ICD-10-CM

## 2024-05-23 DIAGNOSIS — Z87.891 PERSONAL HISTORY OF NICOTINE DEPENDENCE: ICD-10-CM

## 2024-05-23 DIAGNOSIS — M85.80 OTHER SPECIFIED DISORDERS OF BONE DENSITY AND STRUCTURE, UNSPECIFIED SITE: ICD-10-CM

## 2024-05-23 DIAGNOSIS — I48.0 PAROXYSMAL ATRIAL FIBRILLATION: ICD-10-CM

## 2024-05-23 DIAGNOSIS — Z96.652 PRESENCE OF LEFT ARTIFICIAL KNEE JOINT: ICD-10-CM

## 2024-05-23 DIAGNOSIS — Z95.0 PRESENCE OF CARDIAC PACEMAKER: ICD-10-CM

## 2024-05-23 DIAGNOSIS — E78.5 HYPERLIPIDEMIA, UNSPECIFIED: ICD-10-CM

## 2024-05-23 DIAGNOSIS — I10 ESSENTIAL (PRIMARY) HYPERTENSION: ICD-10-CM

## 2024-05-23 DIAGNOSIS — F41.9 ANXIETY DISORDER, UNSPECIFIED: ICD-10-CM

## 2024-05-23 DIAGNOSIS — J44.9 CHRONIC OBSTRUCTIVE PULMONARY DISEASE, UNSPECIFIED: ICD-10-CM

## 2024-05-23 DIAGNOSIS — M17.11 UNILATERAL PRIMARY OSTEOARTHRITIS, RIGHT KNEE: ICD-10-CM

## 2024-06-03 ENCOUNTER — APPOINTMENT (OUTPATIENT)
Dept: PHYSICAL MEDICINE AND REHAB | Facility: CLINIC | Age: 79
End: 2024-06-03
Payer: MEDICARE

## 2024-06-03 DIAGNOSIS — I89.0 LYMPHEDEMA, NOT ELSEWHERE CLASSIFIED: ICD-10-CM

## 2024-06-03 PROCEDURE — 99213 OFFICE O/P EST LOW 20 MIN: CPT

## 2024-06-03 NOTE — ASSESSMENT
[FreeTextEntry1] : 79 year old female here for evaluation.  #ODALYS lymphedema: -Completed CDT -Stable -Educated about lymphedema  -Continue compression sleeve and gauntlet 20-30mmHg   #Right Carpal Tunnel Syndrome: -S/P EMG -s/p release    F/u in 3 months

## 2024-06-03 NOTE — HISTORY OF PRESENT ILLNESS
[FreeTextEntry1] : Ms. Bright is a 78-year-old right-handed female with history of Left breast cancer diagnosed in 1995 at age 50, stage I, s/p L mastectomy, axillary node dissection (11/30/95, Dr. Fair) = per patient, 0/13 nodes. S/p chemotherapy ([facility], Dr. Valle). S/p tamoxifen x 5 years.  She developed left upper extremity swelling and intermittent discomfort around 5 years ago. It is hard for her to describe how the discomfort feels. The discomfort mostly arises at night and aggressive massaging relieves the pain. She never had physical therapy or lymphedema therapy. She never wore a compression garment or used a pneumatic pump.  She used to go to the gym but stopped last year due to her emphysema. She hopes to return to the gym soon. She previously did pulmonary rehab which she found helpful. She was offered to do it again but would like to hold off for now.  She has right 1st-3rd finger numbness and sometimes wakes up with bilateral hand pain. She wears hand braces occasionally at night which helps. She is scheduled to see a Neurologist in November 2024. Denies ever getting an EMG/NCS or a steroid injection in the wrist. No pain radiation from neck. No bowel/bladder dysfunction.  Interval history: Patient s/p Carpal Tunnel syndrome surgery in May 2024. Patient continues to consistently don the compression garment at the LUE.   Patient notes improvement in RUE dorsal 1-3 finger sensation after CTS surgery. Denies any pain in the LUE, erythema or increased warmth.

## 2024-06-03 NOTE — PHYSICAL EXAM
[FreeTextEntry1] : Gen: Patient is A&O x 3, NAD HEENT: EOMI, hearing grossly normal Resp: regular, non - labored CV: pulses regular Skin: no erythema  Lymph: no clubbing, cyanosis, + LUE edema Inspection: no instability ROM: full throughout Palpation:no tenderness to palpation Sensation: improvement to light touch in right 1-st-3rd fingers Reflexes: 1+ and symmetric throughout Strength: 5/5 throughout Special tests: -Tinel's, -Hughes Gait: normal, non-antalgic  Tape Measurements were done:  Left upper extremity: 15 cm above olecranon:  33 cm, 33.5 cm, 37 cm 10 cm above olecranon: 31 cm 32 cm, 36 cm 10 cm below olecranon: 22.5, 22 cm, 24 cm 15 cm below olecranon: 18.8 cm 19 cm, 21 cm Wrist: 15.5cm 16 cm, 16.8 cm Dorsum of Hand: 17cm 17.5 cm, 18.8 cm Base of 2nd digit: 6 cm 6 cm, 6 cm

## 2024-06-06 ENCOUNTER — NON-APPOINTMENT (OUTPATIENT)
Age: 79
End: 2024-06-06

## 2024-06-06 ASSESSMENT — KOOS JR
KOOS JR RAW SCORE: 3
RISING FROM SITTING: MILD
HOW SEVERE IS YOUR KNEE STIFFNESS AFTER FIRST WAKING IN MORNING: MILD
GOING UP OR DOWN STAIRS: MILD

## 2024-07-08 ENCOUNTER — APPOINTMENT (OUTPATIENT)
Dept: ORTHOPEDIC SURGERY | Facility: CLINIC | Age: 79
End: 2024-07-08
Payer: MEDICARE

## 2024-07-08 ENCOUNTER — NON-APPOINTMENT (OUTPATIENT)
Age: 79
End: 2024-07-08

## 2024-07-08 VITALS
DIASTOLIC BLOOD PRESSURE: 81 MMHG | HEIGHT: 63 IN | SYSTOLIC BLOOD PRESSURE: 154 MMHG | HEART RATE: 81 BPM | BODY MASS INDEX: 26.93 KG/M2 | WEIGHT: 152 LBS

## 2024-07-08 DIAGNOSIS — Z96.652 PRESENCE OF LEFT ARTIFICIAL KNEE JOINT: ICD-10-CM

## 2024-07-08 PROCEDURE — 73650 X-RAY EXAM OF HEEL: CPT | Mod: LT

## 2024-07-08 PROCEDURE — 99212 OFFICE O/P EST SF 10 MIN: CPT

## 2024-08-13 ENCOUNTER — APPOINTMENT (OUTPATIENT)
Dept: BREAST CENTER | Facility: CLINIC | Age: 79
End: 2024-08-13
Payer: MEDICARE

## 2024-08-13 VITALS
HEART RATE: 85 BPM | SYSTOLIC BLOOD PRESSURE: 142 MMHG | DIASTOLIC BLOOD PRESSURE: 88 MMHG | HEIGHT: 63 IN | WEIGHT: 150 LBS | BODY MASS INDEX: 26.58 KG/M2

## 2024-08-13 DIAGNOSIS — I89.0 LYMPHEDEMA, NOT ELSEWHERE CLASSIFIED: ICD-10-CM

## 2024-08-13 DIAGNOSIS — Z08 ENCOUNTER FOR FOLLOW-UP EXAMINATION AFTER COMPLETED TREATMENT FOR MALIGNANT NEOPLASM: ICD-10-CM

## 2024-08-13 DIAGNOSIS — Z85.3 ENCOUNTER FOR FOLLOW-UP EXAMINATION AFTER COMPLETED TREATMENT FOR MALIGNANT NEOPLASM: ICD-10-CM

## 2024-08-13 PROCEDURE — 99214 OFFICE O/P EST MOD 30 MIN: CPT

## 2024-08-13 RX ORDER — FLUTICASONE FUROATE, UMECLIDINIUM BROMIDE AND VILANTEROL TRIFENATATE 200; 62.5; 25 UG/1; UG/1; UG/1
POWDER RESPIRATORY (INHALATION)
Refills: 0 | Status: ACTIVE | COMMUNITY

## 2024-08-13 NOTE — DATA REVIEWED
[FreeTextEntry1] : MAMMO SCREEN W CAROLYN RT 9/5/2023: No mammographic evidence of malignancy.BI-RADS 2 - Benign Finding

## 2024-08-13 NOTE — HISTORY OF PRESENT ILLNESS
[FreeTextEntry1] : Ms. Bright is a 79 year old woman here for a follow-up for surveillance for breast cancer as part of survivorship. Her breast history is significant for  1.) Left breast cancer diagnosed in 1995 at age 50, stage I - s/p L mastectomy, axillary node dissection (11/30/95, Dr. Fair) = per patient, 0/13 nodes - s/p chemotherapy ([facility], Dr. Valle) - s/p tamoxifen x 5 years  Genetic testing [facility] panel : negative   She has no breast complaints. She has LUE lymphedema which is stable.   She notes better control of COPD symptoms. She also notes no symptomatic AF since her last visit.  She is s/p right carpal tunnel release and notes improvement in function but still some numbness.     Recent lower back issue causing some pain/numbness down her left thigh is currently being worked up .  However, she notes resolution of her discomfort.    She has a trip to [facility] and the [place] planned for next month.

## 2024-08-13 NOTE — PHYSICAL EXAM
[Normocephalic] : normocephalic [Atraumatic] : atraumatic [Sclera nonicteric] : sclera nonicteric [Conjunctiva pink] : conjunctiva pink [No Supraclavicular Adenopathy] : no supraclavicular adenopathy [No Cervical Adenopathy] : no cervical adenopathy [Clear to Auscultation Bilat] : clear to auscultation bilaterally [Normal S1, S2] : normal S1 and S2 [Asymmetrical] : asymmetrical [No dominant masses] : no dominant masses in right breast  [No Nipple Retraction] : no right nipple retraction [No Nipple Discharge] : no right nipple discharge [No Axillary Lymphadenopathy] : no left axillary lymphadenopathy [Soft] : abdomen soft [No Edema] : no edema [No Swelling] : no swelling [No Rashes] : no rashes [No Ulceration] : no ulceration [de-identified] : no wheezing noted [de-identified] : transverse scar / mastectomy [de-identified] : Left upper extremity - with compression sleeve in place , hand without swelling

## 2024-08-20 ENCOUNTER — APPOINTMENT (OUTPATIENT)
Dept: ELECTROPHYSIOLOGY | Facility: CLINIC | Age: 79
End: 2024-08-20
Payer: MEDICARE

## 2024-08-20 ENCOUNTER — NON-APPOINTMENT (OUTPATIENT)
Age: 79
End: 2024-08-20

## 2024-08-21 PROCEDURE — 93296 REM INTERROG EVL PM/IDS: CPT

## 2024-08-21 PROCEDURE — 93294 REM INTERROG EVL PM/LDLS PM: CPT

## 2024-09-09 ENCOUNTER — APPOINTMENT (OUTPATIENT)
Dept: PHYSICAL MEDICINE AND REHAB | Facility: CLINIC | Age: 79
End: 2024-09-09
Payer: MEDICARE

## 2024-09-09 VITALS — HEIGHT: 63 IN | WEIGHT: 150 LBS | BODY MASS INDEX: 26.58 KG/M2

## 2024-09-09 DIAGNOSIS — I89.0 LYMPHEDEMA, NOT ELSEWHERE CLASSIFIED: ICD-10-CM

## 2024-09-09 PROCEDURE — 99213 OFFICE O/P EST LOW 20 MIN: CPT

## 2024-09-09 NOTE — PHYSICAL EXAM
[FreeTextEntry1] : Gen: Patient is A&O x 3, NAD HEENT: EOMI, hearing grossly normal Resp: regular, non - labored CV: pulses regular Skin: no erythema  Lymph: no clubbing, cyanosis, + LUE edema Inspection: no instability ROM: full throughout Palpation:no tenderness to palpation Sensation: Intact  Reflexes: 1+ and symmetric throughout Strength: 5/5 throughout Special tests: -Tinel's, -Hughes Gait: normal, non-antalgic  Tape Measurements were done:  Left upper extremity: 15 cm above olecranon:  34.5 cm 33 cm, 33.5 cm, 37 cm 10 cm above olecranon: 33 cm 31 cm 32 cm, 36 cm 10 cm below olecranon: 21 cm 22.5, 22 cm, 24 cm 15 cm below olecranon: 18 cm 18.8 cm 19 cm, 21 cm Wrist: 14.5 cm 15.5cm 16 cm, 16.8 cm Dorsum of Hand: 16 cm 17cm 17.5 cm, 18.8 cm Base of 2nd digit: 5.5 cm 6 cm 6 cm, 6 cm

## 2024-09-09 NOTE — HISTORY OF PRESENT ILLNESS
[FreeTextEntry1] : Ms. Bright is a 78-year-old right-handed female with history of Left breast cancer diagnosed in 1995 at age 50, stage I, s/p L mastectomy, axillary node dissection (11/30/95, Dr. Fair) = per patient, 0/13 nodes. S/p chemotherapy ([facility], Dr. Valle). S/p tamoxifen x 5 years.  She developed left upper extremity swelling and intermittent discomfort around 5 years ago. It is hard for her to describe how the discomfort feels. The discomfort mostly arises at night and aggressive massaging relieves the pain. She never had physical therapy or lymphedema therapy. She never wore a compression garment or used a pneumatic pump.  She used to go to the gym but stopped last year due to her emphysema. She hopes to return to the gym soon. She previously did pulmonary rehab which she found helpful. She was offered to do it again but would like to hold off for now.  She has right 1st-3rd finger numbness and sometimes wakes up with bilateral hand pain. She wears hand braces occasionally at night which helps. She is scheduled to see a Neurologist in November 2024. Denies ever getting an EMG/NCS or a steroid injection in the wrist. No pain radiation from neck. No bowel/bladder dysfunction.  Interval history: She reports that she thinks her lymphedema has been stable.  She continues to wear compression sleeve regularly.  Denies any pain erythema or increased warmth.

## 2024-09-09 NOTE — ASSESSMENT
[FreeTextEntry1] : 79 year old female here for evaluation.  #LUE lymphedema: -S/p CDT  -Stable -Educated about lymphedema  -Advised on guidelines regarding air travel  -Continue compression sleeve and gauntlet 20-30mmHg  -The patient requires ready to wear compression sleeve and gauntlet due to her lymphedema to maintain and prevent worsening of this condition.  Lymphedema is a chronic/lifetime condition and the patient will have continued need for these supplies.   F/u in 4-6 months

## 2024-11-05 ENCOUNTER — APPOINTMENT (OUTPATIENT)
Dept: MAMMOGRAPHY | Facility: CLINIC | Age: 79
End: 2024-11-05

## 2024-11-05 ENCOUNTER — APPOINTMENT (OUTPATIENT)
Dept: ULTRASOUND IMAGING | Facility: CLINIC | Age: 79
End: 2024-11-05

## 2024-11-05 ENCOUNTER — OUTPATIENT (OUTPATIENT)
Dept: OUTPATIENT SERVICES | Facility: HOSPITAL | Age: 79
LOS: 1 days | End: 2024-11-05
Payer: MEDICARE

## 2024-11-05 ENCOUNTER — RESULT REVIEW (OUTPATIENT)
Age: 79
End: 2024-11-05

## 2024-11-05 DIAGNOSIS — Z98.890 OTHER SPECIFIED POSTPROCEDURAL STATES: Chronic | ICD-10-CM

## 2024-11-05 DIAGNOSIS — Z90.49 ACQUIRED ABSENCE OF OTHER SPECIFIED PARTS OF DIGESTIVE TRACT: Chronic | ICD-10-CM

## 2024-11-05 DIAGNOSIS — Z96.652 PRESENCE OF LEFT ARTIFICIAL KNEE JOINT: Chronic | ICD-10-CM

## 2024-11-05 DIAGNOSIS — Z85.3 PERSONAL HISTORY OF MALIGNANT NEOPLASM OF BREAST: ICD-10-CM

## 2024-11-05 DIAGNOSIS — T82.110A BREAKDOWN (MECHANICAL) OF CARDIAC ELECTRODE, INITIAL ENCOUNTER: Chronic | ICD-10-CM

## 2024-11-05 DIAGNOSIS — Z95.0 PRESENCE OF CARDIAC PACEMAKER: Chronic | ICD-10-CM

## 2024-11-05 DIAGNOSIS — Z90.89 ACQUIRED ABSENCE OF OTHER ORGANS: Chronic | ICD-10-CM

## 2024-11-05 DIAGNOSIS — Z00.00 ENCOUNTER FOR GENERAL ADULT MEDICAL EXAMINATION WITHOUT ABNORMAL FINDINGS: ICD-10-CM

## 2024-11-05 DIAGNOSIS — Z90.12 ACQUIRED ABSENCE OF LEFT BREAST AND NIPPLE: Chronic | ICD-10-CM

## 2024-11-05 PROCEDURE — 76641 ULTRASOUND BREAST COMPLETE: CPT | Mod: 26,RT,GY

## 2024-11-05 PROCEDURE — 77067 SCR MAMMO BI INCL CAD: CPT | Mod: 26,RT,52

## 2024-11-05 PROCEDURE — 77063 BREAST TOMOSYNTHESIS BI: CPT | Mod: 26,52

## 2024-11-15 NOTE — PRE-ANESTHESIA EVALUATION ADULT - BSA (M2)
1.75
pt c/o left side neck pain radiating to the left chest, arm and leg for 3 months. pt was seen by neurologist and prescribed diclofenac with no relief.  history of htn, dm and arthritis.

## 2024-11-20 PROCEDURE — 77067 SCR MAMMO BI INCL CAD: CPT

## 2024-11-20 PROCEDURE — 76641 ULTRASOUND BREAST COMPLETE: CPT

## 2024-11-20 PROCEDURE — 77063 BREAST TOMOSYNTHESIS BI: CPT

## 2024-11-21 ENCOUNTER — NON-APPOINTMENT (OUTPATIENT)
Age: 79
End: 2024-11-21

## 2024-11-21 ENCOUNTER — APPOINTMENT (OUTPATIENT)
Dept: ELECTROPHYSIOLOGY | Facility: CLINIC | Age: 79
End: 2024-11-21
Payer: MEDICARE

## 2024-11-21 VITALS
HEART RATE: 96 BPM | OXYGEN SATURATION: 95 % | WEIGHT: 150 LBS | HEIGHT: 63 IN | SYSTOLIC BLOOD PRESSURE: 114 MMHG | DIASTOLIC BLOOD PRESSURE: 79 MMHG | BODY MASS INDEX: 26.58 KG/M2

## 2024-11-21 PROCEDURE — 93279 PRGRMG DEV EVAL PM/LDLS PM: CPT

## 2024-11-21 RX ORDER — UMECLIDINIUM 62.5 UG/1
62.5 AEROSOL, POWDER ORAL
Refills: 0 | Status: ACTIVE | COMMUNITY

## 2024-11-21 RX ORDER — FLUTICASONE PROPIONATE AND SALMETEROL 50; 500 UG/1; UG/1
POWDER RESPIRATORY (INHALATION)
Refills: 0 | Status: ACTIVE | COMMUNITY

## 2024-11-21 RX ORDER — ROSUVASTATIN CALCIUM 10 MG/1
10 TABLET, FILM COATED ORAL
Refills: 0 | Status: ACTIVE | COMMUNITY

## 2024-11-21 RX ORDER — BENAZEPRIL HYDROCHLORIDE 20 MG/1
20 TABLET, FILM COATED ORAL DAILY
Refills: 0 | Status: ACTIVE | COMMUNITY

## 2025-01-31 NOTE — H&P ADULT - NSHPPHYSICALEXAM_GEN_ALL_CORE
Pt is AAOx3  Pt in no acute distress  CNII-XII grossly intact   HEENT: Normocephalic, atraumatic, JORY, EOM wnl  Neck: No crepitus, no ecchymosis, no hematoma, to exam, no JVD, no tracheal deviation  Cardiovascular: S1S2 Present  Respiratory: Respiratory Effort normal; no wheezes, rales or rhonchi to exam, CTAB  ABD: bowel sounds (+), soft, (+) right lower quadrant tenderness to exam, non distended, no rebound, no guarding, no rigidity, no skin changes to exam. No pelvic instability to exam, no skin changes, negative stover's sign to exam  Musculoskeletal: All digits are warm and well perfused. Pt demonstrates grossly intact sensoromotor function. Pt has good capillary refill to digits, no calf edema or tenderness to exam.  Skin: no jaundice or icteric sclera to exam b/l, no skin changes to exam No

## 2025-02-10 ENCOUNTER — APPOINTMENT (OUTPATIENT)
Dept: PHYSICAL MEDICINE AND REHAB | Facility: CLINIC | Age: 80
End: 2025-02-10
Payer: MEDICARE

## 2025-02-10 DIAGNOSIS — I89.0 LYMPHEDEMA, NOT ELSEWHERE CLASSIFIED: ICD-10-CM

## 2025-02-10 PROCEDURE — 99213 OFFICE O/P EST LOW 20 MIN: CPT

## 2025-02-20 ENCOUNTER — NON-APPOINTMENT (OUTPATIENT)
Age: 80
End: 2025-02-20

## 2025-02-20 ENCOUNTER — APPOINTMENT (OUTPATIENT)
Dept: ELECTROPHYSIOLOGY | Facility: CLINIC | Age: 80
End: 2025-02-20
Payer: MEDICARE

## 2025-02-20 PROCEDURE — 93294 REM INTERROG EVL PM/LDLS PM: CPT

## 2025-02-20 PROCEDURE — 93296 REM INTERROG EVL PM/IDS: CPT

## 2025-05-22 ENCOUNTER — NON-APPOINTMENT (OUTPATIENT)
Age: 80
End: 2025-05-22

## 2025-05-22 ENCOUNTER — APPOINTMENT (OUTPATIENT)
Dept: ELECTROPHYSIOLOGY | Facility: CLINIC | Age: 80
End: 2025-05-22
Payer: MEDICARE

## 2025-05-22 PROCEDURE — 93296 REM INTERROG EVL PM/IDS: CPT

## 2025-05-22 PROCEDURE — 93294 REM INTERROG EVL PM/LDLS PM: CPT

## 2025-08-08 ENCOUNTER — APPOINTMENT (OUTPATIENT)
Dept: BREAST CENTER | Facility: CLINIC | Age: 80
End: 2025-08-08
Payer: MEDICARE

## 2025-08-08 VITALS
BODY MASS INDEX: 26.58 KG/M2 | WEIGHT: 150 LBS | DIASTOLIC BLOOD PRESSURE: 78 MMHG | HEART RATE: 86 BPM | HEIGHT: 63 IN | SYSTOLIC BLOOD PRESSURE: 118 MMHG

## 2025-08-08 DIAGNOSIS — R92.341 MAMMOGRAPHIC EXTREME DENSITY, RIGHT BREAST: ICD-10-CM

## 2025-08-08 DIAGNOSIS — Z08 ENCOUNTER FOR FOLLOW-UP EXAMINATION AFTER COMPLETED TREATMENT FOR MALIGNANT NEOPLASM: ICD-10-CM

## 2025-08-08 DIAGNOSIS — Z85.3 ENCOUNTER FOR FOLLOW-UP EXAMINATION AFTER COMPLETED TREATMENT FOR MALIGNANT NEOPLASM: ICD-10-CM

## 2025-08-08 PROCEDURE — 99214 OFFICE O/P EST MOD 30 MIN: CPT

## 2025-08-08 RX ORDER — UMECLIDINIUM 62.5 UG/1
AEROSOL, POWDER ORAL
Refills: 0 | Status: ACTIVE | COMMUNITY

## 2025-08-08 RX ORDER — HYDROCHLOROTHIAZIDE 25 MG/1
25 TABLET ORAL
Refills: 0 | Status: ACTIVE | COMMUNITY

## 2025-08-11 ENCOUNTER — APPOINTMENT (OUTPATIENT)
Dept: PHYSICAL MEDICINE AND REHAB | Facility: CLINIC | Age: 80
End: 2025-08-11
Payer: MEDICARE

## 2025-08-11 VITALS — HEIGHT: 63 IN | BODY MASS INDEX: 26.58 KG/M2 | WEIGHT: 150 LBS

## 2025-08-11 DIAGNOSIS — I89.0 LYMPHEDEMA, NOT ELSEWHERE CLASSIFIED: ICD-10-CM

## 2025-08-11 PROCEDURE — 99213 OFFICE O/P EST LOW 20 MIN: CPT

## 2025-08-21 ENCOUNTER — NON-APPOINTMENT (OUTPATIENT)
Age: 80
End: 2025-08-21

## 2025-08-21 ENCOUNTER — APPOINTMENT (OUTPATIENT)
Dept: ELECTROPHYSIOLOGY | Facility: CLINIC | Age: 80
End: 2025-08-21
Payer: MEDICARE

## 2025-08-21 PROCEDURE — 93294 REM INTERROG EVL PM/LDLS PM: CPT

## 2025-08-21 PROCEDURE — 93296 REM INTERROG EVL PM/IDS: CPT
